# Patient Record
Sex: FEMALE | Race: BLACK OR AFRICAN AMERICAN | NOT HISPANIC OR LATINO | Employment: OTHER | URBAN - METROPOLITAN AREA
[De-identification: names, ages, dates, MRNs, and addresses within clinical notes are randomized per-mention and may not be internally consistent; named-entity substitution may affect disease eponyms.]

---

## 2018-01-25 ENCOUNTER — OFFICE VISIT (OUTPATIENT)
Dept: INTERNAL MEDICINE | Facility: CLINIC | Age: 63
End: 2018-01-25
Payer: MEDICARE

## 2018-01-25 VITALS
HEART RATE: 92 BPM | OXYGEN SATURATION: 95 % | HEIGHT: 63 IN | DIASTOLIC BLOOD PRESSURE: 78 MMHG | TEMPERATURE: 99 F | SYSTOLIC BLOOD PRESSURE: 128 MMHG | BODY MASS INDEX: 41.29 KG/M2 | WEIGHT: 233 LBS

## 2018-01-25 DIAGNOSIS — I10 HYPERTENSION, UNSPECIFIED TYPE: ICD-10-CM

## 2018-01-25 DIAGNOSIS — Z79.4 TYPE 2 DIABETES MELLITUS WITH COMPLICATION, WITH LONG-TERM CURRENT USE OF INSULIN: ICD-10-CM

## 2018-01-25 DIAGNOSIS — F32.A DEPRESSION, UNSPECIFIED DEPRESSION TYPE: ICD-10-CM

## 2018-01-25 DIAGNOSIS — Z09 HOSPITAL DISCHARGE FOLLOW-UP: ICD-10-CM

## 2018-01-25 DIAGNOSIS — F03.90 DEMENTIA WITHOUT BEHAVIORAL DISTURBANCE, UNSPECIFIED DEMENTIA TYPE: ICD-10-CM

## 2018-01-25 DIAGNOSIS — E66.01 MORBID OBESITY: Primary | ICD-10-CM

## 2018-01-25 DIAGNOSIS — E78.5 HYPERLIPIDEMIA, UNSPECIFIED HYPERLIPIDEMIA TYPE: ICD-10-CM

## 2018-01-25 DIAGNOSIS — E03.9 HYPOTHYROIDISM, UNSPECIFIED TYPE: ICD-10-CM

## 2018-01-25 DIAGNOSIS — E11.8 TYPE 2 DIABETES MELLITUS WITH COMPLICATION, WITH LONG-TERM CURRENT USE OF INSULIN: ICD-10-CM

## 2018-01-25 DIAGNOSIS — G89.4 CHRONIC PAIN DISORDER: ICD-10-CM

## 2018-01-25 DIAGNOSIS — R32 URINARY INCONTINENCE, UNSPECIFIED TYPE: ICD-10-CM

## 2018-01-25 DIAGNOSIS — R26.81 GAIT INSTABILITY: ICD-10-CM

## 2018-01-25 PROCEDURE — 99204 OFFICE O/P NEW MOD 45 MIN: CPT | Mod: S$GLB,,, | Performed by: FAMILY MEDICINE

## 2018-01-25 PROCEDURE — 99999 PR PBB SHADOW E&M-NEW PATIENT-LVL V: CPT | Mod: PBBFAC,,, | Performed by: FAMILY MEDICINE

## 2018-01-25 RX ORDER — ZOLPIDEM TARTRATE 5 MG/1
5 TABLET ORAL NIGHTLY
Refills: 1 | COMMUNITY
Start: 2017-12-13 | End: 2020-08-22

## 2018-01-25 RX ORDER — INSULIN GLARGINE 100 [IU]/ML
INJECTION, SOLUTION SUBCUTANEOUS
Refills: 1 | COMMUNITY
Start: 2017-12-06 | End: 2019-11-07 | Stop reason: DRUGHIGH

## 2018-01-25 RX ORDER — ESCITALOPRAM OXALATE 20 MG/1
20 TABLET ORAL DAILY
Refills: 1 | COMMUNITY
Start: 2018-01-08 | End: 2019-11-07

## 2018-01-25 RX ORDER — HYDROGEN PEROXIDE 3 %
20 SOLUTION, NON-ORAL MISCELLANEOUS
COMMUNITY
End: 2018-02-26 | Stop reason: SDUPTHER

## 2018-01-25 RX ORDER — HYDROCODONE BITARTRATE AND ACETAMINOPHEN 10; 325 MG/1; MG/1
1 TABLET ORAL 2 TIMES DAILY
Refills: 0 | COMMUNITY
Start: 2017-11-16 | End: 2018-02-26

## 2018-01-25 RX ORDER — ROSUVASTATIN CALCIUM 10 MG/1
10 TABLET, COATED ORAL NIGHTLY
Refills: 1 | COMMUNITY
Start: 2018-01-18 | End: 2020-05-14 | Stop reason: SDUPTHER

## 2018-01-25 RX ORDER — LEVOTHYROXINE SODIUM 88 UG/1
88 TABLET ORAL DAILY
Refills: 0 | COMMUNITY
Start: 2018-01-02 | End: 2019-12-30

## 2018-01-25 RX ORDER — POLYETHYLENE GLYCOL 3350 17 G/17G
POWDER, FOR SOLUTION ORAL
Refills: 0 | COMMUNITY
Start: 2018-01-04 | End: 2020-10-29

## 2018-01-25 RX ORDER — INSULIN ASPART 100 [IU]/ML
INJECTION, SUSPENSION SUBCUTANEOUS
Refills: 2 | COMMUNITY
Start: 2018-01-22 | End: 2019-12-30

## 2018-01-25 RX ORDER — SITAGLIPTIN AND METFORMIN HYDROCHLORIDE 1000; 100 MG/1; MG/1
1 TABLET, FILM COATED, EXTENDED RELEASE ORAL DAILY
Refills: 0 | COMMUNITY
Start: 2018-01-02 | End: 2019-11-14 | Stop reason: CLARIF

## 2018-01-25 RX ORDER — LOSARTAN POTASSIUM AND HYDROCHLOROTHIAZIDE 12.5; 1 MG/1; MG/1
1 TABLET ORAL DAILY
Refills: 0 | COMMUNITY
Start: 2018-01-11 | End: 2020-01-27

## 2018-01-25 RX ORDER — MEMANTINE HYDROCHLORIDE 10 MG/1
10 TABLET ORAL 2 TIMES DAILY
Refills: 0 | COMMUNITY
Start: 2018-01-06 | End: 2020-10-02

## 2018-01-25 RX ORDER — ZOLPIDEM TARTRATE 10 MG/1
TABLET ORAL
Refills: 2 | COMMUNITY
Start: 2018-01-12 | End: 2020-08-22

## 2018-01-25 RX ORDER — BROMPHENIRAMINE MALEATE, DEXTROMETHORPHAN HBR, PHENYLEPHRINE HCL, DIPHENHYDRAMINE HCL, PHENYLEPHRINE HCL 0.52G
0.52 KIT ORAL DAILY
COMMUNITY
End: 2022-04-26

## 2018-01-25 RX ORDER — SOLIFENACIN SUCCINATE 5 MG/1
5 TABLET, FILM COATED ORAL DAILY
Refills: 0 | COMMUNITY
Start: 2018-01-16 | End: 2018-02-26 | Stop reason: SDUPTHER

## 2018-01-25 RX ORDER — NAPROXEN SODIUM 220 MG/1
81 TABLET, FILM COATED ORAL DAILY
COMMUNITY
End: 2020-08-19 | Stop reason: SDUPTHER

## 2018-01-25 RX ORDER — GABAPENTIN 300 MG/1
300 CAPSULE ORAL 3 TIMES DAILY
Refills: 0 | COMMUNITY
Start: 2017-11-16 | End: 2018-02-26

## 2018-01-25 RX ORDER — DILTIAZEM HYDROCHLORIDE 180 MG/1
180 CAPSULE, COATED, EXTENDED RELEASE ORAL 2 TIMES DAILY
Refills: 0 | COMMUNITY
Start: 2017-12-07 | End: 2018-02-26 | Stop reason: SDUPTHER

## 2018-01-25 RX ORDER — ERGOCALCIFEROL 1.25 MG/1
50000 CAPSULE ORAL
Refills: 0 | COMMUNITY
Start: 2017-12-28 | End: 2020-08-22

## 2018-01-25 NOTE — PROGRESS NOTES
Subjective:       Patient ID: Krysta Manuel is a 62 y.o. female.    Chief Complaint: Hospital Follow Up (pt presents today for a hospital f/u and establish care)    HPI     63 yo F    PMH   Stroke 2010 ( Weak on L side ), HTN, Hypothyroid, DM II, GERD.  Chronic back and knee pain.     Lives with Daughter and   Another daughter is with her today.    ER visit yesterday  OLOL   For HTN  Took medicine that day - uncertain why it was so high.  States normal never that high - reports around 150/80 when checks at home.    DM II  On insulin.    Insomnia:  Takes 10 mg - Ambien.  For year  Has a hard time falling asleep.      170/150 yesterday.    Last eye doctor 9 months - nadiya  Last colonoscopy 3 years ago. She is to return in 10 year  Up to date w/ mammogram  Up to date w/ colonoscopy    Non smoker  No alcohol  No drugs        Review of Systems   Constitutional: Negative for chills and fever.   HENT: Negative for congestion, sinus pressure and voice change.    Eyes: Negative for visual disturbance.   Respiratory: Negative for shortness of breath.    Cardiovascular: Negative for chest pain.   Gastrointestinal: Negative for constipation and diarrhea.   Genitourinary: Negative for difficulty urinating.   Musculoskeletal: Negative for gait problem and myalgias.   Skin: Negative for rash.   Neurological: Negative for dizziness and light-headedness.   Psychiatric/Behavioral: Negative for dysphoric mood.       Objective:       Vitals:    01/25/18 1447   BP: 128/78   Pulse: 92   Temp: 98.6 °F (37 °C)       Physical Exam   Constitutional: She is oriented to person, place, and time. She appears well-developed and well-nourished. She does not have a sickly appearance. No distress.   Morbid obese. Walks very slowly and intentionally w/ use of cane.   HENT:   Head: Normocephalic and atraumatic.   Right Ear: External ear normal.   Left Ear: External ear normal.   Eyes: Conjunctivae, EOM and lids are normal.   Neck:  Trachea normal, normal range of motion and full passive range of motion without pain.   Cardiovascular: Normal rate, regular rhythm and normal heart sounds.    Pulmonary/Chest: Effort normal and breath sounds normal. No stridor. No respiratory distress.   Abdominal: Soft. Normal appearance and bowel sounds are normal. She exhibits no distension. There is no tenderness. There is no guarding. No hernia.   Musculoskeletal: Normal range of motion.   Lymphadenopathy:     She has no cervical adenopathy.   Neurological: She is alert and oriented to person, place, and time. She is not disoriented.   Skin: Skin is warm, dry and intact. No rash noted. She is not diaphoretic.   Psychiatric: She has a normal mood and affect. Her speech is normal and behavior is normal. Thought content normal.       Assessment:       1. Morbid obesity    2. Hypertension, unspecified type    3. Type 2 diabetes mellitus with complication, with long-term current use of insulin    4. Hyperlipidemia, unspecified hyperlipidemia type    5. Hospital discharge follow-up    6. Depression, unspecified depression type    7. Dementia without behavioral disturbance, unspecified dementia type    8. Urinary incontinence, unspecified type    9. Chronic pain disorder    10. Hypothyroidism, unspecified type    11. Gait instability        Plan:   Morbid obesity    Hypertension, unspecified type    Type 2 diabetes mellitus with complication, with long-term current use of insulin    Hyperlipidemia, unspecified hyperlipidemia type    Hospital discharge follow-up    Depression, unspecified depression type    Dementia without behavioral disturbance, unspecified dementia type    Urinary incontinence, unspecified type    Chronic pain disorder    Hypothyroidism, unspecified type    Gait instability      61 yo AAF w/ numerous co-morbidities presents to discussed reent ER visit and establish care.    Plan on obtaining a lipid panel and hgb A1c / urine microalbuming for  baseline lab work.  She recently had CBC, CMP at [facility] - no labs concerning there  Troponin negative    HTN  Blood pressure much improved  Continue current meds  Request patient check daily and log  Will review together next week    DM II  Continue current meds/ insulin  Will check status of DM with A1c  Urine microalbumin  Plan on foot exam next week  Has had eye exam w/in the past year    Insomnia  On Ambien for past 6 years  Discussed risk  We will discuss more   Encouraged to at least attempt to wean down.  If don't need, do not take.    Gait  Walks very slowly  Plan on obtain PT for gait training.      Morbid obesity  Needs to weight  Diet! Calorie counting and exercise.    Depression -   On Lexapro 20mg    Hypothyroidism  Plan on checking TSH      Given patient has numerous co-morbidities plan on f/u in 1 week for lab review and further evaluation/ converstation/ health maintence.          No Follow-up on file.

## 2018-01-29 ENCOUNTER — LAB VISIT (OUTPATIENT)
Dept: LAB | Facility: HOSPITAL | Age: 63
End: 2018-01-29
Attending: FAMILY MEDICINE
Payer: MEDICARE

## 2018-01-29 DIAGNOSIS — Z79.4 TYPE 2 DIABETES MELLITUS WITH COMPLICATION, WITH LONG-TERM CURRENT USE OF INSULIN: ICD-10-CM

## 2018-01-29 DIAGNOSIS — E11.8 TYPE 2 DIABETES MELLITUS WITH COMPLICATION, WITH LONG-TERM CURRENT USE OF INSULIN: ICD-10-CM

## 2018-01-29 DIAGNOSIS — E78.5 HYPERLIPIDEMIA, UNSPECIFIED HYPERLIPIDEMIA TYPE: ICD-10-CM

## 2018-01-29 LAB
CHOLEST SERPL-MCNC: 129 MG/DL
CHOLEST/HDLC SERPL: 3.2 {RATIO}
ESTIMATED AVG GLUCOSE: 255 MG/DL
HBA1C MFR BLD HPLC: 10.5 %
HDLC SERPL-MCNC: 40 MG/DL
HDLC SERPL: 31 %
LDLC SERPL CALC-MCNC: 57.8 MG/DL
NONHDLC SERPL-MCNC: 89 MG/DL
TRIGL SERPL-MCNC: 156 MG/DL

## 2018-01-29 PROCEDURE — 83036 HEMOGLOBIN GLYCOSYLATED A1C: CPT

## 2018-01-29 PROCEDURE — 80061 LIPID PANEL: CPT

## 2018-01-29 PROCEDURE — 36415 COLL VENOUS BLD VENIPUNCTURE: CPT

## 2018-01-30 ENCOUNTER — TELEPHONE (OUTPATIENT)
Dept: INTERNAL MEDICINE | Facility: CLINIC | Age: 63
End: 2018-01-30

## 2018-01-30 DIAGNOSIS — Z79.4 TYPE 2 DIABETES MELLITUS WITH OTHER SPECIFIED COMPLICATION, WITH LONG-TERM CURRENT USE OF INSULIN: Primary | ICD-10-CM

## 2018-01-30 DIAGNOSIS — E11.69 TYPE 2 DIABETES MELLITUS WITH OTHER SPECIFIED COMPLICATION, WITH LONG-TERM CURRENT USE OF INSULIN: Primary | ICD-10-CM

## 2018-01-30 NOTE — TELEPHONE ENCOUNTER
----- Message from Hilton Chavez MD sent at 1/30/2018  9:21 AM CST -----  I attempted to call. No answer.  Please call and arrange for Diabetes Management appointment given A1c significantly elevated.   Order has been placed.

## 2018-02-01 ENCOUNTER — TELEPHONE (OUTPATIENT)
Dept: INTERNAL MEDICINE | Facility: CLINIC | Age: 63
End: 2018-02-01

## 2018-02-01 ENCOUNTER — OFFICE VISIT (OUTPATIENT)
Dept: INTERNAL MEDICINE | Facility: CLINIC | Age: 63
End: 2018-02-01
Payer: MEDICARE

## 2018-02-01 VITALS
DIASTOLIC BLOOD PRESSURE: 82 MMHG | HEART RATE: 90 BPM | HEIGHT: 63 IN | TEMPERATURE: 98 F | WEIGHT: 233 LBS | RESPIRATION RATE: 17 BRPM | BODY MASS INDEX: 41.29 KG/M2 | OXYGEN SATURATION: 99 % | SYSTOLIC BLOOD PRESSURE: 140 MMHG

## 2018-02-01 DIAGNOSIS — E11.69 TYPE 2 DIABETES MELLITUS WITH OTHER SPECIFIED COMPLICATION, WITH LONG-TERM CURRENT USE OF INSULIN: Primary | ICD-10-CM

## 2018-02-01 DIAGNOSIS — Z12.39 SCREENING FOR BREAST CANCER: ICD-10-CM

## 2018-02-01 DIAGNOSIS — I10 HYPERTENSION, UNSPECIFIED TYPE: ICD-10-CM

## 2018-02-01 DIAGNOSIS — Z23 IMMUNIZATION DUE: ICD-10-CM

## 2018-02-01 DIAGNOSIS — Z79.4 TYPE 2 DIABETES MELLITUS WITH OTHER SPECIFIED COMPLICATION, WITH LONG-TERM CURRENT USE OF INSULIN: Primary | ICD-10-CM

## 2018-02-01 DIAGNOSIS — G89.4 CHRONIC PAIN DISORDER: ICD-10-CM

## 2018-02-01 PROCEDURE — 3008F BODY MASS INDEX DOCD: CPT | Mod: S$GLB,,, | Performed by: FAMILY MEDICINE

## 2018-02-01 PROCEDURE — 99214 OFFICE O/P EST MOD 30 MIN: CPT | Mod: S$GLB,,, | Performed by: FAMILY MEDICINE

## 2018-02-01 PROCEDURE — G0009 ADMIN PNEUMOCOCCAL VACCINE: HCPCS | Mod: S$GLB,,, | Performed by: FAMILY MEDICINE

## 2018-02-01 PROCEDURE — 90732 PPSV23 VACC 2 YRS+ SUBQ/IM: CPT | Mod: S$GLB,,, | Performed by: FAMILY MEDICINE

## 2018-02-01 PROCEDURE — 99999 PR PBB SHADOW E&M-EST. PATIENT-LVL V: CPT | Mod: PBBFAC,,, | Performed by: FAMILY MEDICINE

## 2018-02-01 PROCEDURE — G0008 ADMIN INFLUENZA VIRUS VAC: HCPCS | Mod: S$GLB,,, | Performed by: FAMILY MEDICINE

## 2018-02-01 PROCEDURE — 90688 IIV4 VACCINE SPLT 0.5 ML IM: CPT | Mod: S$GLB,,, | Performed by: FAMILY MEDICINE

## 2018-02-01 RX ORDER — MELOXICAM 7.5 MG/1
7.5 TABLET ORAL DAILY
Qty: 30 TABLET | Refills: 0 | Status: SHIPPED | OUTPATIENT
Start: 2018-02-01 | End: 2022-04-26

## 2018-02-01 RX ORDER — DICLOFENAC SODIUM 10 MG/G
2 GEL TOPICAL 4 TIMES DAILY
Qty: 1 TUBE | Refills: 3 | Status: SHIPPED | OUTPATIENT
Start: 2018-02-01 | End: 2022-01-31

## 2018-02-01 NOTE — TELEPHONE ENCOUNTER
----- Message from Tg Sheppard sent at 1/31/2018 11:57 AM CST -----  Please call pt daughter Kamille back at [phone] about her starting P T services on Monday coming and giving her pain meds.

## 2018-02-01 NOTE — PROGRESS NOTES
Subjective:       Patient ID: Krysta Manuel is a 62 y.o. female.    Chief Complaint: Morbid obesity (Patient is coming in to follow up on labs)    HPI     61 yo    F/U on labs    A1c 10.7  Januvia  Taking insulin - 3x/ day  Lantus  Novolog    Patient is unsure of what her sugars.    Eye exam - due - will arrange w/ [facility] - was with Abhay son.    Last Mammogram - 2 years ago.        Vaccinations:  Influenza needs - will do today.  Pneumococcal needs - will do today    Patient has been off her pain medication for past 2 months.  Was taking Norco  Pain source is arthritis in knees and lower back pain.  Pain mngt appt set up on 13th.  Has never taken Mobic.          Review of Systems   Constitutional: Negative for chills and fever.   HENT: Negative for voice change.    Eyes: Negative for visual disturbance.   Respiratory: Negative for cough and shortness of breath.    Cardiovascular: Negative for chest pain.   Gastrointestinal: Negative for constipation and diarrhea.   Genitourinary: Negative for difficulty urinating.   Musculoskeletal: Positive for arthralgias and gait problem. Negative for myalgias.   Skin: Negative for rash.   Neurological: Negative for dizziness and light-headedness.   Psychiatric/Behavioral: Negative for dysphoric mood.       Objective:       Vitals:    02/01/18 1424   BP: (!) 140/82   Pulse: 90   Resp: 17   Temp: 97.5 °F (36.4 °C)       Protective Sensation (w/ 10 gram monofilament):  Right: Intact  Left: Intact    Visual Inspection:  None -  Bilateral   Small corn on R heel - no ulceration appreciated. No tenderness.    Pedal Pulses:   Right: Present  Left: Present    Posterior tibialis:   Right:Diminshed  Left: Diminshed        Physical Exam   Constitutional: She is oriented to person, place, and time. She appears well-developed and well-nourished. She does not have a sickly appearance. No distress.   Morbid obese. In wheelchair.   HENT:   Head: Normocephalic and atraumatic.   Right Ear:  External ear normal.   Left Ear: External ear normal.   Eyes: Conjunctivae, EOM and lids are normal.   Neck: Trachea normal, normal range of motion and full passive range of motion without pain.   Cardiovascular: Normal rate, regular rhythm and normal heart sounds.    Pulmonary/Chest: Effort normal and breath sounds normal. No stridor. No respiratory distress.   Abdominal: Soft. Normal appearance and bowel sounds are normal. She exhibits no distension. There is no tenderness. There is no guarding. No hernia.   Musculoskeletal: Normal range of motion.   Lymphadenopathy:     She has no cervical adenopathy.   Neurological: She is alert and oriented to person, place, and time. She is not disoriented.   Skin: Skin is warm, dry and intact. No rash noted. She is not diaphoretic.   Psychiatric: She has a normal mood and affect. Her speech is normal and behavior is normal. Thought content normal.       Assessment:       1. Type 2 diabetes mellitus with other specified complication, with long-term current use of insulin    2. Hypertension, unspecified type    3. Chronic pain disorder    4. Immunization due    5. Screening for breast cancer        Plan:   Type 2 diabetes mellitus with other specified complication, with long-term current use of insulin    Given A1c very elevated plan on evaluation by DM MNGT  Foot exam done today  Opth exam to be obtained    Hypertension, unspecified type    Slightly elevated today  Took medication  Will recheck at her F/U visit or DM visit  If elevated again plan to adjust medicine regimen    Chronic pain disorder  Knee and back pain  Pain mngt appt upcoming  Rather than refill norco 10 - 325 - which she has been out of plan on trying Mobic - if that fails consider tramadol or await Pain MNGT recommendations. Patient and daughter agreeable.    Immunization due    Influenza and pneumococcal    Mammography  Order mammogram    Follow-up in about 3 months (around 5/1/2018).

## 2018-02-01 NOTE — TELEPHONE ENCOUNTER
Spoke with daughter. Patient has established care with you and is no longer under the care of Dr. Alas. She starts physical therapy on the 13th. She will also be seeing PM doctor on this day. Daughter requests that patient's Norco 10mg be refilled just enough to last her until she sees pain management on the 13th.

## 2018-02-01 NOTE — TELEPHONE ENCOUNTER
Spoke with daughter. She was wanting to know if her mother would be written enough pain medicine to last until she saw the PM doctor on the 13th. I informed her that the doctor does not write pain medication sight unseen but that he would be happy to discuss with her today when she comes in for her appointment.

## 2018-02-09 ENCOUNTER — PATIENT OUTREACH (OUTPATIENT)
Dept: ADMINISTRATIVE | Facility: HOSPITAL | Age: 63
End: 2018-02-09

## 2018-02-13 ENCOUNTER — CLINICAL SUPPORT (OUTPATIENT)
Dept: DIABETES | Facility: CLINIC | Age: 63
End: 2018-02-13
Payer: MEDICARE

## 2018-02-13 ENCOUNTER — OFFICE VISIT (OUTPATIENT)
Dept: PAIN MEDICINE | Facility: CLINIC | Age: 63
End: 2018-02-13
Payer: MEDICARE

## 2018-02-13 VITALS
WEIGHT: 256 LBS | HEIGHT: 63 IN | RESPIRATION RATE: 18 BRPM | HEART RATE: 84 BPM | DIASTOLIC BLOOD PRESSURE: 88 MMHG | BODY MASS INDEX: 45.36 KG/M2 | SYSTOLIC BLOOD PRESSURE: 146 MMHG

## 2018-02-13 VITALS — WEIGHT: 256.5 LBS | BODY MASS INDEX: 45.45 KG/M2 | HEIGHT: 63 IN

## 2018-02-13 DIAGNOSIS — G89.29 CHRONIC PAIN OF BOTH KNEES: Primary | ICD-10-CM

## 2018-02-13 DIAGNOSIS — E11.69 TYPE 2 DIABETES MELLITUS WITH OTHER SPECIFIED COMPLICATION, WITH LONG-TERM CURRENT USE OF INSULIN: Primary | ICD-10-CM

## 2018-02-13 DIAGNOSIS — Z79.4 TYPE 2 DIABETES MELLITUS WITH OTHER SPECIFIED COMPLICATION, WITH LONG-TERM CURRENT USE OF INSULIN: Primary | ICD-10-CM

## 2018-02-13 DIAGNOSIS — M47.816 LUMBAR SPONDYLOSIS: ICD-10-CM

## 2018-02-13 DIAGNOSIS — M79.18 MYOFASCIAL MUSCLE PAIN: ICD-10-CM

## 2018-02-13 DIAGNOSIS — M25.562 CHRONIC PAIN OF BOTH KNEES: Primary | ICD-10-CM

## 2018-02-13 DIAGNOSIS — M25.561 CHRONIC PAIN OF BOTH KNEES: Primary | ICD-10-CM

## 2018-02-13 PROCEDURE — 99999 PR PBB SHADOW E&M-EST. PATIENT-LVL II: CPT | Mod: PBBFAC,,, | Performed by: DIETITIAN, REGISTERED

## 2018-02-13 PROCEDURE — 3008F BODY MASS INDEX DOCD: CPT | Mod: S$GLB,,, | Performed by: ANESTHESIOLOGY

## 2018-02-13 PROCEDURE — 99204 OFFICE O/P NEW MOD 45 MIN: CPT | Mod: S$GLB,,, | Performed by: ANESTHESIOLOGY

## 2018-02-13 PROCEDURE — G0108 DIAB MANAGE TRN  PER INDIV: HCPCS | Mod: S$GLB,,, | Performed by: DIETITIAN, REGISTERED

## 2018-02-13 PROCEDURE — 99999 PR PBB SHADOW E&M-EST. PATIENT-LVL III: CPT | Mod: PBBFAC,,, | Performed by: ANESTHESIOLOGY

## 2018-02-13 RX ORDER — TIZANIDINE 4 MG/1
4 TABLET ORAL 2 TIMES DAILY PRN
Qty: 60 TABLET | Refills: 0 | Status: SHIPPED | OUTPATIENT
Start: 2018-02-13 | End: 2018-03-20 | Stop reason: SDUPTHER

## 2018-02-13 NOTE — LETTER
February 13, 2018      Hilton Chavez MD  [address] [facility] [address]           [facility]  [address] [place] [address]  Phone: [phone]  Fax: [phone]          Patient: Krysta Manuel   MR Number: [id number]   YOB: 1955   Date of Visit: 2/13/2018       Dear Dr. Hilton Chavez:    Thank you for referring Krysta Manuel to me for evaluation. Attached you will find relevant portions of my assessment and plan of care.    If you have questions, please do not hesitate to call me. I look forward to following Krysta Manuel along with you.    Sincerely,    Juanjo Pagan MD    Enclosure  CC:  No Recipients    If you would like to receive this communication electronically, please contact [email or web address] or [phone] to request more information on [facility] [employer] access.    For providers and/or their staff who would like to refer a patient to [facility], please contact us through our one-stop-shop provider referral line, [facility] , at [phone].    If you feel you have received this communication in error or would no longer like to receive these types of communications, please e-mail [email or web address]

## 2018-02-13 NOTE — PROGRESS NOTES
Diabetes Education  Author: Kalyan Fischer RD  Date: 2/13/2018    Diabetes Education Visit  Diabetes Education Record Assessment/Progress: Initial    Diabetes Type  Diabetes Type : Type II    Diabetes History  Diabetes Diagnosis: 5-10 years     Referring Provider: Hilton Chavez MD  62 y.o. female in clinic today for diabetes education.   In 2010, pt had a stroke - left arm paralyzed. Daughter and  live with her and daughter cares for them both.   DM MEDICATIONS:    Janumet, 1000 mg daily  Lantus, avg of 80 units in morning  Novolog 70/30, 40-50 units in morning    Dose of insulin depends on blood glucose. No sliding scale given. Daughter just doses based on what she thinks she needs. She sometimes takes Lantus and sometimes takes Novolog 70/30. Some days she gets both in one day, but not at the same time.     Recent A1C:  Hemoglobin A1C   Date Value Ref Range Status   01/29/2018 10.5 (H) 4.0 - 5.6 % Final     Comment:     According to ADA guidelines, hemoglobin A1c <7.0% represents  optimal control in non-pregnant diabetic patients. Different  metrics may apply to specific patient populations.   Standards of Medical Care in Diabetes-2016.  For the purpose of screening for the presence of diabetes:  <5.7%     Consistent with the absence of diabetes  5.7-6.4%  Consistent with increasing risk for diabetes   (prediabetes)  >or=6.5%  Consistent with diabetes  Currently, no consensus exists for use of hemoglobin A1c  for diagnosis of diabetes for children.  This Hemoglobin A1c assay has significant interference with fetal   hemoglobin   (HbF). The results are invalid for patients with abnormal amounts of   HbF,   including those with known Hereditary Persistence   of Fetal Hemoglobin. Heterozygous hemoglobin variants (HbAS, HbAC,   HbAD, HbAE, HbA2) do not significantly interfere with this assay;   however, presence of multiple variants in a sample may impact the %   interference.         Nutrition  Meal  Planning: water, skipping meals  What type of sweetener do you use?: sugar  What type of beverages do you drink?: other (see comments), water, diet soda/tea (SF [employer], lemonade)  Meal Plan 24 Hour Recall - Breakfast: grits w/cheese, 1 egg, ham, SF lemonade   Meal Plan 24 Hour Recall - Lunch: none  Meal Plan 24 Hour Recall - Dinner: spaghetti w/meatsauce, corn, SF [employer]  Meal Plan 24 Hour Recall - Snack: none OR oranges OR SF cookies    Monitoring   Self Monitoring : per recall from daughter:  fst - 180-190, ac - 250's; BG has gone as high as 500  Blood Glucose Logs: No    Exercise   Exercise Type: none  Intensity: Low  Frequency:  (PT twice weekly)  Duration: 1 hour    Current Diabetes Treatment   Current Treatment: Oral Medication, Diet, Insulin    Social History  Preferred Learning Method: Face to Face  Primary Support: Self, Daughter  Occupation: disabled  Smoking Status: Never a Smoker  Alcohol Use: Never    Readiness to Learn   Readiness to Learn : Acceptance    Cultural Influences  Cultural Influences: None    Diabetes Education Assessment/Progress  Diabetes Disease Process (diabetes disease process and treatment options): Discussion, Individual Session, Demonstrates Understanding/Competency(verbalizes/demonstrates), Written Materials Provided  Nutrition (Incorporating nutritional management into one's lifestyle): Discussion, Individual Session, Demonstrates Understanding/Competency (verbalizes/demonstrates), Written Materials Provided  Physical Activity (incorporating physical activity into one's lifestyle): Discussion, Individual Session, Demonstrates Understanding/Competency (verbalizes/demonstrates)  Medications (states correct name, dose, onset, peak, duration, side effects & timing of meds): Discussion, Individual Session, Demonstrates Understanding/Competency(verbalizes/demonstrates), Written Materials Provided  Monitoring (monitoring blood glucose/other parameters & using results): Discussion,  Individual Session, Demonstrates Understanding/Competency (verbalizes/demonstrates), Written Materials Provided  Acute Complications (preventing, detecting, and treating acute complications): Discussion, Individual Session, Demonstrates Understanding/Competency (verbalizes/demonstrates), Written Materials Provided  Chronic Complications (preventing, detecting, and treating chronic complications): Discussion, Individual Session, Demonstrates Understanding/Competency (verbalizes/demonstrates)  Clinical (diabetes and other pertinent medical history): Discussion, Individual Session, Demonstrates Understanding/Competency (verbalizes/demonstrates)  Cognitive (knowledge of self-management skills, functional health literacy): Discussion, Individual Session, Demonstrates Understanding/Competency (verbalizes/demonstrates)  Psychosocial (emotional response to diabetes): Discussion, Individual Session, Demonstrates Understanding/Competency (verbalizes/demonstrates)  Diabetes Distress and Support Systems: Discussion, Individual Session, Demonstrates Understanding/Competency (verbalizes/demonstrates)  Behavioral (readiness for change, lifestyle practices, self-care behaviors): Discussion, Individual Session, Demonstrates Understanding/Competency (verbalizes/demonstrates)    Goals  Patient has selected/evaluated goals during today's session: Yes, selected  Healthy Eating: Set (30-45 g carb/meal)  Start Date: 02/13/18  Target Date: 05/13/18  Monitoring: Set (ck BG fasting and before meals or 2 hours after a meal)  Start Date: 02/13/18  Target Date: 05/13/18    Diabetes Care Plan/Intervention  Education Plan/Intervention: Individual Follow-Up DSMT, Endocrine Provider Visit Set Up    Give Lantus only, not Novolog 70/30.    Need better records of fasting and pp BG done in manner reflective of recommendations. Send message through [facility] in 2-3 days with BG log.     Diabetes Meal Plan  Restrictions: Restricted  Carbohydrate  Calories: 1800, 1600  Carbohydrate Per Meal: 30-45g  Carbohydrate Per Snack : 15-20g    Education Units of Time   Time Spent: 60 min      Health Maintenance Topics with due status: Not Due       Topic Last Completion Date    Colonoscopy 08/12/2015    Lipid Panel 01/29/2018    Hemoglobin A1c 01/29/2018    Pneumococcal PPSV23 (Medium Risk) 02/01/2018    Foot Exam 02/01/2018    Low Dose Statin 02/13/2018     Health Maintenance Due   Topic Date Due    Hepatitis C Screening  1955    Eye Exam  05/22/1964    TETANUS VACCINE  05/22/1972    Mammogram  05/22/1994    Zoster Vaccine  05/22/2014

## 2018-02-13 NOTE — LETTER
February 13, 2018        Hilton Chavez MD  [address] [facility] [address]             [facility]  [id number] [place] [address]  Phone: [phone]  Fax: [phone]   Patient: Krysta Manuel   MR Number: [id number]   YOB: 1955   Date of Visit: 2/13/2018       Dear Dr. Chavez:    Thank you for referring Krysta Manuel to me for evaluation. Below are the relevant portions of my assessment and plan of care.       If you have questions, please do not hesitate to call me. I look forward to following Krysta along with you.    Sincerely,      Kalyan Fischer RD           CC  No Recipients

## 2018-02-13 NOTE — PROGRESS NOTES
Chief Pain Complaint:  Low-back Pain  Bilateral Knee Pain      History of Present Illness:   Krysta Manuel is a 62 y.o. female  who is presenting with a chief complaint of Low-back Pain  . The patient began experiencing this problem insidiously, and the pain has been gradually worsening over the past 5 year(s). The pain is described as throbbing, cramping, aching and heavy and is located in the bilateral lumbar spine. Pain is intermittent and lasts hours. The  pain is nonradiating. The patient rates her pain a 7 out of ten and interferes with activities of daily living a 7 out of ten. Pain is exacerbated by flexion of the lumbar spine, and is improved by rest. Patient reports no prior trauma, no prior spinal surgery .    Bilateral Knee Pain. The patient began experiencing this problem insidiously, and the pain has been gradually worsening over the past 4 year(s). The pain is described as throbbing, cramping and aching and is located in the bilateral knees. Pain is intermittent and lasts hours. The  pain is nonradiating. The patient rates her pain a 8 out of ten and interferes with activities of daily living a 8 out of ten. Pain is exacerbated by ambulation, and is improved by rest, Norco.     - pertinent negatives: No fever, No chills, No weight loss, No bladder dysfunction, No bowel dysfunction, No saddle anesthesia  - pertinent positives: left leg weakness  Left Arm weakness since stroke in 2010  - medications, other therapies tried (physical therapy, injections):     >> NSAIDs, Tylenol, Norco, gabapentin and zanaflex    >> Has previously undergone Physical Therapy    >> Has NOT previously undergone spinal injection/s      Imaging / Labs / Studies (reviewed on 2/13/2018):      Review of Systems:  CONSTITUTIONAL: patient denies any fever, chills, or weight loss  SKIN: patient denies any rash or itching  RESPIRATORY: patient denies having any shortness of breath  GASTROINTESTINAL: patient denies having any  "diarrhea, constipation, or bowel incontinence  GENITOURINARY: patient denies having any abnormal bladder function    MUSCULOSKELETAL:  - patient complains of the above noted pain/s (see chief pain complaint)    NEUROLOGICAL:   - pain as above  - strength in Lower extremities is decreased, on the LEFT  - sensation in Lower extremities is intact, BILATERALLY  - patient denies any loss of bowel or bladder control      PSYCHIATRIC: patient denies any change in mood    Other:  All other systems reviewed and are negative      Physical Exam:  BP (!) 146/88 (BP Location: Right arm, Patient Position: Sitting, BP Method: Medium (Automatic))   Pulse 84   Resp 18   Ht 5' 3" (1.6 m)   Wt 116.1 kg (256 lb)   BMI 45.35 kg/m²  (reviewed on 2/13/2018)  General: Alert and oriented, in no apparent distress.  Gait: normal gait.  Skin: No rashes, No discoloration, No obvious lesions  HEENT: Normocephalic, atraumatic. Pupils equal and round.  Cardiovascular: Regular rate and rhythm , no significant peripheral edema present  Respiratory: Without audible wheezing, without use of accessory muscles of respiration.    Musculoskeletal:    Lumbar Spine    - Pain on flexion of lumbar spine Absent  - Straight Leg Raise:  Absent    - Pain on extension of lumbar spine Absent  - TTP over the lumbar facet joints Absent  - Lumbar facet loading Absent    -Pain on palpation over the SI joint  Absent  - VENITA: Absent    -TTP over the para lumbar muscles    -TTP over bilateral knee joints. Joints cool. No effusion.     Assessment:    Krysta Manuel is a 62 y.o. year old female who is presenting with     Encounter Diagnoses   Name Primary?    Lumbar spondylosis     Chronic pain of both knees Yes    Myofascial muscle pain        Plan:    1. Interventional: Consider Bilateral Genicular Nerve Block. Patient is not interested at this time.     2. Pharmacologic: Start Tizanidine 4 mg PO BID PRN. Compound Cream prescribed.  Checked. Opioid not " indicated at this time.     3. Rehabilitative: Encouraged Ambulation. Patient currently in Pt.     4. Diagnostic: Lumbar Xray, Bilateral Knee Xray.     5. Follow up: Follow-up in about 4 weeks (around 3/13/2018).      30 minutes were spent in this encounter with more than 50% of the time used for counseling and review of the plan.  Imaging / studies reviewed, detailed above.  I discussed in detail the risks, benefits, and alternatives to any and all potential treatment options.  All questions and concerns were fully addressed today in clinic. Medical decision making moderate.    Thank you for the opportunity to assist in the care of this patient.    Best wishes,    Signed:    Juanjo Pagan MD          Disclaimer:  This note may have been prepared using voice recognition software, it may have not been extensively proofed, as such there could be errors within the text such as sound alike errors.

## 2018-02-14 ENCOUNTER — TELEPHONE (OUTPATIENT)
Dept: DIABETES | Facility: CLINIC | Age: 63
End: 2018-02-14

## 2018-02-14 NOTE — TELEPHONE ENCOUNTER
Spoke to daughter today to check on her mother's BG. Last night, mother's BG was 180 and she did not want to take insulin. This morning, her BG was in the 200's.   Reviewed order for Lantus with daughter - informed that it was ordered to give bid. She expressed that she is aware of this, but she will just keep doing what she's been doing for dosing insulin. Encouraged to keep appt with VIRGINIA Garcia next week.

## 2018-02-20 ENCOUNTER — TELEPHONE (OUTPATIENT)
Dept: DIABETES | Facility: CLINIC | Age: 63
End: 2018-02-20

## 2018-02-23 ENCOUNTER — TELEPHONE (OUTPATIENT)
Dept: DIABETES | Facility: CLINIC | Age: 63
End: 2018-02-23

## 2018-02-26 ENCOUNTER — OFFICE VISIT (OUTPATIENT)
Dept: INTERNAL MEDICINE | Facility: CLINIC | Age: 63
End: 2018-02-26
Payer: MEDICARE

## 2018-02-26 VITALS
BODY MASS INDEX: 40.79 KG/M2 | RESPIRATION RATE: 18 BRPM | SYSTOLIC BLOOD PRESSURE: 152 MMHG | WEIGHT: 230.19 LBS | HEIGHT: 63 IN | DIASTOLIC BLOOD PRESSURE: 84 MMHG | HEART RATE: 86 BPM

## 2018-02-26 DIAGNOSIS — Z86.73 HISTORY OF STROKE: ICD-10-CM

## 2018-02-26 DIAGNOSIS — F41.9 ANXIETY: Primary | ICD-10-CM

## 2018-02-26 DIAGNOSIS — R10.9 ABDOMINAL PAIN, UNSPECIFIED ABDOMINAL LOCATION: ICD-10-CM

## 2018-02-26 PROCEDURE — 99999 PR PBB SHADOW E&M-EST. PATIENT-LVL III: CPT | Mod: PBBFAC,,, | Performed by: FAMILY MEDICINE

## 2018-02-26 PROCEDURE — 99213 OFFICE O/P EST LOW 20 MIN: CPT | Mod: S$GLB,,, | Performed by: FAMILY MEDICINE

## 2018-02-26 PROCEDURE — 3008F BODY MASS INDEX DOCD: CPT | Mod: S$GLB,,, | Performed by: FAMILY MEDICINE

## 2018-02-26 RX ORDER — HYDROGEN PEROXIDE 3 %
40 SOLUTION, NON-ORAL MISCELLANEOUS DAILY
Qty: 60 CAPSULE | Refills: 1 | Status: SHIPPED | OUTPATIENT
Start: 2018-02-26 | End: 2019-11-07

## 2018-02-26 RX ORDER — HYDROXYZINE HYDROCHLORIDE 50 MG/1
50 TABLET, FILM COATED ORAL 3 TIMES DAILY PRN
Qty: 90 TABLET | Refills: 1 | Status: SHIPPED | OUTPATIENT
Start: 2018-02-26 | End: 2018-04-30 | Stop reason: SDUPTHER

## 2018-02-26 RX ORDER — SOLIFENACIN SUCCINATE 5 MG/1
5 TABLET, FILM COATED ORAL DAILY
Qty: 30 TABLET | Refills: 3 | Status: SHIPPED | OUTPATIENT
Start: 2018-02-26 | End: 2022-04-26

## 2018-02-26 RX ORDER — DILTIAZEM HYDROCHLORIDE 180 MG/1
180 CAPSULE, COATED, EXTENDED RELEASE ORAL DAILY
Qty: 30 CAPSULE | Refills: 3 | Status: SHIPPED | OUTPATIENT
Start: 2018-02-26 | End: 2020-04-30

## 2018-02-26 NOTE — PROGRESS NOTES
"Subjective:       Patient ID: Krysta Manuel is a 62 y.o. female.    Chief Complaint: Heartburn    HPI     61 yo F    Presents with Abdominal Burning  Notes it with "nervousness" - only occurs when she is nervous. Not so much exertion - just emotional stress/anxiety.    Normal BM  No dark stools. No red stools    No chest pain  No SOB  Slight nausea.  No fevers  No chills      Takes Nexium.    Abdomen not tender to touch.  Present all day.  Improves at night time.  States resolves when she goes to relax.    Feels it is present when she is stressed.  She feels it come on when she is nervous.    States it takes her a long time to fall asleep. Blames it on being nervous.   Continues to say it is her nerves and how anxious she is.    Denies exertional symptoms.  Review of Systems   Constitutional: Negative for chills and fever.   HENT: Negative for congestion, sinus pressure and voice change.    Eyes: Negative for visual disturbance.   Respiratory: Negative for shortness of breath.    Cardiovascular: Negative for chest pain.   Gastrointestinal: Negative for constipation and diarrhea.   Genitourinary: Negative for difficulty urinating.   Musculoskeletal: Negative for gait problem and myalgias.   Skin: Negative for rash.   Neurological: Negative for dizziness and light-headedness.   Psychiatric/Behavioral: Negative for dysphoric mood.       Objective:       Vitals:    02/26/18 1615   BP: (!) 152/84   Pulse: 86   Resp: 18       Physical Exam   Constitutional: She is oriented to person, place, and time. She appears well-developed and well-nourished. She does not have a sickly appearance. No distress.   HENT:   Head: Normocephalic and atraumatic.   Right Ear: External ear normal.   Left Ear: External ear normal.   Eyes: Conjunctivae, EOM and lids are normal.   Neck: Trachea normal, normal range of motion and full passive range of motion without pain.   Cardiovascular: Normal rate, regular rhythm and normal heart sounds.  "   Pulmonary/Chest: Effort normal and breath sounds normal. No stridor. No respiratory distress.   Abdominal: Soft. Normal appearance and bowel sounds are normal. She exhibits no distension. There is no tenderness. There is no guarding. No hernia.   Musculoskeletal: Normal range of motion.   Lymphadenopathy:     She has no cervical adenopathy.   Neurological: She is alert and oriented to person, place, and time. She is not disoriented.   Skin: Skin is warm, dry and intact. No rash noted. She is not diaphoretic.   Psychiatric: She has a normal mood and affect. Her speech is normal and behavior is normal. Thought content normal.       Assessment:       1. Anxiety    2. Abdominal pain, unspecified abdominal location        Plan:   Anxiety  Abdominal pain, unspecified abdominal location    Patient strongly feels this abdominal sensation is directly related to her mood / anxiety level. She denies any change with food, not tenderness to touch, normal BMs, no nausea.    Trial of atarax for anxiety relief. Increase PPI dose.  Suggested 40mg.    F/u in 1 week if fails to have improvement.         Other orders  -     hydrOXYzine (ATARAX) 50 MG tablet; Take 1 tablet (50 mg total) by mouth 3 (three) times daily as needed for Itching.  Dispense: 90 tablet; Refill: 1  -     esomeprazole (NEXIUM) 20 MG capsule; Take 2 capsules (40 mg total) by mouth once daily.  Dispense: 60 capsule; Refill: 1  -     diltiaZEM (CARDIZEM CD) 180 MG 24 hr capsule; Take 1 capsule (180 mg total) by mouth once daily.  Dispense: 30 capsule; Refill: 3  -     VESICARE 5 mg tablet; Take 1 tablet (5 mg total) by mouth once daily.  Dispense: 30 tablet; Refill: 3    History of Stroke  Patient utilizes and needs bedside commode due to weakness.    No Follow-up on file.

## 2018-03-09 ENCOUNTER — PATIENT OUTREACH (OUTPATIENT)
Dept: ADMINISTRATIVE | Facility: HOSPITAL | Age: 63
End: 2018-03-09

## 2018-03-09 NOTE — PROGRESS NOTES
MD Emma Dewitt MA             Patient no showed appt with Optho   Please call her and reschedule        Patient was not available, spoke with daughter, listed as emergency contact; who will call back to schedule opthalmology appointment. Patient's daughter in agreement and vocalize understanding.

## 2018-03-21 RX ORDER — TIZANIDINE 4 MG/1
TABLET ORAL
Qty: 60 TABLET | Refills: 0 | Status: SHIPPED | OUTPATIENT
Start: 2018-03-21 | End: 2022-04-26

## 2018-04-02 NOTE — TELEPHONE ENCOUNTER
----- Message from Estefany Wells sent at 4/2/2018  9:06 AM CDT -----  Contact: DaughterRupali Simental- [phone] or [phone]   Pt would like refills called in on Rx medication Vitamin D, Gabapentin and Nexium for twice daily. Please call back at [phone].  Thx -         Pt Uses:  [facility] - [address] [address]  Phone: [phone] Fax: [phone]

## 2018-04-02 NOTE — TELEPHONE ENCOUNTER
----- Message from Melissagabriele Nelson sent at 4/2/2018  3:42 PM CDT -----  Contact: pt daughter   1. What is the name of the medication you are requesting? Ambien   2. What is the dose? 10 mg   3. How do you take the medication? Orally, topically, etc? Orally   4. How often do you take this medication? One at bedtime   5. Do you need a 30 day or 90 day supply? 30  6. How many refills are you requesting? 1  7. What is your preferred pharmacy and location of the pharmacy?   [facility] - [address] [address]  Phone: [phone] Fax: [phone]  8. Who can we contact with further questions?the patient

## 2018-04-03 ENCOUNTER — HOSPITAL ENCOUNTER (OUTPATIENT)
Dept: RADIOLOGY | Facility: HOSPITAL | Age: 63
Discharge: HOME | End: 2018-04-03
Attending: FAMILY MEDICINE
Payer: MEDICARE

## 2018-04-03 VITALS — BODY MASS INDEX: 40.75 KG/M2 | HEIGHT: 63 IN | WEIGHT: 230 LBS

## 2018-04-03 DIAGNOSIS — Z12.39 SCREENING FOR BREAST CANCER: ICD-10-CM

## 2018-04-03 PROCEDURE — 77067 SCR MAMMO BI INCL CAD: CPT | Mod: TC

## 2018-04-03 PROCEDURE — 77067 SCR MAMMO BI INCL CAD: CPT | Mod: 26,,, | Performed by: RADIOLOGY

## 2018-04-03 RX ORDER — HYDROGEN PEROXIDE 3 %
40 SOLUTION, NON-ORAL MISCELLANEOUS DAILY
Qty: 60 CAPSULE | Refills: 1 | OUTPATIENT
Start: 2018-04-03

## 2018-04-03 RX ORDER — ERGOCALCIFEROL 1.25 MG/1
50000 CAPSULE ORAL
Refills: 0 | OUTPATIENT
Start: 2018-04-03

## 2018-04-03 RX ORDER — ZOLPIDEM TARTRATE 5 MG/1
5 TABLET ORAL NIGHTLY
Refills: 1 | OUTPATIENT
Start: 2018-04-03

## 2018-04-03 NOTE — TELEPHONE ENCOUNTER
Called and spoke with patient about setting up an appt to come and discuss medications with Dr. Chavez. She says she will have to speak with her daughter first because she will be bringing her. They will call to schedule appt.

## 2018-04-03 NOTE — TELEPHONE ENCOUNTER
----- Message from Chelita Puri sent at 4/3/2018  9:04 AM CDT -----  Contact: thierno-daughter  needs callback rg status of ambien, vit d, nexium (written out for 1/day instead 2/day as directed at appt). phar has been faxing for 2 wks...[phone] (urgent per pt)      [facility] - [address] [address]  Phone: [phone] Fax: [phone]

## 2018-04-03 NOTE — TELEPHONE ENCOUNTER
----- Message from Zehra Morgan sent at 4/3/2018  3:05 PM CDT -----  Contact: Kamille/daughter  Kamille called and stated she has been calling for a few weeks for refills. The nurse did call her back once and told her the prescriptions were at the pharmacy and when she went there they didn't have anything. She is very frustrated and wants to speak with someone who can resolve this.    She can be contacted at [phone].    Thanks,  Zehra

## 2018-04-03 NOTE — TELEPHONE ENCOUNTER
Spoke with Yana to confirm Nexium rx. Rx was written as 20mg two tabs daily. Yana states insurances would not cover two 20mg tabs so he filled 40mg 1 tab daily. Explained to Linnette and informed that is why she received 30 tabs vs 60 tabs. Linnette verbalized understanding. She is also requesting a refill on ambien.

## 2018-04-27 ENCOUNTER — PATIENT OUTREACH (OUTPATIENT)
Dept: ADMINISTRATIVE | Facility: HOSPITAL | Age: 63
End: 2018-04-27

## 2018-04-30 RX ORDER — HYDROXYZINE HYDROCHLORIDE 50 MG/1
50 TABLET, FILM COATED ORAL 3 TIMES DAILY PRN
Qty: 90 TABLET | Refills: 1 | Status: SHIPPED | OUTPATIENT
Start: 2018-04-30 | End: 2018-04-30 | Stop reason: SDUPTHER

## 2018-04-30 RX ORDER — HYDROXYZINE HYDROCHLORIDE 50 MG/1
50 TABLET, FILM COATED ORAL 3 TIMES DAILY PRN
Qty: 90 TABLET | Refills: 1 | Status: SHIPPED | OUTPATIENT
Start: 2018-04-30 | End: 2019-12-30

## 2018-04-30 NOTE — TELEPHONE ENCOUNTER
----- Message from Tg Wood sent at 4/30/2018 12:13 PM CDT -----  Contact: Daughter  Pt will run out of this medication before appt on 5/7.    1. What is the name of the medication you are requesting? Atarax  2. What is the dose? 50mg  3. How do you take the medication? Orally, topically, etc? orally  4. How often do you take this medication? 3 times a day  5. Do you need a 30 day or 90 day supply? 30  6. How many refills are you requesting? 1  7. What is your preferred pharmacy and location of the pharmacy? [facility]  8. Who can we contact with further questions? [phone]

## 2018-05-02 ENCOUNTER — TELEPHONE (OUTPATIENT)
Dept: INTERNAL MEDICINE | Facility: CLINIC | Age: 63
End: 2018-05-02

## 2018-05-02 DIAGNOSIS — I63.9 CEREBROVASCULAR ACCIDENT (CVA), UNSPECIFIED MECHANISM: ICD-10-CM

## 2018-05-02 NOTE — TELEPHONE ENCOUNTER
Patient states that she always has used them since her stroke. She said the one she had just broke and her insurance asked if we could fax over a new rx for one.

## 2018-05-02 NOTE — TELEPHONE ENCOUNTER
Please ask patient why she needs bedside commode?  Does she have mobility issues that we can document for insurance coverage?    Dr. Baldwin

## 2018-05-03 ENCOUNTER — TELEPHONE (OUTPATIENT)
Dept: INTERNAL MEDICINE | Facility: CLINIC | Age: 63
End: 2018-05-03

## 2018-05-03 NOTE — TELEPHONE ENCOUNTER
----- Message from Martita Nelson MD sent at 5/3/2018  9:51 AM CDT -----  Contact: Mely from [employer]   Please addend your last office notes and send.  She messaged me yesterday for this and I ordered.  She said she had history of Stroke and has used a besside commode and needed replacement.  I didn't see that in the PMhx.   Insurance is asking for this.    ----- Message -----  From: Mehdi Keane  Sent: 5/3/2018   9:29 AM  To: Omar Anders Staff    States she needs to have the clinical notes on why a bedside commode is being ordered for the pt and can be reached at [phone]//thanks/dbw     Fax # [phone]//

## 2018-05-04 PROBLEM — Z86.73 HISTORY OF STROKE: Status: ACTIVE | Noted: 2018-05-04

## 2018-08-17 DIAGNOSIS — E11.9 TYPE 2 DIABETES MELLITUS WITHOUT COMPLICATION: ICD-10-CM

## 2019-01-06 LAB — A1C: 14.4

## 2019-01-07 LAB
CHOLEST SERPL-MSCNC: 101 MG/DL (ref 0–200)
HDLC SERPL-MCNC: 37 MG/DL
LDLC SERPL CALC-MCNC: 27 MG/DL
NON HDL CHOL. (LDL+VLDL): 64
TRIGL SERPL-MCNC: 186 MG/DL

## 2019-01-16 LAB
MICROALB/CREAT RATIO: 26
MICROALBUM.,U,RANDOM: 24.9
TSH SERPL DL<=0.005 MIU/L-ACNC: 31.03 UIU/ML (ref 0.41–5.9)

## 2019-02-01 DIAGNOSIS — E11.9 TYPE 2 DIABETES MELLITUS WITHOUT COMPLICATION: ICD-10-CM

## 2019-02-14 ENCOUNTER — PATIENT OUTREACH (OUTPATIENT)
Dept: ADMINISTRATIVE | Facility: HOSPITAL | Age: 64
End: 2019-02-14

## 2019-02-14 NOTE — PROGRESS NOTES
I have spoke with patient regarding HTN follow up. Appointment scheduled 2-18-19 with Dr. Hilton Chavez

## 2019-05-07 ENCOUNTER — PATIENT OUTREACH (OUTPATIENT)
Dept: ADMINISTRATIVE | Facility: HOSPITAL | Age: 64
End: 2019-05-07

## 2019-05-13 ENCOUNTER — PATIENT OUTREACH (OUTPATIENT)
Dept: ADMINISTRATIVE | Facility: HOSPITAL | Age: 64
End: 2019-05-13

## 2019-05-13 NOTE — PROGRESS NOTES
I have attempted to contact patient to schedule dm eye exam. Appointment scheduled 6-14-19 with Dr. Connie Clinton

## 2019-07-01 ENCOUNTER — PATIENT OUTREACH (OUTPATIENT)
Dept: ADMINISTRATIVE | Facility: HOSPITAL | Age: 64
End: 2019-07-01

## 2019-07-01 NOTE — PROGRESS NOTES
Offered to schedule annual exam, follow up diabetes. She declined saying she has changed pcp. Chart updated.

## 2020-02-24 PROBLEM — E11.51 TYPE II DIABETES MELLITUS WITH PERIPHERAL CIRCULATORY DISORDER: Status: ACTIVE | Noted: 2020-02-24

## 2020-09-23 NOTE — TELEPHONE ENCOUNTER
Advised pt our pharmacist Derik Mena checked around and [employer]'s Relion glucometer is $9 and she can get 50 strips for $9.  She can use her existing lancing device and lancets. She states that is wonderful. She will try this. Call placed to confirm upcoming diabetes appointment.Patient has to reschedule due to transportation. Patient rescheduled to next Tuesday at the [facility] location. Address given to patient's daughter.

## 2021-03-29 PROBLEM — E66.01 CLASS 2 SEVERE OBESITY DUE TO EXCESS CALORIES WITH SERIOUS COMORBIDITY AND BODY MASS INDEX (BMI) OF 38.0 TO 38.9 IN ADULT: Status: ACTIVE | Noted: 2021-03-29

## 2021-03-29 PROBLEM — I69.354 HEMIPARESIS AFFECTING LEFT SIDE AS LATE EFFECT OF CEREBROVASCULAR ACCIDENT: Status: ACTIVE | Noted: 2021-03-29

## 2021-03-29 PROBLEM — E66.812 CLASS 2 SEVERE OBESITY DUE TO EXCESS CALORIES WITH SERIOUS COMORBIDITY AND BODY MASS INDEX (BMI) OF 38.0 TO 38.9 IN ADULT: Status: ACTIVE | Noted: 2021-03-29

## 2021-03-29 PROBLEM — F32.5 MAJOR DEPRESSION IN REMISSION: Status: ACTIVE | Noted: 2021-03-29

## 2021-03-30 PROBLEM — F03.90 DEMENTIA WITHOUT BEHAVIORAL DISTURBANCE: Status: ACTIVE | Noted: 2021-03-30

## 2021-10-11 ENCOUNTER — OFFICE VISIT (OUTPATIENT)
Dept: PODIATRY | Facility: CLINIC | Age: 66
End: 2021-10-11
Payer: MEDICARE

## 2021-10-11 DIAGNOSIS — E11.49 OTHER DIABETIC NEUROLOGICAL COMPLICATION ASSOCIATED WITH TYPE 2 DIABETES MELLITUS: Primary | ICD-10-CM

## 2021-10-11 DIAGNOSIS — L60.3 ONYCHODYSTROPHY: ICD-10-CM

## 2021-10-11 DIAGNOSIS — E66.01 SEVERE OBESITY: ICD-10-CM

## 2021-10-11 PROCEDURE — 1159F PR MEDICATION LIST DOCUMENTED IN MEDICAL RECORD: ICD-10-PCS | Mod: CPTII,S$GLB,, | Performed by: PODIATRIST

## 2021-10-11 PROCEDURE — 99999 PR PBB SHADOW E&M-EST. PATIENT-LVL IV: ICD-10-PCS | Mod: PBBFAC,,, | Performed by: PODIATRIST

## 2021-10-11 PROCEDURE — 1160F RVW MEDS BY RX/DR IN RCRD: CPT | Mod: CPTII,S$GLB,, | Performed by: PODIATRIST

## 2021-10-11 PROCEDURE — 1101F PR PT FALLS ASSESS DOC 0-1 FALLS W/OUT INJ PAST YR: ICD-10-PCS | Mod: CPTII,S$GLB,, | Performed by: PODIATRIST

## 2021-10-11 PROCEDURE — 1160F PR REVIEW ALL MEDS BY PRESCRIBER/CLIN PHARMACIST DOCUMENTED: ICD-10-PCS | Mod: CPTII,S$GLB,, | Performed by: PODIATRIST

## 2021-10-11 PROCEDURE — 3052F PR MOST RECENT HEMOGLOBIN A1C LEVEL 8.0 - < 9.0%: ICD-10-PCS | Mod: CPTII,S$GLB,, | Performed by: PODIATRIST

## 2021-10-11 PROCEDURE — 3288F FALL RISK ASSESSMENT DOCD: CPT | Mod: CPTII,S$GLB,, | Performed by: PODIATRIST

## 2021-10-11 PROCEDURE — 99203 PR OFFICE/OUTPT VISIT, NEW, LEVL III, 30-44 MIN: ICD-10-PCS | Mod: 25,S$GLB,, | Performed by: PODIATRIST

## 2021-10-11 PROCEDURE — 3052F HG A1C>EQUAL 8.0%<EQUAL 9.0%: CPT | Mod: CPTII,S$GLB,, | Performed by: PODIATRIST

## 2021-10-11 PROCEDURE — 4010F PR ACE/ARB THEARPY RXD/TAKEN: ICD-10-PCS | Mod: CPTII,S$GLB,, | Performed by: PODIATRIST

## 2021-10-11 PROCEDURE — 11721 PR DEBRIDEMENT OF NAILS, 6 OR MORE: ICD-10-PCS | Mod: Q9,S$GLB,, | Performed by: PODIATRIST

## 2021-10-11 PROCEDURE — 1101F PT FALLS ASSESS-DOCD LE1/YR: CPT | Mod: CPTII,S$GLB,, | Performed by: PODIATRIST

## 2021-10-11 PROCEDURE — 4010F ACE/ARB THERAPY RXD/TAKEN: CPT | Mod: CPTII,S$GLB,, | Performed by: PODIATRIST

## 2021-10-11 PROCEDURE — 11721 DEBRIDE NAIL 6 OR MORE: CPT | Mod: Q9,S$GLB,, | Performed by: PODIATRIST

## 2021-10-11 PROCEDURE — 1159F MED LIST DOCD IN RCRD: CPT | Mod: CPTII,S$GLB,, | Performed by: PODIATRIST

## 2021-10-11 PROCEDURE — 99203 OFFICE O/P NEW LOW 30 MIN: CPT | Mod: 25,S$GLB,, | Performed by: PODIATRIST

## 2021-10-11 PROCEDURE — 3288F PR FALLS RISK ASSESSMENT DOCUMENTED: ICD-10-PCS | Mod: CPTII,S$GLB,, | Performed by: PODIATRIST

## 2021-10-11 PROCEDURE — 99999 PR PBB SHADOW E&M-EST. PATIENT-LVL IV: CPT | Mod: PBBFAC,,, | Performed by: PODIATRIST

## 2021-12-29 ENCOUNTER — OFFICE VISIT (OUTPATIENT)
Dept: PODIATRY | Facility: CLINIC | Age: 66
End: 2021-12-29
Payer: MEDICARE

## 2021-12-29 VITALS — BODY MASS INDEX: 37.03 KG/M2 | WEIGHT: 209 LBS | HEIGHT: 63 IN

## 2021-12-29 DIAGNOSIS — L60.0 ONYCHOCRYPTOSIS: ICD-10-CM

## 2021-12-29 DIAGNOSIS — E11.49 OTHER DIABETIC NEUROLOGICAL COMPLICATION ASSOCIATED WITH TYPE 2 DIABETES MELLITUS: ICD-10-CM

## 2021-12-29 DIAGNOSIS — E11.51 TYPE II DIABETES MELLITUS WITH PERIPHERAL CIRCULATORY DISORDER: Primary | ICD-10-CM

## 2021-12-29 DIAGNOSIS — L60.3 ONYCHODYSTROPHY: ICD-10-CM

## 2021-12-29 PROCEDURE — 4010F PR ACE/ARB THEARPY RXD/TAKEN: ICD-10-PCS | Mod: CPTII,S$GLB,, | Performed by: PODIATRIST

## 2021-12-29 PROCEDURE — 3288F PR FALLS RISK ASSESSMENT DOCUMENTED: ICD-10-PCS | Mod: CPTII,S$GLB,, | Performed by: PODIATRIST

## 2021-12-29 PROCEDURE — 11721 DEBRIDE NAIL 6 OR MORE: CPT | Mod: Q9,S$GLB,, | Performed by: PODIATRIST

## 2021-12-29 PROCEDURE — 1160F PR REVIEW ALL MEDS BY PRESCRIBER/CLIN PHARMACIST DOCUMENTED: ICD-10-PCS | Mod: CPTII,S$GLB,, | Performed by: PODIATRIST

## 2021-12-29 PROCEDURE — 4010F ACE/ARB THERAPY RXD/TAKEN: CPT | Mod: CPTII,S$GLB,, | Performed by: PODIATRIST

## 2021-12-29 PROCEDURE — 99999 PR PBB SHADOW E&M-EST. PATIENT-LVL IV: CPT | Mod: PBBFAC,,, | Performed by: PODIATRIST

## 2021-12-29 PROCEDURE — 99213 OFFICE O/P EST LOW 20 MIN: CPT | Mod: 25,S$GLB,, | Performed by: PODIATRIST

## 2021-12-29 PROCEDURE — 1160F RVW MEDS BY RX/DR IN RCRD: CPT | Mod: CPTII,S$GLB,, | Performed by: PODIATRIST

## 2021-12-29 PROCEDURE — 3288F FALL RISK ASSESSMENT DOCD: CPT | Mod: CPTII,S$GLB,, | Performed by: PODIATRIST

## 2021-12-29 PROCEDURE — 99213 PR OFFICE/OUTPT VISIT, EST, LEVL III, 20-29 MIN: ICD-10-PCS | Mod: 25,S$GLB,, | Performed by: PODIATRIST

## 2021-12-29 PROCEDURE — 3052F HG A1C>EQUAL 8.0%<EQUAL 9.0%: CPT | Mod: CPTII,S$GLB,, | Performed by: PODIATRIST

## 2021-12-29 PROCEDURE — 1159F MED LIST DOCD IN RCRD: CPT | Mod: CPTII,S$GLB,, | Performed by: PODIATRIST

## 2021-12-29 PROCEDURE — 1125F AMNT PAIN NOTED PAIN PRSNT: CPT | Mod: CPTII,S$GLB,, | Performed by: PODIATRIST

## 2021-12-29 PROCEDURE — 1125F PR PAIN SEVERITY QUANTIFIED, PAIN PRESENT: ICD-10-PCS | Mod: CPTII,S$GLB,, | Performed by: PODIATRIST

## 2021-12-29 PROCEDURE — 1101F PR PT FALLS ASSESS DOC 0-1 FALLS W/OUT INJ PAST YR: ICD-10-PCS | Mod: CPTII,S$GLB,, | Performed by: PODIATRIST

## 2021-12-29 PROCEDURE — 3008F BODY MASS INDEX DOCD: CPT | Mod: CPTII,S$GLB,, | Performed by: PODIATRIST

## 2021-12-29 PROCEDURE — 11721 PR DEBRIDEMENT OF NAILS, 6 OR MORE: ICD-10-PCS | Mod: Q9,S$GLB,, | Performed by: PODIATRIST

## 2021-12-29 PROCEDURE — 99999 PR PBB SHADOW E&M-EST. PATIENT-LVL IV: ICD-10-PCS | Mod: PBBFAC,,, | Performed by: PODIATRIST

## 2021-12-29 PROCEDURE — 3052F PR MOST RECENT HEMOGLOBIN A1C LEVEL 8.0 - < 9.0%: ICD-10-PCS | Mod: CPTII,S$GLB,, | Performed by: PODIATRIST

## 2021-12-29 PROCEDURE — 3008F PR BODY MASS INDEX (BMI) DOCUMENTED: ICD-10-PCS | Mod: CPTII,S$GLB,, | Performed by: PODIATRIST

## 2021-12-29 PROCEDURE — 1159F PR MEDICATION LIST DOCUMENTED IN MEDICAL RECORD: ICD-10-PCS | Mod: CPTII,S$GLB,, | Performed by: PODIATRIST

## 2021-12-29 PROCEDURE — 1101F PT FALLS ASSESS-DOCD LE1/YR: CPT | Mod: CPTII,S$GLB,, | Performed by: PODIATRIST

## 2022-01-31 ENCOUNTER — OFFICE VISIT (OUTPATIENT)
Dept: INTERNAL MEDICINE | Facility: CLINIC | Age: 67
End: 2022-01-31
Payer: MEDICARE

## 2022-01-31 ENCOUNTER — LAB VISIT (OUTPATIENT)
Dept: LAB | Facility: HOSPITAL | Age: 67
End: 2022-01-31
Attending: INTERNAL MEDICINE
Payer: MEDICARE

## 2022-01-31 VITALS
SYSTOLIC BLOOD PRESSURE: 118 MMHG | BODY MASS INDEX: 36.68 KG/M2 | HEIGHT: 63 IN | OXYGEN SATURATION: 94 % | WEIGHT: 207 LBS | TEMPERATURE: 98 F | DIASTOLIC BLOOD PRESSURE: 70 MMHG | RESPIRATION RATE: 18 BRPM | HEART RATE: 93 BPM

## 2022-01-31 DIAGNOSIS — E03.9 HYPOTHYROIDISM (ACQUIRED): ICD-10-CM

## 2022-01-31 DIAGNOSIS — E11.65 UNCONTROLLED TYPE 2 DIABETES MELLITUS WITH HYPERGLYCEMIA, WITH LONG-TERM CURRENT USE OF INSULIN: ICD-10-CM

## 2022-01-31 DIAGNOSIS — E78.5 HYPERLIPIDEMIA LDL GOAL <70: ICD-10-CM

## 2022-01-31 DIAGNOSIS — N28.9 RENAL INSUFFICIENCY: ICD-10-CM

## 2022-01-31 DIAGNOSIS — Z79.4 UNCONTROLLED TYPE 2 DIABETES MELLITUS WITH HYPERGLYCEMIA, WITH LONG-TERM CURRENT USE OF INSULIN: ICD-10-CM

## 2022-01-31 DIAGNOSIS — I10 HYPERTENSION GOAL BP (BLOOD PRESSURE) < 130/80: Primary | ICD-10-CM

## 2022-01-31 DIAGNOSIS — Z78.0 ASYMPTOMATIC MENOPAUSE: ICD-10-CM

## 2022-01-31 LAB
ANION GAP SERPL CALC-SCNC: 10 MMOL/L (ref 8–16)
BUN SERPL-MCNC: 18 MG/DL (ref 8–23)
CALCIUM SERPL-MCNC: 10.1 MG/DL (ref 8.7–10.5)
CHLORIDE SERPL-SCNC: 101 MMOL/L (ref 95–110)
CO2 SERPL-SCNC: 28 MMOL/L (ref 23–29)
CREAT SERPL-MCNC: 1.1 MG/DL (ref 0.5–1.4)
EST. GFR  (AFRICAN AMERICAN): >60 ML/MIN/1.73 M^2
EST. GFR  (NON AFRICAN AMERICAN): 52.1 ML/MIN/1.73 M^2
GLUCOSE SERPL-MCNC: 184 MG/DL (ref 70–110)
POTASSIUM SERPL-SCNC: 4.6 MMOL/L (ref 3.5–5.1)
SODIUM SERPL-SCNC: 139 MMOL/L (ref 136–145)

## 2022-01-31 PROCEDURE — 36415 COLL VENOUS BLD VENIPUNCTURE: CPT | Performed by: INTERNAL MEDICINE

## 2022-01-31 PROCEDURE — 3051F HG A1C>EQUAL 7.0%<8.0%: CPT | Mod: CPTII,S$GLB,, | Performed by: INTERNAL MEDICINE

## 2022-01-31 PROCEDURE — 1159F PR MEDICATION LIST DOCUMENTED IN MEDICAL RECORD: ICD-10-PCS | Mod: CPTII,S$GLB,, | Performed by: INTERNAL MEDICINE

## 2022-01-31 PROCEDURE — 1159F MED LIST DOCD IN RCRD: CPT | Mod: CPTII,S$GLB,, | Performed by: INTERNAL MEDICINE

## 2022-01-31 PROCEDURE — 3078F PR MOST RECENT DIASTOLIC BLOOD PRESSURE < 80 MM HG: ICD-10-PCS | Mod: CPTII,S$GLB,, | Performed by: INTERNAL MEDICINE

## 2022-01-31 PROCEDURE — 1126F AMNT PAIN NOTED NONE PRSNT: CPT | Mod: CPTII,S$GLB,, | Performed by: INTERNAL MEDICINE

## 2022-01-31 PROCEDURE — 1160F PR REVIEW ALL MEDS BY PRESCRIBER/CLIN PHARMACIST DOCUMENTED: ICD-10-PCS | Mod: CPTII,S$GLB,, | Performed by: INTERNAL MEDICINE

## 2022-01-31 PROCEDURE — 3288F PR FALLS RISK ASSESSMENT DOCUMENTED: ICD-10-PCS | Mod: CPTII,S$GLB,, | Performed by: INTERNAL MEDICINE

## 2022-01-31 PROCEDURE — 99214 OFFICE O/P EST MOD 30 MIN: CPT | Mod: S$GLB,,, | Performed by: INTERNAL MEDICINE

## 2022-01-31 PROCEDURE — 3008F BODY MASS INDEX DOCD: CPT | Mod: CPTII,S$GLB,, | Performed by: INTERNAL MEDICINE

## 2022-01-31 PROCEDURE — 3288F FALL RISK ASSESSMENT DOCD: CPT | Mod: CPTII,S$GLB,, | Performed by: INTERNAL MEDICINE

## 2022-01-31 PROCEDURE — 3074F PR MOST RECENT SYSTOLIC BLOOD PRESSURE < 130 MM HG: ICD-10-PCS | Mod: CPTII,S$GLB,, | Performed by: INTERNAL MEDICINE

## 2022-01-31 PROCEDURE — 99999 PR PBB SHADOW E&M-EST. PATIENT-LVL IV: ICD-10-PCS | Mod: PBBFAC,,, | Performed by: INTERNAL MEDICINE

## 2022-01-31 PROCEDURE — 80048 BASIC METABOLIC PNL TOTAL CA: CPT | Performed by: INTERNAL MEDICINE

## 2022-01-31 PROCEDURE — 3008F PR BODY MASS INDEX (BMI) DOCUMENTED: ICD-10-PCS | Mod: CPTII,S$GLB,, | Performed by: INTERNAL MEDICINE

## 2022-01-31 PROCEDURE — 1126F PR PAIN SEVERITY QUANTIFIED, NO PAIN PRESENT: ICD-10-PCS | Mod: CPTII,S$GLB,, | Performed by: INTERNAL MEDICINE

## 2022-01-31 PROCEDURE — 1101F PT FALLS ASSESS-DOCD LE1/YR: CPT | Mod: CPTII,S$GLB,, | Performed by: INTERNAL MEDICINE

## 2022-01-31 PROCEDURE — 3078F DIAST BP <80 MM HG: CPT | Mod: CPTII,S$GLB,, | Performed by: INTERNAL MEDICINE

## 2022-01-31 PROCEDURE — 1160F RVW MEDS BY RX/DR IN RCRD: CPT | Mod: CPTII,S$GLB,, | Performed by: INTERNAL MEDICINE

## 2022-01-31 PROCEDURE — 3051F PR MOST RECENT HEMOGLOBIN A1C LEVEL 7.0 - < 8.0%: ICD-10-PCS | Mod: CPTII,S$GLB,, | Performed by: INTERNAL MEDICINE

## 2022-01-31 PROCEDURE — 3074F SYST BP LT 130 MM HG: CPT | Mod: CPTII,S$GLB,, | Performed by: INTERNAL MEDICINE

## 2022-01-31 PROCEDURE — 1101F PR PT FALLS ASSESS DOC 0-1 FALLS W/OUT INJ PAST YR: ICD-10-PCS | Mod: CPTII,S$GLB,, | Performed by: INTERNAL MEDICINE

## 2022-01-31 PROCEDURE — 99999 PR PBB SHADOW E&M-EST. PATIENT-LVL IV: CPT | Mod: PBBFAC,,, | Performed by: INTERNAL MEDICINE

## 2022-01-31 PROCEDURE — 99214 PR OFFICE/OUTPT VISIT, EST, LEVL IV, 30-39 MIN: ICD-10-PCS | Mod: S$GLB,,, | Performed by: INTERNAL MEDICINE

## 2022-01-31 RX ORDER — LEVOTHYROXINE SODIUM 112 UG/1
112 TABLET ORAL DAILY
Qty: 90 TABLET | Refills: 3 | Status: SHIPPED | OUTPATIENT
Start: 2022-01-31 | End: 2022-04-26

## 2022-01-31 RX ORDER — CLOPIDOGREL BISULFATE 75 MG/1
75 TABLET ORAL DAILY
COMMUNITY
Start: 2022-01-03

## 2022-01-31 RX ORDER — DILTIAZEM HYDROCHLORIDE 240 MG/1
240 CAPSULE, EXTENDED RELEASE ORAL DAILY
COMMUNITY

## 2022-01-31 RX ORDER — CARVEDILOL 25 MG/1
25 TABLET ORAL 2 TIMES DAILY
COMMUNITY
Start: 2022-01-03 | End: 2023-06-26

## 2022-01-31 NOTE — PROGRESS NOTES
Patient, Krysta Manuel (MRN #[id number]), presented with a recorded BMI of 36.67 kg/m^2 and a documented comorbidity(s):  - Diabetes Mellitus Type 2  - Hypertension  - Hyperlipidemia  to which the severe obesity is a contributing factor. This is consistent with the definition of severe obesity (BMI 35.0-39.9) with comorbidity (ICD-10 E66.01, Z68.35). The patient's severe obesity was monitored, evaluated, addressed and/or treated. This addendum to the medical record is made on 01/31/2022.

## 2022-01-31 NOTE — PROGRESS NOTES
Subjective:       Patient ID: Krysta Manuel is a 66 y.o. female.    Chief Complaint: Establish Care    66 y.o. Black or  female     Patient presents with:  Establish Care    HPI: Here today to establish care. She is new to me. Her previous PCP was Dr. Amandeep Sen.   She is here with her daughter.   HTN--stable on irbesartan-HCTZ, metoprolol and diltiazem.   Recent labs show reduced kidney function. She denies taking NSAID's. She admits to not drinking enough water.   DM--compliant with glipizide, Trulicity and insulin. She checks her glucoses regularly and gets fluctuating readings.              HGBA1C                   7.8                 12/29/2021            HLD--compliant with rosuvastatin.                    LDLCALC                  37                  12/29/2021                 CHOL                     96                  12/29/2021                 TRIG                     132                 12/29/2021                 HDL                      37 (L)              12/29/2021                 ALT                      23                  12/29/2021                 AST                      22                  12/29/2021                 NA                       139                 12/29/2021                 K                        4.9                 12/29/2021                 CL                       100                 12/29/2021                 CREATININE               1.14 (H)            12/29/2021                 BUN                      27 (H)              12/29/2021                 CO2                      26                  12/29/2021     Hypothyroidism--stable on levothyroxine. She needs refills.               TSH                      1.95                12/29/2021              Past Medical History:  Arthritis  Depression  DM II (diabetes mellitus, type II), controlled  Heart disease  HTN (hypertension)  Hyperlipidemia  Hypothyroidism  Insomnia  2010: Stroke  Vitamin D  deficiency    Past Surgical History:  COLONOSCOPY  HYSTERECTOMY  THYROIDECTOMY  TOTAL ABDOMINAL HYSTERECTOMY W/ BILATERAL SALPINGOOPHORECTOMY    Current Outpatient Medications on File Prior to Visit:  aspirin (ECOTRIN) 81 MG EC tablet, Take 1 tablet (81 mg total) by mouth once daily., Disp: 100 tablet, Rfl: 0  blood sugar diagnostic Strp, To use 3 times daily, Disp: 200 strip, Rfl: 10  brompheniramine-pseudoeph-DM (BROMFED DM) 2-30-10 mg/5 mL Syrp, TAKE 10ML BY MOUTH EVERY 8 HOURS AS NEEDED, Disp: , Rfl:   carvediloL (COREG) 25 MG tablet, Take 25 mg by mouth 2 (two) times daily., Disp: , Rfl:   cholecalciferol, vitamin D3, 1,250 mcg (50,000 unit) capsule, Take 50,000 Units by mouth every 7 days., Disp: , Rfl:   clopidogreL (PLAVIX) 75 mg tablet, Take 75 mg by mouth once daily., Disp: , Rfl:   diltiaZEM (CARDIZEM CD) 180 MG 24 hr capsule, Take 1 capsule (180 mg total) by mouth 2 (two) times daily., Disp: 180 capsule, Rfl: 1  diltiaZEM (DILACOR XR) 240 MG CDCR, Take 240 mg by mouth once daily., Disp: , Rfl:   doxazosin (CARDURA) 4 MG tablet, Take 2 mg by mouth nightly., Disp: , Rfl:   doxycycline (VIBRAMYCIN) 100 MG Cap, Take 1 capsule (100 mg total) by mouth 2 (two) times daily., Disp: 14 capsule, Rfl: 0  EASY TOUCH LANCING DEVICE Misc, , Disp: , Rfl:   EScitalopram oxalate (LEXAPRO) 20 MG tablet, Take 1 tablet (20 mg total) by mouth once daily., Disp: 90 tablet, Rfl: 1  esomeprazole (NEXIUM) 20 MG capsule, TWO CAPSULES BY MOUTH IN THE MORNING BEFORE BREAKFAST, Disp: 60 capsule, Rfl: 1  esomeprazole (NEXIUM) 40 MG capsule, Take 1 capsule (40 mg total) by mouth before breakfast., Disp: 90 capsule, Rfl: 1  fluticasone propionate (FLONASE) 50 mcg/actuation nasal spray, 2 SPRAYS IN EACH NOSTRIL EVERY DAY AS NEEDED, Disp: 16 g, Rfl: 1  glipiZIDE (GLUCOTROL) 10 MG tablet, TAKE 1 TABLET BY MOUTH ONCE DAILY., Disp: 90 tablet, Rfl: 1  HYDROcodone-acetaminophen (NORCO)  mg per tablet, , Disp: , Rfl:  "0  hydrocodone-chlorpheniramine (TUSSIONEX) 10-8 mg/5 mL suspension, TAKE 1 TEASPOON BY MOUTH EVERY 12 HOURS AS NEEDED FOR COUGH, Disp: , Rfl:   insulin lispro protamine-insulin lispro (HUMALOG MIX 75-25,U-100,INSULN) 100 unit/mL (75-25) Susp, Inject 30 Units into the skin daily with breakfast., Disp: 30 mL, Rfl: 11  irbesartan-hydrochlorothiazide (AVALIDE) 300-12.5 mg per tablet, TAKE ONE TABLET BY MOUTH EVERY DAY FOR HIGH BLOOD PRESSURE, Disp: 90 tablet, Rfl: 1  LANTUS SOLOSTAR U-100 INSULIN glargine 100 units/mL (3mL) SubQ pen, INJECT 50 UNITS INTO THE SKIN ONCE DAILY., Disp: 15 mL, Rfl: 1  levocetirizine (XYZAL) 5 MG tablet, TAKE ONE TAB BY MOUTH ONCE DAILY AS NEEDED ALLERGIES, Disp: 90 tablet, Rfl: 1  meloxicam (MOBIC) 7.5 MG tablet, Take 1 tablet (7.5 mg total) by mouth once daily., Disp: 30 tablet, Rfl: 0  memantine (NAMENDA) 10 MG Tab, Take 1 tablet (10 mg total) by mouth 2 (two) times daily., Disp: 180 tablet, Rfl: 2  metoprolol succinate (TOPROL-XL) 50 MG 24 hr tablet, Take 50 mg by mouth once daily., Disp: , Rfl:   ondansetron (ZOFRAN-ODT) 4 MG TbDL, Take 4 mg by mouth every 8 (eight) hours as needed., Disp: , Rfl:   oxyCODONE (ROXICODONE) 5 MG immediate release tablet, Take 5 mg by mouth every 6 (six) hours as needed., Disp: , Rfl:   polyethylene glycol (CLEARLAX) 17 gram/dose powder, MIX 17 GRAMS IN LIQUID EVERY DAY AS NEEDED FOR CONSTIPATION, Disp: 510 g, Rfl: 2  psyllium 0.52 gram capsule, Take 0.52 g by mouth once daily., Disp: , Rfl:   rosuvastatin (CRESTOR) 10 MG tablet, Take 1 tablet (10 mg total) by mouth nightly., Disp: 90 tablet, Rfl: 1  SITagliptan-metformin (JANUMET) 50-1,000 mg per tablet, TAKE ONE TABLET BY MOUTH TWO TIMES A DAY WITH MEALS, Disp: 180 tablet, Rfl: 1  sulindac (CLINORIL) 150 MG tablet, TAKE 1 TABLET BY MOUTH 2 TIMES DAILY. TAKE WITH FOOD, Disp: , Rfl:   SURE COMFORT PEN NEEDLE 32 gauge x 5/32" Ndle, , Disp: , Rfl:   tiZANidine (ZANAFLEX) 4 MG tablet, TAKE ONE TABLET BY MOUTH " TWICE DAILY, Disp: 60 tablet, Rfl: 0  traZODone (DESYREL) 50 MG tablet, TAKE ONE OR TWO TABLETS AS NEEDED FOR INSOMNIA, Disp: 45 tablet, Rfl: 1  TRULICITY 1.5 mg/0.5 mL pen injector, INJECT 1.5 MG INTO THE SKIN EVERY 7 DAYS., Disp: 12 pen, Rfl: 3  VESICARE 5 mg tablet, Take 1 tablet (5 mg total) by mouth once daily., Disp: 30 tablet, Rfl: 3  zolpidem (AMBIEN) 10 mg Tab, TAKE ONE TABLET BY MOUTH AT BEDTIME AS NEEDED MAY CAUSE DROWSINESS, USE CAUTION FOR SLEEP, Disp: , Rfl:   levothyroxine (SYNTHROID) 112 MCG tablet, TAKE 1 TABLET BY MOUTH ONCE DAILY., Disp: 90 tablet, Rfl: 0  lancing device with lancets Kit, 1 each by Misc.(Non-Drug; Combo Route) route 3 (three) times daily., Disp: 200 each, Rfl: 10    Allergies:   Review of patient's allergies indicates:  No Known Allergies            Review of Systems   Constitutional: Negative for fever and unexpected weight change.   Respiratory: Negative for cough and shortness of breath.    Cardiovascular: Negative for chest pain and leg swelling.   Gastrointestinal: Negative for abdominal pain.   Genitourinary: Negative for difficulty urinating and dysuria.   Musculoskeletal: Positive for gait problem (history of CVA--left side weakness).   Neurological: Positive for weakness (left side due to h/o CVA). Negative for dizziness and headaches.         Objective:      Physical Exam  Vitals reviewed.   Constitutional:       General: She is not in acute distress.     Appearance: She is well-developed. She is not ill-appearing.   Eyes:      General: No scleral icterus.  Cardiovascular:      Rate and Rhythm: Normal rate and regular rhythm.      Heart sounds: Normal heart sounds.   Pulmonary:      Effort: Pulmonary effort is normal. No respiratory distress.      Breath sounds: Normal breath sounds. No wheezing or rales.   Abdominal:      General: Bowel sounds are normal.      Palpations: Abdomen is soft.   Skin:     General: Skin is warm and dry.   Neurological:      Mental Status: She  is alert and oriented to person, place, and time.   Psychiatric:         Behavior: Behavior normal.      .   Assessment:       Problem List Items Addressed This Visit    None     Visit Diagnoses     Hypertension goal BP (blood pressure) < 130/80    -  Primary    Relevant Orders    Comprehensive Metabolic Panel    Lipid Panel    Renal insufficiency        Relevant Orders    Basic Metabolic Panel    Uncontrolled type 2 diabetes mellitus with hyperglycemia, with long-term current use of insulin        Relevant Orders    Comprehensive Metabolic Panel    Hemoglobin A1C    Lipid Panel    Microalbumin/Creatinine Ratio, Urine    Hyperlipidemia LDL goal <70        Relevant Orders    Comprehensive Metabolic Panel    Lipid Panel    Hypothyroidism (acquired)        Relevant Medications    levothyroxine (SYNTHROID) 112 MCG tablet    Other Relevant Orders    TSH    Asymptomatic menopause        Relevant Orders    DXA Bone Density Spine And Hip          Plan:       Krysta was seen today for establish care.    Diagnoses and all orders for this visit:    Hypertension goal BP (blood pressure) < 130/80  -     Continue current management  -     Comprehensive Metabolic Panel; Standing  -     Lipid Panel; Standing    Renal insufficiency  -     Increase water intake  -     Avoid NSAID's  -     Basic Metabolic Panel; Future    Uncontrolled type 2 diabetes mellitus with hyperglycemia, with long-term current use of insulin  -     Lifestyle modifications discussed  -     Continue glipizide and insulin  -     Continue glucose monitoring  -     Comprehensive Metabolic Panel; Standing  -     Hemoglobin A1C; Standing  -     Lipid Panel; Standing  -     Microalbumin/Creatinine Ratio, Urine    Hyperlipidemia LDL goal <70  -     Continue rosuvastatin  -     Comprehensive Metabolic Panel; Standing  -     Lipid Panel; Standing    Hypothyroidism (acquired)  -     TSH; Standing  -     Refill levothyroxine (SYNTHROID) 112 MCG tablet; Take 1 tablet (112  mcg total) by mouth once daily.    Asymptomatic menopause  -     DXA Bone Density Spine And Hip; Future    Labs and f/u in 3 months.

## 2022-02-02 DIAGNOSIS — E11.9 TYPE 2 DIABETES MELLITUS WITHOUT COMPLICATION: ICD-10-CM

## 2022-02-03 ENCOUNTER — LAB VISIT (OUTPATIENT)
Dept: LAB | Facility: HOSPITAL | Age: 67
End: 2022-02-03
Attending: INTERNAL MEDICINE
Payer: MEDICARE

## 2022-02-03 DIAGNOSIS — E11.9 TYPE 2 DIABETES MELLITUS WITHOUT COMPLICATION: ICD-10-CM

## 2022-02-03 PROCEDURE — 82570 ASSAY OF URINE CREATININE: CPT | Performed by: INTERNAL MEDICINE

## 2022-02-04 LAB
ALBUMIN/CREAT UR: 4.6 UG/MG (ref 0–30)
CREAT UR-MCNC: 174 MG/DL (ref 15–325)
MICROALBUMIN UR DL<=1MG/L-MCNC: 8 UG/ML

## 2022-03-31 ENCOUNTER — OFFICE VISIT (OUTPATIENT)
Dept: PODIATRY | Facility: CLINIC | Age: 67
End: 2022-03-31
Payer: MEDICARE

## 2022-03-31 VITALS — HEIGHT: 63 IN | BODY MASS INDEX: 36.68 KG/M2 | WEIGHT: 207 LBS

## 2022-03-31 DIAGNOSIS — I69.354 HEMIPARESIS AFFECTING LEFT SIDE AS LATE EFFECT OF CEREBROVASCULAR ACCIDENT: ICD-10-CM

## 2022-03-31 DIAGNOSIS — E11.49 OTHER DIABETIC NEUROLOGICAL COMPLICATION ASSOCIATED WITH TYPE 2 DIABETES MELLITUS: ICD-10-CM

## 2022-03-31 DIAGNOSIS — L60.3 ONYCHODYSTROPHY: ICD-10-CM

## 2022-03-31 DIAGNOSIS — E11.51 TYPE II DIABETES MELLITUS WITH PERIPHERAL CIRCULATORY DISORDER: Primary | ICD-10-CM

## 2022-03-31 PROCEDURE — 99499 UNLISTED E&M SERVICE: CPT | Mod: S$GLB,,, | Performed by: PODIATRIST

## 2022-03-31 PROCEDURE — 99999 PR PBB SHADOW E&M-EST. PATIENT-LVL IV: ICD-10-PCS | Mod: PBBFAC,,, | Performed by: PODIATRIST

## 2022-03-31 PROCEDURE — 1159F MED LIST DOCD IN RCRD: CPT | Mod: CPTII,S$GLB,, | Performed by: PODIATRIST

## 2022-03-31 PROCEDURE — 3008F PR BODY MASS INDEX (BMI) DOCUMENTED: ICD-10-PCS | Mod: CPTII,S$GLB,, | Performed by: PODIATRIST

## 2022-03-31 PROCEDURE — 11721 PR DEBRIDEMENT OF NAILS, 6 OR MORE: ICD-10-PCS | Mod: Q9,S$GLB,, | Performed by: PODIATRIST

## 2022-03-31 PROCEDURE — 4010F PR ACE/ARB THEARPY RXD/TAKEN: ICD-10-PCS | Mod: CPTII,S$GLB,, | Performed by: PODIATRIST

## 2022-03-31 PROCEDURE — 1126F AMNT PAIN NOTED NONE PRSNT: CPT | Mod: CPTII,S$GLB,, | Performed by: PODIATRIST

## 2022-03-31 PROCEDURE — 3061F NEG MICROALBUMINURIA REV: CPT | Mod: CPTII,S$GLB,, | Performed by: PODIATRIST

## 2022-03-31 PROCEDURE — 4010F ACE/ARB THERAPY RXD/TAKEN: CPT | Mod: CPTII,S$GLB,, | Performed by: PODIATRIST

## 2022-03-31 PROCEDURE — 1126F PR PAIN SEVERITY QUANTIFIED, NO PAIN PRESENT: ICD-10-PCS | Mod: CPTII,S$GLB,, | Performed by: PODIATRIST

## 2022-03-31 PROCEDURE — 99499 NO LOS: ICD-10-PCS | Mod: S$GLB,,, | Performed by: PODIATRIST

## 2022-03-31 PROCEDURE — 1159F PR MEDICATION LIST DOCUMENTED IN MEDICAL RECORD: ICD-10-PCS | Mod: CPTII,S$GLB,, | Performed by: PODIATRIST

## 2022-03-31 PROCEDURE — 3008F BODY MASS INDEX DOCD: CPT | Mod: CPTII,S$GLB,, | Performed by: PODIATRIST

## 2022-03-31 PROCEDURE — 3066F PR DOCUMENTATION OF TREATMENT FOR NEPHROPATHY: ICD-10-PCS | Mod: CPTII,S$GLB,, | Performed by: PODIATRIST

## 2022-03-31 PROCEDURE — 3061F PR NEG MICROALBUMINURIA RESULT DOCUMENTED/REVIEW: ICD-10-PCS | Mod: CPTII,S$GLB,, | Performed by: PODIATRIST

## 2022-03-31 PROCEDURE — 3066F NEPHROPATHY DOC TX: CPT | Mod: CPTII,S$GLB,, | Performed by: PODIATRIST

## 2022-03-31 PROCEDURE — 11721 DEBRIDE NAIL 6 OR MORE: CPT | Mod: Q9,S$GLB,, | Performed by: PODIATRIST

## 2022-03-31 PROCEDURE — 99999 PR PBB SHADOW E&M-EST. PATIENT-LVL IV: CPT | Mod: PBBFAC,,, | Performed by: PODIATRIST

## 2022-04-19 ENCOUNTER — APPOINTMENT (OUTPATIENT)
Dept: RADIOLOGY | Facility: HOSPITAL | Age: 67
End: 2022-04-19
Attending: FAMILY MEDICINE
Payer: MEDICARE

## 2022-04-19 DIAGNOSIS — E28.39 RESISTANT OVARY SYNDROME: ICD-10-CM

## 2022-04-19 PROCEDURE — 77080 DEXA BONE DENSITY SPINE HIP: ICD-10-PCS | Mod: 26,,, | Performed by: RADIOLOGY

## 2022-04-19 PROCEDURE — 77080 DXA BONE DENSITY AXIAL: CPT | Mod: 26,,, | Performed by: RADIOLOGY

## 2022-04-19 PROCEDURE — 77080 DXA BONE DENSITY AXIAL: CPT | Mod: TC

## 2022-04-25 NOTE — TELEPHONE ENCOUNTER
No new care gaps identified.  Powered by [employer]. Reference number: [id number].   4/25/2022 2:01:23 PM CDT

## 2022-04-26 ENCOUNTER — OFFICE VISIT (OUTPATIENT)
Dept: INTERNAL MEDICINE | Facility: CLINIC | Age: 67
End: 2022-04-26
Payer: MEDICARE

## 2022-04-26 ENCOUNTER — TELEPHONE (OUTPATIENT)
Dept: DIABETES | Facility: CLINIC | Age: 67
End: 2022-04-26
Payer: MEDICARE

## 2022-04-26 VITALS
DIASTOLIC BLOOD PRESSURE: 88 MMHG | HEART RATE: 92 BPM | WEIGHT: 211.63 LBS | OXYGEN SATURATION: 96 % | SYSTOLIC BLOOD PRESSURE: 144 MMHG | TEMPERATURE: 97 F | HEIGHT: 63 IN | RESPIRATION RATE: 18 BRPM | BODY MASS INDEX: 37.5 KG/M2

## 2022-04-26 DIAGNOSIS — F32.9 MAJOR DEPRESSIVE DISORDER, REMISSION STATUS UNSPECIFIED, UNSPECIFIED WHETHER RECURRENT: ICD-10-CM

## 2022-04-26 DIAGNOSIS — F03.90 DEMENTIA WITHOUT BEHAVIORAL DISTURBANCE, UNSPECIFIED DEMENTIA TYPE: ICD-10-CM

## 2022-04-26 DIAGNOSIS — I10 ESSENTIAL HYPERTENSION: ICD-10-CM

## 2022-04-26 DIAGNOSIS — F41.9 ANXIETY: ICD-10-CM

## 2022-04-26 DIAGNOSIS — I51.9 HEART DISEASE: ICD-10-CM

## 2022-04-26 DIAGNOSIS — E03.9 HYPOTHYROIDISM, UNSPECIFIED TYPE: ICD-10-CM

## 2022-04-26 DIAGNOSIS — E78.5 HYPERLIPIDEMIA, UNSPECIFIED HYPERLIPIDEMIA TYPE: ICD-10-CM

## 2022-04-26 DIAGNOSIS — E11.51 TYPE II DIABETES MELLITUS WITH PERIPHERAL CIRCULATORY DISORDER: Primary | ICD-10-CM

## 2022-04-26 PROCEDURE — 3052F HG A1C>EQUAL 8.0%<EQUAL 9.0%: CPT | Mod: CPTII,S$GLB,, | Performed by: PHYSICIAN ASSISTANT

## 2022-04-26 PROCEDURE — 3077F SYST BP >= 140 MM HG: CPT | Mod: CPTII,S$GLB,, | Performed by: PHYSICIAN ASSISTANT

## 2022-04-26 PROCEDURE — 4010F PR ACE/ARB THEARPY RXD/TAKEN: ICD-10-PCS | Mod: CPTII,S$GLB,, | Performed by: PHYSICIAN ASSISTANT

## 2022-04-26 PROCEDURE — 4010F ACE/ARB THERAPY RXD/TAKEN: CPT | Mod: CPTII,S$GLB,, | Performed by: PHYSICIAN ASSISTANT

## 2022-04-26 PROCEDURE — 99214 OFFICE O/P EST MOD 30 MIN: CPT | Mod: S$GLB,,, | Performed by: PHYSICIAN ASSISTANT

## 2022-04-26 PROCEDURE — 1159F PR MEDICATION LIST DOCUMENTED IN MEDICAL RECORD: ICD-10-PCS | Mod: CPTII,S$GLB,, | Performed by: PHYSICIAN ASSISTANT

## 2022-04-26 PROCEDURE — 3066F NEPHROPATHY DOC TX: CPT | Mod: CPTII,S$GLB,, | Performed by: PHYSICIAN ASSISTANT

## 2022-04-26 PROCEDURE — 1160F RVW MEDS BY RX/DR IN RCRD: CPT | Mod: CPTII,S$GLB,, | Performed by: PHYSICIAN ASSISTANT

## 2022-04-26 PROCEDURE — 1101F PT FALLS ASSESS-DOCD LE1/YR: CPT | Mod: CPTII,S$GLB,, | Performed by: PHYSICIAN ASSISTANT

## 2022-04-26 PROCEDURE — 3079F PR MOST RECENT DIASTOLIC BLOOD PRESSURE 80-89 MM HG: ICD-10-PCS | Mod: CPTII,S$GLB,, | Performed by: PHYSICIAN ASSISTANT

## 2022-04-26 PROCEDURE — 3079F DIAST BP 80-89 MM HG: CPT | Mod: CPTII,S$GLB,, | Performed by: PHYSICIAN ASSISTANT

## 2022-04-26 PROCEDURE — 99999 PR PBB SHADOW E&M-EST. PATIENT-LVL V: CPT | Mod: PBBFAC,,, | Performed by: PHYSICIAN ASSISTANT

## 2022-04-26 PROCEDURE — 3008F BODY MASS INDEX DOCD: CPT | Mod: CPTII,S$GLB,, | Performed by: PHYSICIAN ASSISTANT

## 2022-04-26 PROCEDURE — 3288F FALL RISK ASSESSMENT DOCD: CPT | Mod: CPTII,S$GLB,, | Performed by: PHYSICIAN ASSISTANT

## 2022-04-26 PROCEDURE — 3052F PR MOST RECENT HEMOGLOBIN A1C LEVEL 8.0 - < 9.0%: ICD-10-PCS | Mod: CPTII,S$GLB,, | Performed by: PHYSICIAN ASSISTANT

## 2022-04-26 PROCEDURE — 3288F PR FALLS RISK ASSESSMENT DOCUMENTED: ICD-10-PCS | Mod: CPTII,S$GLB,, | Performed by: PHYSICIAN ASSISTANT

## 2022-04-26 PROCEDURE — 3061F NEG MICROALBUMINURIA REV: CPT | Mod: CPTII,S$GLB,, | Performed by: PHYSICIAN ASSISTANT

## 2022-04-26 PROCEDURE — 1160F PR REVIEW ALL MEDS BY PRESCRIBER/CLIN PHARMACIST DOCUMENTED: ICD-10-PCS | Mod: CPTII,S$GLB,, | Performed by: PHYSICIAN ASSISTANT

## 2022-04-26 PROCEDURE — 3061F PR NEG MICROALBUMINURIA RESULT DOCUMENTED/REVIEW: ICD-10-PCS | Mod: CPTII,S$GLB,, | Performed by: PHYSICIAN ASSISTANT

## 2022-04-26 PROCEDURE — 3066F PR DOCUMENTATION OF TREATMENT FOR NEPHROPATHY: ICD-10-PCS | Mod: CPTII,S$GLB,, | Performed by: PHYSICIAN ASSISTANT

## 2022-04-26 PROCEDURE — 1101F PR PT FALLS ASSESS DOC 0-1 FALLS W/OUT INJ PAST YR: ICD-10-PCS | Mod: CPTII,S$GLB,, | Performed by: PHYSICIAN ASSISTANT

## 2022-04-26 PROCEDURE — 3077F PR MOST RECENT SYSTOLIC BLOOD PRESSURE >= 140 MM HG: ICD-10-PCS | Mod: CPTII,S$GLB,, | Performed by: PHYSICIAN ASSISTANT

## 2022-04-26 PROCEDURE — 3008F PR BODY MASS INDEX (BMI) DOCUMENTED: ICD-10-PCS | Mod: CPTII,S$GLB,, | Performed by: PHYSICIAN ASSISTANT

## 2022-04-26 PROCEDURE — 99999 PR PBB SHADOW E&M-EST. PATIENT-LVL V: ICD-10-PCS | Mod: PBBFAC,,, | Performed by: PHYSICIAN ASSISTANT

## 2022-04-26 PROCEDURE — 1159F MED LIST DOCD IN RCRD: CPT | Mod: CPTII,S$GLB,, | Performed by: PHYSICIAN ASSISTANT

## 2022-04-26 PROCEDURE — 99214 PR OFFICE/OUTPT VISIT, EST, LEVL IV, 30-39 MIN: ICD-10-PCS | Mod: S$GLB,,, | Performed by: PHYSICIAN ASSISTANT

## 2022-04-26 RX ORDER — GLIPIZIDE 10 MG/1
10 TABLET ORAL DAILY
Qty: 90 TABLET | Refills: 1 | Status: SHIPPED | OUTPATIENT
Start: 2022-04-26 | End: 2022-05-05

## 2022-04-26 RX ORDER — LEVOTHYROXINE SODIUM 125 UG/1
125 TABLET ORAL
Qty: 30 TABLET | Refills: 11 | Status: SHIPPED | OUTPATIENT
Start: 2022-04-26 | End: 2023-04-11 | Stop reason: SDUPTHER

## 2022-04-26 RX ORDER — DULAGLUTIDE 3 MG/.5ML
3 INJECTION, SOLUTION SUBCUTANEOUS
Qty: 12 PEN | Refills: 1 | Status: SHIPPED | OUTPATIENT
Start: 2022-04-26 | End: 2022-07-25

## 2022-04-26 RX ORDER — METFORMIN HYDROCHLORIDE 500 MG/1
1000 TABLET, EXTENDED RELEASE ORAL 2 TIMES DAILY WITH MEALS
Qty: 360 TABLET | Refills: 1 | Status: SHIPPED | OUTPATIENT
Start: 2022-04-26 | End: 2022-08-26 | Stop reason: SDUPTHER

## 2022-04-26 RX ORDER — ESCITALOPRAM OXALATE 20 MG/1
20 TABLET ORAL DAILY
Qty: 90 TABLET | Refills: 1 | OUTPATIENT
Start: 2022-04-26

## 2022-04-26 RX ORDER — ASPIRIN 81 MG/1
81 TABLET ORAL DAILY
Qty: 90 TABLET | Refills: 1 | Status: SHIPPED | OUTPATIENT
Start: 2022-04-26 | End: 2022-10-12 | Stop reason: SDUPTHER

## 2022-04-26 RX ORDER — ROSUVASTATIN CALCIUM 10 MG/1
10 TABLET, COATED ORAL NIGHTLY
Qty: 90 TABLET | Refills: 1 | Status: SHIPPED | OUTPATIENT
Start: 2022-04-26 | End: 2022-08-26 | Stop reason: SDUPTHER

## 2022-04-26 RX ORDER — ESCITALOPRAM OXALATE 20 MG/1
20 TABLET ORAL DAILY
Qty: 90 TABLET | Refills: 1 | Status: CANCELLED | OUTPATIENT
Start: 2022-04-26

## 2022-04-26 RX ORDER — SERTRALINE HYDROCHLORIDE 100 MG/1
100 TABLET, FILM COATED ORAL DAILY
Qty: 30 TABLET | Refills: 11 | Status: SHIPPED | OUTPATIENT
Start: 2022-04-26 | End: 2022-08-17

## 2022-04-26 RX ORDER — SITAGLIPTIN AND METFORMIN HYDROCHLORIDE 1000; 50 MG/1; MG/1
TABLET, FILM COATED ORAL
Qty: 180 TABLET | Refills: 1 | Status: CANCELLED | OUTPATIENT
Start: 2022-04-26

## 2022-04-26 RX ORDER — IRBESARTAN AND HYDROCHLOROTHIAZIDE 300; 12.5 MG/1; MG/1
TABLET, FILM COATED ORAL
Qty: 90 TABLET | Refills: 1 | Status: SHIPPED | OUTPATIENT
Start: 2022-04-26 | End: 2022-06-06

## 2022-04-26 NOTE — PROGRESS NOTES
"Subjective:      Patient ID: Krysta Manuel is a 66 y.o. female.    Chief Complaint: Follow-up    Patient is new to me, being seen today for 3mth f/u.     HTN- coreg 25mg bid, irbesartan-HCTZ 300-12.5mg, diltiazem 240mg  BP typically varies at home, high at doctor   HLD- on statin therapy   DM- A1c 8.2%, glipizide 10mg, trulicity 1.5mg, janumet 50-1000mg bid, humalog 30 units AM with breakfast, lantus 50units   Blood sugar 150s   Thyroid- synthroid 112mcg   Depression- lexapro 20mg, not working as well as it once did, would like to try similar alternative     Labs recently completed, TSH slightly elevated, T4 low normal    Dr. Garcia @ [facility], has had recent visit     Last visit Jan 2022 with Dr. Cook.    Review of Systems   Constitutional: Negative for chills, diaphoresis and fever.   HENT: Negative for congestion, rhinorrhea and sore throat.    Respiratory: Negative for cough, shortness of breath and wheezing.    Cardiovascular: Negative for chest pain and leg swelling.   Gastrointestinal: Positive for constipation (appt with GI tomorrow), nausea (occasional) and vomiting (occasional). Negative for abdominal pain and diarrhea.   Skin: Negative for rash.   Neurological: Negative for dizziness, light-headedness and headaches.   Psychiatric/Behavioral: The patient is nervous/anxious (irritable, on lexapro 20mg).        Objective:   BP (!) 144/88   Pulse 92   Temp 97.1 °F (36.2 °C) (Tympanic)   Resp 18   Ht 5' 3" (1.6 m)   Wt 96 kg (211 lb 10.3 oz)   SpO2 96%   BMI 37.49 kg/m²   Physical Exam  Constitutional:       General: She is not in acute distress.     Appearance: Normal appearance. She is well-developed. She is not ill-appearing.   HENT:      Head: Normocephalic and atraumatic.   Cardiovascular:      Rate and Rhythm: Normal rate and regular rhythm.      Heart sounds: Normal heart sounds. No murmur heard.  Pulmonary:      Effort: Pulmonary effort is normal. No respiratory distress.      Breath " sounds: Normal breath sounds. No decreased breath sounds.   Musculoskeletal:      Right lower leg: No edema.      Left lower leg: No edema.   Skin:     General: Skin is warm and dry.      Findings: No rash.   Psychiatric:         Speech: Speech normal.         Behavior: Behavior normal.         Thought Content: Thought content normal.       Assessment:      1. Type II diabetes mellitus with peripheral circulatory disorder    2. Essential hypertension    3. Hyperlipidemia, unspecified hyperlipidemia type    4. Heart disease    5. Hypothyroidism, unspecified type    6. Dementia without behavioral disturbance, unspecified dementia type    7. Major depressive disorder, remission status unspecified, unspecified whether recurrent    8. Anxiety       Plan:   Type II diabetes mellitus with peripheral circulatory disorder  -     aspirin (ECOTRIN) 81 MG EC tablet; Take 1 tablet (81 mg total) by mouth once daily.  Dispense: 90 tablet; Refill: 1  -     glipiZIDE (GLUCOTROL) 10 MG tablet; Take 1 tablet (10 mg total) by mouth once daily.  Dispense: 90 tablet; Refill: 1  -     Ambulatory referral/consult to Diabetic Advanced Practice Providers (Medical Management); Future; Expected date: 05/03/2022  -     dulaglutide (TRULICITY) 3 mg/0.5 mL pen injector; Inject 3 mg into the skin every 7 days.  Dispense: 12 pen; Refill: 1  -     metFORMIN (GLUCOPHAGE-XR) 500 MG ER 24hr tablet; Take 2 tablets (1,000 mg total) by mouth 2 (two) times daily with meals.  Dispense: 360 tablet; Refill: 1    Essential hypertension  -     aspirin (ECOTRIN) 81 MG EC tablet; Take 1 tablet (81 mg total) by mouth once daily.  Dispense: 90 tablet; Refill: 1  -     irbesartan-hydrochlorothiazide (AVALIDE) 300-12.5 mg per tablet; TAKE ONE TABLET BY MOUTH EVERY DAY FOR HIGH BLOOD PRESSURE  Dispense: 90 tablet; Refill: 1    Hyperlipidemia, unspecified hyperlipidemia type  -     rosuvastatin (CRESTOR) 10 MG tablet; Take 1 tablet (10 mg total) by mouth nightly.   Dispense: 90 tablet; Refill: 1    Heart disease   Followed by external Cardiology     Hypothyroidism, unspecified type  -     levothyroxine (SYNTHROID) 125 MCG tablet; Take 1 tablet (125 mcg total) by mouth before breakfast.  Dispense: 30 tablet; Refill: 11  -     TSH; Future; Expected date: 04/26/2022  -     T4, Free; Future; Expected date: 04/26/2022    Dementia without behavioral disturbance, unspecified dementia type   On Namenda, consider Neuro referral in the future if not already being followed     Major depressive disorder, remission status unspecified, unspecified whether recurrent  -     Ambulatory referral/consult to Psychiatry; Future; Expected date: 05/03/2022  -     sertraline (ZOLOFT) 100 MG tablet; Take 1 tablet (100 mg total) by mouth once daily.  Dispense: 30 tablet; Refill: 11    Anxiety  -     Ambulatory referral/consult to Psychiatry; Future; Expected date: 05/03/2022  -     sertraline (ZOLOFT) 100 MG tablet; Take 1 tablet (100 mg total) by mouth once daily.  Dispense: 30 tablet; Refill: 11    Discussed need for shingles/tetanus at pharmacy     Limit sugar intake, monitor sugar daily   Stop janumet, start metformin bid and increase trulicity to 3mg  Will also refer to Diabetes Mgmt for better glycemic control     BP elevated, monitor at home 4wk f/u for recheck, bring cuff to next visit     Increase synthroid to 125mcg, repeat thyroid labs in 6wks     D/c lexapro, start zoloft 100mg, can also consider increase   Referral to Psych placed as well     Mammo and eye exam to be scheduled     3mth f/u with labs prior

## 2022-04-26 NOTE — TELEPHONE ENCOUNTER
Quick DC. Inappropriate Request    Refill Authorization Note   Krysta Manuel  is requesting a refill authorization.  Brief Assessment and Rationale for Refill:  Quick Discontinue  Medication Therapy Plan:  discontinued on 4/26/2022 by Liz Harrell PA-C LEXAPRO DID NOT WORK SWTICHED TO SERTRALINE    Medication Reconciliation Completed:  No      Comments:   Pended Medication(s)       Requested Prescriptions     Refused Prescriptions Disp Refills    EScitalopram oxalate (LEXAPRO) 20 MG tablet [Pharmacy Med Name: ESCITALOPRAM 20 MG TABLET 20 Tablet] 90 tablet 1     Sig: TAKE 1 TABLET (20 MG TOTAL) BY MOUTH ONCE DAILY.     Refused By: DOT MENDEZ     Reason for Refusal: Refill not appropriate        Duplicate Pended Encounter(s)/ Last Prescribed Details: (includes pharmacy & prescriber details)   [facility] [place] [address]   [address] [place] [address]   Phone:  [phone]  Fax:  [phone]   RUDY #:  --   BRAIN Reason: --       Outpatient Medication Detail     Disp Refills Start End BRAIN   sertraline (ZOLOFT) 100 MG tablet 30 tablet 11 4/26/2022 4/26/2023 --   Sig - Route: Take 1 tablet (100 mg total) by mouth once daily. - Oral   Sent to pharmacy as: sertraline (ZOLOFT) 100 MG tablet   Class: Normal   Order: [id number]   Date/Time Signed: 4/26/2022 10:10       E-Prescribing Status: Receipt confirmed by pharmacy (4/26/2022 10:10 AM CDT)       Ordering Encounter Report    Associated Reports   View Encounter                Note composed:2:40 PM 04/26/2022

## 2022-05-04 ENCOUNTER — PATIENT OUTREACH (OUTPATIENT)
Dept: ADMINISTRATIVE | Facility: OTHER | Age: 67
End: 2022-05-04
Payer: MEDICARE

## 2022-05-04 DIAGNOSIS — Z12.31 ENCOUNTER FOR SCREENING MAMMOGRAM FOR BREAST CANCER: Primary | ICD-10-CM

## 2022-05-05 ENCOUNTER — OFFICE VISIT (OUTPATIENT)
Dept: DIABETES | Facility: CLINIC | Age: 67
End: 2022-05-05
Payer: MEDICARE

## 2022-05-05 VITALS
SYSTOLIC BLOOD PRESSURE: 158 MMHG | DIASTOLIC BLOOD PRESSURE: 98 MMHG | HEART RATE: 92 BPM | BODY MASS INDEX: 37.49 KG/M2 | HEIGHT: 63 IN

## 2022-05-05 DIAGNOSIS — E78.5 HYPERLIPIDEMIA, UNSPECIFIED HYPERLIPIDEMIA TYPE: ICD-10-CM

## 2022-05-05 DIAGNOSIS — I69.354 HEMIPARESIS AFFECTING LEFT SIDE AS LATE EFFECT OF CEREBROVASCULAR ACCIDENT: ICD-10-CM

## 2022-05-05 DIAGNOSIS — I10 HYPERTENSION, UNSPECIFIED TYPE: ICD-10-CM

## 2022-05-05 DIAGNOSIS — E11.51 TYPE II DIABETES MELLITUS WITH PERIPHERAL CIRCULATORY DISORDER: Primary | ICD-10-CM

## 2022-05-05 DIAGNOSIS — Z86.73 HISTORY OF STROKE: ICD-10-CM

## 2022-05-05 PROCEDURE — 1159F PR MEDICATION LIST DOCUMENTED IN MEDICAL RECORD: ICD-10-PCS | Mod: CPTII,S$GLB,, | Performed by: NURSE PRACTITIONER

## 2022-05-05 PROCEDURE — 3077F SYST BP >= 140 MM HG: CPT | Mod: CPTII,S$GLB,, | Performed by: NURSE PRACTITIONER

## 2022-05-05 PROCEDURE — 1160F RVW MEDS BY RX/DR IN RCRD: CPT | Mod: CPTII,S$GLB,, | Performed by: NURSE PRACTITIONER

## 2022-05-05 PROCEDURE — 3061F NEG MICROALBUMINURIA REV: CPT | Mod: CPTII,S$GLB,, | Performed by: NURSE PRACTITIONER

## 2022-05-05 PROCEDURE — 1159F MED LIST DOCD IN RCRD: CPT | Mod: CPTII,S$GLB,, | Performed by: NURSE PRACTITIONER

## 2022-05-05 PROCEDURE — 99999 PR PBB SHADOW E&M-EST. PATIENT-LVL V: ICD-10-PCS | Mod: PBBFAC,,, | Performed by: NURSE PRACTITIONER

## 2022-05-05 PROCEDURE — 1101F PR PT FALLS ASSESS DOC 0-1 FALLS W/OUT INJ PAST YR: ICD-10-PCS | Mod: CPTII,S$GLB,, | Performed by: NURSE PRACTITIONER

## 2022-05-05 PROCEDURE — 3066F PR DOCUMENTATION OF TREATMENT FOR NEPHROPATHY: ICD-10-PCS | Mod: CPTII,S$GLB,, | Performed by: NURSE PRACTITIONER

## 2022-05-05 PROCEDURE — 3008F BODY MASS INDEX DOCD: CPT | Mod: CPTII,S$GLB,, | Performed by: NURSE PRACTITIONER

## 2022-05-05 PROCEDURE — 3008F PR BODY MASS INDEX (BMI) DOCUMENTED: ICD-10-PCS | Mod: CPTII,S$GLB,, | Performed by: NURSE PRACTITIONER

## 2022-05-05 PROCEDURE — 1160F PR REVIEW ALL MEDS BY PRESCRIBER/CLIN PHARMACIST DOCUMENTED: ICD-10-PCS | Mod: CPTII,S$GLB,, | Performed by: NURSE PRACTITIONER

## 2022-05-05 PROCEDURE — 4010F PR ACE/ARB THEARPY RXD/TAKEN: ICD-10-PCS | Mod: CPTII,S$GLB,, | Performed by: NURSE PRACTITIONER

## 2022-05-05 PROCEDURE — 4010F ACE/ARB THERAPY RXD/TAKEN: CPT | Mod: CPTII,S$GLB,, | Performed by: NURSE PRACTITIONER

## 2022-05-05 PROCEDURE — 3077F PR MOST RECENT SYSTOLIC BLOOD PRESSURE >= 140 MM HG: ICD-10-PCS | Mod: CPTII,S$GLB,, | Performed by: NURSE PRACTITIONER

## 2022-05-05 PROCEDURE — 1126F PR PAIN SEVERITY QUANTIFIED, NO PAIN PRESENT: ICD-10-PCS | Mod: CPTII,S$GLB,, | Performed by: NURSE PRACTITIONER

## 2022-05-05 PROCEDURE — 3288F FALL RISK ASSESSMENT DOCD: CPT | Mod: CPTII,S$GLB,, | Performed by: NURSE PRACTITIONER

## 2022-05-05 PROCEDURE — 3080F PR MOST RECENT DIASTOLIC BLOOD PRESSURE >= 90 MM HG: ICD-10-PCS | Mod: CPTII,S$GLB,, | Performed by: NURSE PRACTITIONER

## 2022-05-05 PROCEDURE — 99205 OFFICE O/P NEW HI 60 MIN: CPT | Mod: S$GLB,,, | Performed by: NURSE PRACTITIONER

## 2022-05-05 PROCEDURE — 3052F PR MOST RECENT HEMOGLOBIN A1C LEVEL 8.0 - < 9.0%: ICD-10-PCS | Mod: CPTII,S$GLB,, | Performed by: NURSE PRACTITIONER

## 2022-05-05 PROCEDURE — 99999 PR PBB SHADOW E&M-EST. PATIENT-LVL V: CPT | Mod: PBBFAC,,, | Performed by: NURSE PRACTITIONER

## 2022-05-05 PROCEDURE — 3288F PR FALLS RISK ASSESSMENT DOCUMENTED: ICD-10-PCS | Mod: CPTII,S$GLB,, | Performed by: NURSE PRACTITIONER

## 2022-05-05 PROCEDURE — 1101F PT FALLS ASSESS-DOCD LE1/YR: CPT | Mod: CPTII,S$GLB,, | Performed by: NURSE PRACTITIONER

## 2022-05-05 PROCEDURE — 3061F PR NEG MICROALBUMINURIA RESULT DOCUMENTED/REVIEW: ICD-10-PCS | Mod: CPTII,S$GLB,, | Performed by: NURSE PRACTITIONER

## 2022-05-05 PROCEDURE — 99205 PR OFFICE/OUTPT VISIT, NEW, LEVL V, 60-74 MIN: ICD-10-PCS | Mod: S$GLB,,, | Performed by: NURSE PRACTITIONER

## 2022-05-05 PROCEDURE — 3052F HG A1C>EQUAL 8.0%<EQUAL 9.0%: CPT | Mod: CPTII,S$GLB,, | Performed by: NURSE PRACTITIONER

## 2022-05-05 PROCEDURE — 3080F DIAST BP >= 90 MM HG: CPT | Mod: CPTII,S$GLB,, | Performed by: NURSE PRACTITIONER

## 2022-05-05 PROCEDURE — 3066F NEPHROPATHY DOC TX: CPT | Mod: CPTII,S$GLB,, | Performed by: NURSE PRACTITIONER

## 2022-05-05 PROCEDURE — 1126F AMNT PAIN NOTED NONE PRSNT: CPT | Mod: CPTII,S$GLB,, | Performed by: NURSE PRACTITIONER

## 2022-05-05 RX ORDER — GLIPIZIDE 10 MG/1
TABLET ORAL
Qty: 90 TABLET | Refills: 1
Start: 2022-05-05 | End: 2022-07-14 | Stop reason: SDUPTHER

## 2022-05-05 RX ORDER — INSULIN GLARGINE 100 [IU]/ML
56 INJECTION, SOLUTION SUBCUTANEOUS NIGHTLY
Qty: 30 ML | Refills: 3
Start: 2022-05-05 | End: 2022-07-14

## 2022-05-05 RX ORDER — GLIPIZIDE 10 MG/1
TABLET ORAL
Qty: 90 TABLET | Refills: 1 | Status: SHIPPED | OUTPATIENT
Start: 2022-05-05 | End: 2022-05-05

## 2022-05-05 NOTE — PROGRESS NOTES
PCP: Wanda Cook DO    Subjective:     Chief Complaint: Diabetes Consult    HPI: 66 y.o.  female for diabetes consult.   Type II diabetes for > 10 years.  Comorbidities: HTN, HLD, CAD, S/p CVA (2010), Hypothyroidism and Overweight by BMI   Accompanied by daughter during visit.    Family History of Type 1/2 DM: father  Known diabetes complications:  DKA/HHS: no  Retinopathy: no  Peripheral neuropathy: no  Nephropathy: no    Denies recent hospital admissions or ER visits.   Voices having hypoglycemia.   BG reading 60's at bedtime.  Daughter states pt sometimes eating light dinner at times.  Endorses diabetes related symptoms: None.    Blood glucose monitoring via fingerstick: 2 x/day   Per patient's recall, over the last 2 weeks   Fasting 's   AC lunch 170- 200's   AC dinner 260's     Activity: Sedentary  Diet:3 meals/day;1 snacks/day.    Social history:    - Single, Lives with daughter, grandchild    Medication compliance all of the time, diet compliance most of the time.   Daughter states eating high sugar candies lately.  To be enrolled in diabetes education classes.     Previous regimen: none    Past failed treatment: N/A    Current DM Medications:   - Trulicity 3 mg weekly: recently increased, taken 2 doses   - Glipizide 10 mg ac breakfast   - Humalog 75/25 30 units qam: not taking   - Lantus 50 units qd: taking 50 units if ,  taking 60 units if  or >    - Metformin 500 mg 2 tablets bid with meals    Allergies  Review of patient's allergies indicates:  No Known Allergies    Labs Reviewed:  Her most recent A1C is:  Lab Results   Component Value Date    HGBA1C 8.2 (H) 04/19/2022    HGBA1C 7.8 12/29/2021    HGBA1C 8.7 09/28/2021       Her most recent BMP is:  Lab Results   Component Value Date     04/19/2022    K 4.5 04/19/2022     04/19/2022    CO2 27 04/19/2022    BUN 20 04/19/2022    CREATININE 1.0 04/19/2022    CALCIUM 10.5 04/19/2022    ANIONGAP 11 04/19/2022     ESTGFRAFRICA >60.0 04/19/2022    EGFRNONAA 58.4 (A) 04/19/2022       No results found for: CPEPTIDE  No results found for: GLUTAMICACID    There is no height or weight on file to calculate BMI.    Wt Readings from Last 3 Encounters:   04/26/22 0934 96 kg (211 lb 10.3 oz)   03/31/22 1420 93.9 kg (207 lb 0.2 oz)   01/31/22 0935 93.9 kg (207 lb 0.2 oz)        Her blood sugar in clinic today is: 189  Lab Results   Component Value Date    POCGLU 214 (A) 12/29/2021       Diabetes Management Status  Statin: Taking  ACE/ARB: Taking    Screening or Prevention Patient's value Goal Complete/Controlled?   HgA1C Testing and Control   Lab Results   Component Value Date    HGBA1C 8.2 (H) 04/19/2022      Annually/Less than 8% No   Lipid profile : 04/19/2022 Annually Yes   LDL control Lab Results   Component Value Date    LDLCALC 37.4 (L) 04/19/2022    Annually/Less than 100 mg/dl  Yes   Nephropathy screening Lab Results   Component Value Date    MICALBCREAT 4.6 02/03/2022     No results found for: PROTEINUA Annually Yes   Blood pressure BP Readings from Last 1 Encounters:   04/26/22 (!) 144/88    Less than 140/90 No   Dilated retinal exam Most Recent Eye Exam Date: Not Found Annually Yes    Foot exam   : 10/11/2021 Annually Yes     Social History     Socioeconomic History    Marital status: Single   Tobacco Use    Smoking status: Never Smoker    Smokeless tobacco: Never Used     Past Medical History:   Diagnosis Date    Arthritis     Depression     DM II (diabetes mellitus, type II), controlled     Heart disease     HTN (hypertension)     Hyperlipidemia     Hypothyroidism     Insomnia     Stroke 2010    Vitamin D deficiency      Review of Systems   Constitutional: Negative for activity change, appetite change, fatigue and unexpected weight change.   HENT: Negative for dental problem and trouble swallowing.    Eyes: Negative for visual disturbance.   Respiratory: Negative for chest tightness and shortness of breath.     Cardiovascular: Negative for chest pain, palpitations and leg swelling.   Gastrointestinal: Negative for abdominal pain, constipation, diarrhea, nausea and vomiting.   Endocrine: Negative for polydipsia, polyphagia and polyuria.   Genitourinary: Negative for difficulty urinating, dysuria, frequency and urgency.        Negative yeast infection   Musculoskeletal: Positive for gait problem (r/t left sided weakness, ambulates with quad cane). Negative for myalgias and neck pain.   Integumentary:  Negative for rash and wound.   Allergic/Immunologic: Negative.    Neurological: Positive for weakness (residual left sided secondary to CVA). Negative for dizziness, syncope, numbness and headaches.   Hematological: Negative.    Psychiatric/Behavioral: Negative for confusion and sleep disturbance.   All other systems reviewed and are negative.       Objective:      Physical Exam  Vitals reviewed.   Constitutional:       Appearance: Normal appearance.   HENT:      Head: Normocephalic and atraumatic.   Eyes:      General: Vision grossly intact.      Extraocular Movements: Extraocular movements intact.      Pupils: Pupils are equal, round, and reactive to light.   Neck:      Thyroid: No thyroid mass or thyroid tenderness.   Cardiovascular:      Rate and Rhythm: Normal rate and regular rhythm.      Pulses: Normal pulses.           Radial pulses are 2+ on the right side and 2+ on the left side.        Dorsalis pedis pulses are 2+ on the right side and 2+ on the left side.      Heart sounds: Normal heart sounds.   Pulmonary:      Effort: Pulmonary effort is normal.      Breath sounds: Normal breath sounds.   Abdominal:      General: Bowel sounds are normal.      Palpations: Abdomen is soft.   Musculoskeletal:         General: Normal range of motion.      Cervical back: Normal range of motion and neck supple.      Right lower leg: No edema.      Left lower leg: No edema.      Right foot: No deformity or bunion.      Left foot: No  deformity or bunion.   Feet:      Right foot:      Protective Sensation: 6 sites tested. 6 sites sensed.      Skin integrity: Skin integrity normal. No ulcer, skin breakdown, callus or dry skin.      Toenail Condition: Right toenails are abnormally thick.      Left foot:      Protective Sensation: 6 sites tested. 6 sites sensed.      Skin integrity: Skin integrity normal. No ulcer, skin breakdown, callus or dry skin.      Toenail Condition: Left toenails are abnormally thick.      Comments: Pinprick exam completed with 10-g monofilament. Vibration sensation intact.  Lymphadenopathy:      Cervical: No cervical adenopathy.   Skin:     General: Skin is warm and dry.      Findings: No rash or wound.      Comments: Negative for acanthosis nigricans. Well-healed SQ injection sites.   Neurological:      Mental Status: She is alert and oriented to person, place, and time.      Coordination: Coordination is intact.      Gait: Gait is intact.   Psychiatric:         Attention and Perception: Attention normal.         Mood and Affect: Mood normal.         Speech: Speech normal.         Behavior: Behavior normal. Behavior is cooperative.         Thought Content: Thought content normal.         Cognition and Memory: Cognition normal.         Assessment / Plan:     Krysta was seen today for diabetes mellitus.    Diagnoses and all orders for this visit:    Type II diabetes mellitus with peripheral circulatory disorder  -     Ambulatory referral/consult to Diabetes Education; Future  -     Discontinue: glipiZIDE (GLUCOTROL) 10 MG tablet; Take 1/2 tablet before breakfast and 1/2 tablet before dinner.  -     glipiZIDE (GLUCOTROL) 10 MG tablet; Take 1/2 tablet before breakfast and 1/2 tablet before dinner.  -     LANTUS SOLOSTAR U-100 INSULIN glargine 100 units/mL (3mL) SubQ pen; Inject 56 Units into the skin every evening.  -     POCT Glucose, Hand-Held Device    History of stroke    Hemiparesis affecting left side as late effect of  cerebrovascular accident    Hypertension, unspecified type    Hyperlipidemia, unspecified hyperlipidemia type    BMI 37.0-37.9, adult    Additional Plan Details:  - Condition: uncontrolled, most recent A1C of 8.2% was completed 3 weeks ago.   - Monitor blood glucose:  4x daily.Goals reviewed. Maintain BG log. Bring to next visit for review.  - Hypoglycemia protocol: Reviewed and risk discussed.  Notify if BG readings below 80. Protocol printout provided.   - Diet: Limit carbohydrate intake 30-45 grams/meal, 3x daily and 15 grams/snack, limit 2/day.   - Activity: Recommend at least 150 minutes of exercise in a week.  - BP and LDL: Recommend lifestyle modifications and follow up with PCP for management.   - Medication Changes:    - Continue Trulicity 3 mg weekly   - Change Glipizide to 1/2 tablet (5 mg) before breakfast and 1/2 tablet (5 mg) before dinner    - Stop Humalog 75/25   - Increase Lantus 56 units in the evening. If fasting blood glucose is greater than 130, increase to 62 units   - Continue Metformin 2 tablets bid with meals    - Medication consideration: Titrate Trulicity/basal to goal BG. May also benefit from SGLT 2 inhibitor.  - Lab results: A1C discussed.  - Health Maintenance standards addressed today: Foot Exam - completed today and documented in physical exam with feedback to patient about proper diabetic foot care and findings, Eye Exam - will be completed outside of [facility] and patient will schedule, Diabetic Care Specialist appointment to be scheduled today and A1c to be scheduled.   - Risks of uncontrolled DM explained. Importance of routine foot and eye exams reviewed. Scheduled as appropriate.  -The patient was explained the above plan and given opportunity to ask questions.  She understands, chooses and consents to this plan and accepts all the risks, which include but are not limited to the risks mentioned above.   - Labs ordered as above.  - CDE referral: Scheduled  - Follow up: 1 month to  monitor BG readings and determine insulin dose adjustment       Zenaida Shetty, FNP-C [facility]    A total of 60 minutes was spent in face to face time, of which over 50 % was spent in counseling patient on disease process, complications, treatment, and side effects of medications.

## 2022-05-05 NOTE — PATIENT INSTRUCTIONS
Medication Regimen/Changes:   - Continue Trulicity 3 mg weekly  - Change Glipizide to 1/2 tablet (5 mg) before breakfast and 1/2 tablet (5 mg) before dinner   - Stop Humalog 75/25  - Increase Lantus 56 units in the evening. If fasting blood glucose is greater than 130, increase to 62 units  - Continue Metformin 2 tablets twice daily with meals    Patient Instructions:  Carbohydrate Count: 30-45 grams/meal and 15 grams/snack with limit of 2 snacks per day.  Exercise: Goal is 150 minutes or more per week.  Bring meter and blood sugar log to each appointment.     Goals for Blood Sugar:  Fastin-130 mg/dl  2 hours after meal:  mg/dl  Before Bed: 100-150 mg/dl  If Blood sugar is below 70 mg/dl, DO NOT take diabetes medication. Eat first and then recheck blood sugar in 20 minutes.  Foods to eat if blood sugar is below 70 mg/dl  1/2 glass of orange juice   1/2 regular cola can   3-4 hard candies   3-4 small glucose tabs.   Foods to eat if blood sugar is below 50 mg/dl  1 glass of orange juice  1 regular cola can  8 hard candies  8 small glucose tabs.   If you have repeated eating/blood sugar recheck process 2 times and blood sugar still below 70 mg/dl, call 911.

## 2022-05-19 ENCOUNTER — TELEPHONE (OUTPATIENT)
Dept: DIABETES | Facility: CLINIC | Age: 67
End: 2022-05-19
Payer: MEDICARE

## 2022-05-19 NOTE — TELEPHONE ENCOUNTER
Spoke to pt's daughter and r/s appt to next week with Frieda Hammond     ----- Message from Frieda Hammond RD, CDE sent at 5/19/2022 12:26 PM CDT -----  Contact: [phone]    ----- Message -----  From: Fern Jaquez  Sent: 5/19/2022   9:59 AM CDT  To: , #    Patient called, would like to rescheduled her appointment from today 05/19/22. Please call and advise. Thank you

## 2022-05-26 ENCOUNTER — CLINICAL SUPPORT (OUTPATIENT)
Dept: DIABETES | Facility: CLINIC | Age: 67
End: 2022-05-26
Payer: MEDICARE

## 2022-05-26 VITALS — BODY MASS INDEX: 36.98 KG/M2 | WEIGHT: 208.75 LBS

## 2022-05-26 DIAGNOSIS — E11.51 TYPE II DIABETES MELLITUS WITH PERIPHERAL CIRCULATORY DISORDER: ICD-10-CM

## 2022-05-26 PROCEDURE — G0108 DIAB MANAGE TRN  PER INDIV: HCPCS | Mod: S$GLB,,, | Performed by: DIETITIAN, REGISTERED

## 2022-05-26 PROCEDURE — 99999 PR PBB SHADOW E&M-EST. PATIENT-LVL III: ICD-10-PCS | Mod: PBBFAC,,, | Performed by: DIETITIAN, REGISTERED

## 2022-05-26 PROCEDURE — G0108 PR DIAB MANAGE TRN  PER INDIV: ICD-10-PCS | Mod: S$GLB,,, | Performed by: DIETITIAN, REGISTERED

## 2022-05-26 PROCEDURE — 99999 PR PBB SHADOW E&M-EST. PATIENT-LVL III: CPT | Mod: PBBFAC,,, | Performed by: DIETITIAN, REGISTERED

## 2022-05-26 NOTE — PROGRESS NOTES
Diabetes Care Specialist Progress Note  Author: Frieda Hamomnd RD, CDE  Date: 5/26/2022    Program Intake  Reason for Diabetes Program Visit:: Initial Diabetes Assessment  Type of Intervention:: Individual  Education: Self-Management Skill Review  Current diabetes risk level:: moderate  In the last 12 months, have you:: used emergency room services  Was the ER or hospital admission related to diabetes?: No    Lab Results   Component Value Date    LABA1C 14.4 01/06/2019    HGBA1C 8.2 (H) 04/19/2022       Clinical    Patient Health Rating  Compared to other people your age, how would you rate your health?: Fair    Problem Review  Active comorbidities affecting diabetes self-care.: yes (HTN, HLD, CAD, CVA, Dementia, Depression, VitD defn, Hypothyroidism, Obesity by BMI)    Clinical Assessment  Current Diabetes Treatment: Diet, Exercise, Oral Medication, Injectable, Insulin (Trulicity 3mg weekly. Lantus 56units daily. Metformin XR 500mg 2tab twice daily. Glipizide ac 10mg 1/2 tab bfst & 1/2 tab dinner.)  Have you ever experienced hypoglycemia (low blood sugar)?: yes (70s)  In the last month, how often have you experienced low blood sugar?:  (rare)  Are you able to tell when your blood sugar is low?: Yes  What symptoms do you experience?: Shaky, Sweaty (weak)  Have you ever been hospitalized because your blood sugar was too low?: no  How do you treat hypoglycemia (low blood sugar)?:  (any food)  Have you ever experienced hyperglycemia (high blood sugar)?: yes (200s)  In the last month, how often have you experienced high blood sugar?: once a week  Are you able to tell when your blood sugar is high?: No (comment)  Have you ever been hospitalized because your blood sugar was high?: no    Medication Information  How many days a week do you miss your medications?: Never  Do you sometimes have difficulty refilling your medications?: No  Medication adherence impacting ability to self-manage diabetes?: No    Labs  Do you  have regular lab work to monitor your medications?: Yes  Type of Regular Lab Work: A1c, Cholesterol, Microalbumin, CBC, BMP  Where do you get your labs drawn?: [facility]  Lab Compliance Barriers: No    Nutritional Status  Diet:  (Pt reports 3 small meals daily. Carb intake appears controlled except at meals with beans/rice. Higher sat fat, sodium from snacks, processed foods. Inadequate non-starchy vegetables but likes variety.)  Meal Plan 24 Hour Recall - Breakfast: cereal (special k) 1.5c, 1%milk  Meal Plan 24 Hour Recall - Lunch: 2 small meatballs, couple bites of pasta OR red beans, rice 2cups  Meal Plan 24 Hour Recall - Dinner: baked frozen type chix etienne, 2onion rings, 2 tots  Meal Plan 24 Hour Recall - Snack: chips, cheese cracker; lay: water w/ sf  Change in appetite?: Yes (decreased - pt reports from fatigue)  Dentation:: Needs Dentures (reports difficulty chewing meats and planning to set up appt for dentures)  Recent Changes in Weight: Weight Loss (~3lbs since 4/26/22)  Was weight loss intentional or unintentional?: Unintentional  Current nutritional status an area of need that is impacting patient's ability to self-manage diabetes?: No    Additional Social History    Support  Does anyone support you with your diabetes care?: yes  Who supports you?: son/daughter  Is Support an area impacting ability to self-manage diabetes?: No    Access to Mass Media & Technology  Does the patient have access to any of the following devices or technologies?: Smart phone, Internet Access  Media or technology needs impacting ability to self-manage diabetes?: No    Cognitive/Behavioral Health  Alert and Oriented: Yes  Difficulty Thinking: No  Requires Prompting: No  Requires assistance for routine expression?: No  Cognitive or behavioral barriers impacting ability to self-manage diabetes?: No    Culture/Alevism  Culture or Adventist beliefs that may impact ability to access healthcare: No    Communication  Language  preference: English  Hearing Problems: No  Vision Problems: Yes  Vision problem type:: Decreased Vision  Vision Assistance:  (needs glasses and planning to aidan eye appt)  Communication needs impacting ability to self-manage diabetes?: Yes    Health Literacy  Preferred Learning Method: Face to Face, Demonstration, Hands On  How often do you need to have someone help you read instructions, pamphlets, or written material from your doctor or pharmacy?: Never  Health literacy needs impacting ability to self-manage diabetes?: No      Diabetes Self-Management Skills Assessment    Diabetes Disease Process/Treatment Options  Diabetes Disease Process/Treatment Options: Skills Assessment Completed: No  Deferred due to:: Time    Nutrition/Healthy Eating  Challenges to healthy eating::  (snacks, portions at bean/rice meals, decreased appetite)  Method of carbohydrate measurement::  (limits sugary foods/lay; otherwise, no method)  Patient can identify foods that impact blood sugar.: no (see comments) (daughter and pt were not able to identify foods besides sugar)  Patient-identified foods:: sweets, soda  Nutrition/Healthy Eating Skills Assessment Completed:: Yes  Assessment indicates:: Instruction Needed, Knowledge deficit  Area of need?: Yes    Physical Activity/Exercise  Patient's daily activity level:: sedentary  Patient formally exercises outside of work.: no  Reasons for not exercising::  (pt reports feeling tired)  Patient can identify forms of physical activity.: no  Patient can identify reasons why exercise/physical activity is important in diabetes management.: no  Physical Activity/Exercise Skills Assessment Completed: : Yes  Assessment indicates:: Knowledge deficit, Instruction Needed  Area of need?: Yes    Medications  Patient is able to describe current diabetes management routine.: yes  Patient is able to identify current diabetes medications, dosages, and appropriate timing of medications.: yes  Patient understands  "the purpose of the medications taken for diabetes.: yes  Patient reports problems or concerns with current medication regimen.: no  Medication Skills Assessment Completed:: Yes  Assessment indicates:: Adequate understanding (daughter assists in medication dosing)  Area of need?: No    Home Blood Glucose Monitoring  Patient states that blood sugar is checked at home daily.: yes  Monitoring Method:: home glucometer  How often do you check your blood sugar?: 3 times a day  When you check what is your typical blood sugar range? : per records, [facility]/ BG 91, 129-169, few 180s, one 244 when pt celebrated her bday  Blood glucose logs:: yes, encouraged to bring logs to provider visits  Blood glucose logs reviewed today?: yes  Home Blood Glucose Monitoring Skills Assessment Completed: : Yes  Assessment indicates:: Adequate understanding  Area of need?: No    Acute Complications  Patient is able to identify types of acute complications: Yes  Patient Identified:: Hypoglycemia, Hyperglycemia  Patient is able to state the basic meaning of hypoglycemia?: Yes  Able to state the blood sugar range for hypoglycemia?: yes  Patient stated range:: 70s  Patient can identify general symptoms of hypoglycemia: yes  Patient identified:: shakiness, sweating, fatigue  Able to state proper treatment of hypoglycemia?: no (see comments) (pt states "food")  Patient is able to state the basic meaning of hyperglycemia?: Yes  Able to state the blood sugar range for hyperglycemia?: yes  Patient stated range:: 200s  Patient able to state proper treatment of hyperglycemia?: no (see comments)  Acute Complications Skills Assessment Completed: : Yes  Assessment indicates:: Instruction Needed  Area of need?: Yes    Chronic Complications  Chronic Complications Skills Assessment Completed: : No  Deferred due to:: Time    Psychosocial/Coping  Patient can identify ways of coping with chronic disease.: no (see comments)  Psychosocial/Coping Skills Assessment " Completed: : Yes  Assessment indicates:: Instruction Needed  Area of need?: Yes    Assessment Summary and Plan  Based on today's diabetes care assessment, the following areas of need were identified:      Social 5/26/2022   Support No   Access to Mass Media/Tech No   Cognitive/Behavioral Health No   Culture/Moravian No   Communication Yes   Health Literacy No        Clinical 5/26/2022   Medication Adherence No   Lab Compliance No   Nutritional Status No        Diabetes Self-Management Skills 5/26/2022   Nutrition/Healthy Eating Yes - see care plan   Physical Activity/Exercise Yes - Pt agrees to consider discussing PT with Dr. Cook to help improve her strength, balance. Pt deferred discussing further benefits exercise today.   Medication No   Home Blood Glucose Monitoring No   Acute Complications Yes  Discussed prevention, identification signs/symptoms and treatment of hyperglycemia and hypoglycemia and when to contact clinic.   Psychosocial/Coping Yes  Discussed role of stress on diabetes management. Reviewed stress management techniques and healthy coping strategies. Encouraged appt scheduled w/ Dr Post. Encouraged dtr to notify Dr. Cook on updates since pt reports Zoloft not assisting depression symptoms.        Today's interventions were provided through individual discussion, instruction, and written materials were provided.    Patient verbalized understanding of instruction and written materials.  Pt was able to return back demonstration of instructions today. Patient understood key points, needs reinforcement and further instruction.     Diabetes Self-Management Care Plan:  Today's Diabetes Self-Management Care Plan was developed with Krysta's input. Krysta has agreed to work toward the following goal(s) to improve his/her overall diabetes control.      Care Plan: Diabetes Management   Updates made since 4/26/2022 12:00 AM      Problem: Healthy Eating       Goal: Eat 3 meals/d - manage carb 45grams/meal,  5-15grams/snack.    Start Date: 5/26/2022   Expected End Date: 5/26/2023   Priority: High   Barriers: No Barriers Identified      Task: Reviewed the sources and role of Carbohydrate, Protein, and Fat and how each nutrient impacts blood sugar. Completed 5/26/2022      Task: Provided visual examples using dry measuring cups, food models, and other familiar objects such as computer mouse, deck or cards, tennis ball etc. to help with visualization of portions. Completed 5/26/2022      Task: Review the importance of balancing carbohydrates with each meal using portion control techniques to count servings of carbohydrate and label reading to identify serving size and amount of total carbs per serving. Completed 5/26/2022      Task: Provided Sample plate method and reviewed the use of the plate to estimate amounts of carbohydrate per meal. Completed 5/26/2022          Follow Up Plan   Follow up in about 5 months (around 10/26/2022).   -Review BG logs  -Eval goal progress  -Admin diabetes distress scale    Today's care plan and follow up schedule was discussed with patient.  Krysta verbalized understanding of the care plan, goals, and agrees to follow up plan.        The patient was encouraged to communicate with his/her health care provider/physician and care team regarding his/her condition(s) and treatment.  I provided the patient with my contact information today and encouraged to contact me via phone or [facility] Patient Portal as needed.     Length of Visit   Total Time: 60 Minutes

## 2022-06-01 NOTE — TELEPHONE ENCOUNTER
----- Message from Tiesha Domínguez sent at 6/1/2022  4:30 PM CDT -----  Pt's daughter (Linnette) called stating her mother is out of test strips. The pt would like some called in to the pharmacy. Call back number is .[phone]. Thx. [facility] [place] [address]  [address] [address]  Phone: [phone] Fax: [phone]

## 2022-06-06 ENCOUNTER — OFFICE VISIT (OUTPATIENT)
Dept: INTERNAL MEDICINE | Facility: CLINIC | Age: 67
End: 2022-06-06
Payer: MEDICARE

## 2022-06-06 ENCOUNTER — HOSPITAL ENCOUNTER (OUTPATIENT)
Dept: RADIOLOGY | Facility: HOSPITAL | Age: 67
Discharge: HOME | End: 2022-06-06
Attending: INTERNAL MEDICINE
Payer: MEDICARE

## 2022-06-06 VITALS
RESPIRATION RATE: 18 BRPM | OXYGEN SATURATION: 97 % | BODY MASS INDEX: 36.45 KG/M2 | DIASTOLIC BLOOD PRESSURE: 98 MMHG | HEART RATE: 95 BPM | TEMPERATURE: 98 F | HEIGHT: 63 IN | WEIGHT: 205.69 LBS | SYSTOLIC BLOOD PRESSURE: 160 MMHG

## 2022-06-06 DIAGNOSIS — I51.9 HEART DISEASE: ICD-10-CM

## 2022-06-06 DIAGNOSIS — Z12.31 ENCOUNTER FOR SCREENING MAMMOGRAM FOR BREAST CANCER: ICD-10-CM

## 2022-06-06 DIAGNOSIS — I10 HYPERTENSION, UNSPECIFIED TYPE: Primary | ICD-10-CM

## 2022-06-06 PROCEDURE — 3061F NEG MICROALBUMINURIA REV: CPT | Mod: CPTII,S$GLB,, | Performed by: PHYSICIAN ASSISTANT

## 2022-06-06 PROCEDURE — 3061F PR NEG MICROALBUMINURIA RESULT DOCUMENTED/REVIEW: ICD-10-PCS | Mod: CPTII,S$GLB,, | Performed by: PHYSICIAN ASSISTANT

## 2022-06-06 PROCEDURE — 77063 BREAST TOMOSYNTHESIS BI: CPT | Mod: 26,,, | Performed by: RADIOLOGY

## 2022-06-06 PROCEDURE — 4010F ACE/ARB THERAPY RXD/TAKEN: CPT | Mod: CPTII,S$GLB,, | Performed by: PHYSICIAN ASSISTANT

## 2022-06-06 PROCEDURE — 77063 MAMMO DIGITAL SCREENING BILAT WITH TOMO: ICD-10-PCS | Mod: 26,,, | Performed by: RADIOLOGY

## 2022-06-06 PROCEDURE — 1160F RVW MEDS BY RX/DR IN RCRD: CPT | Mod: CPTII,S$GLB,, | Performed by: PHYSICIAN ASSISTANT

## 2022-06-06 PROCEDURE — 3066F PR DOCUMENTATION OF TREATMENT FOR NEPHROPATHY: ICD-10-PCS | Mod: CPTII,S$GLB,, | Performed by: PHYSICIAN ASSISTANT

## 2022-06-06 PROCEDURE — 99999 PR PBB SHADOW E&M-EST. PATIENT-LVL V: ICD-10-PCS | Mod: PBBFAC,,, | Performed by: PHYSICIAN ASSISTANT

## 2022-06-06 PROCEDURE — 3288F PR FALLS RISK ASSESSMENT DOCUMENTED: ICD-10-PCS | Mod: CPTII,S$GLB,, | Performed by: PHYSICIAN ASSISTANT

## 2022-06-06 PROCEDURE — 1101F PR PT FALLS ASSESS DOC 0-1 FALLS W/OUT INJ PAST YR: ICD-10-PCS | Mod: CPTII,S$GLB,, | Performed by: PHYSICIAN ASSISTANT

## 2022-06-06 PROCEDURE — 3052F PR MOST RECENT HEMOGLOBIN A1C LEVEL 8.0 - < 9.0%: ICD-10-PCS | Mod: CPTII,S$GLB,, | Performed by: PHYSICIAN ASSISTANT

## 2022-06-06 PROCEDURE — 3008F PR BODY MASS INDEX (BMI) DOCUMENTED: ICD-10-PCS | Mod: CPTII,S$GLB,, | Performed by: PHYSICIAN ASSISTANT

## 2022-06-06 PROCEDURE — 1159F MED LIST DOCD IN RCRD: CPT | Mod: CPTII,S$GLB,, | Performed by: PHYSICIAN ASSISTANT

## 2022-06-06 PROCEDURE — 77067 SCR MAMMO BI INCL CAD: CPT | Mod: 26,,, | Performed by: RADIOLOGY

## 2022-06-06 PROCEDURE — 77067 MAMMO DIGITAL SCREENING BILAT WITH TOMO: ICD-10-PCS | Mod: 26,,, | Performed by: RADIOLOGY

## 2022-06-06 PROCEDURE — 3077F SYST BP >= 140 MM HG: CPT | Mod: CPTII,S$GLB,, | Performed by: PHYSICIAN ASSISTANT

## 2022-06-06 PROCEDURE — 77063 BREAST TOMOSYNTHESIS BI: CPT | Mod: TC

## 2022-06-06 PROCEDURE — 3080F DIAST BP >= 90 MM HG: CPT | Mod: CPTII,S$GLB,, | Performed by: PHYSICIAN ASSISTANT

## 2022-06-06 PROCEDURE — 3077F PR MOST RECENT SYSTOLIC BLOOD PRESSURE >= 140 MM HG: ICD-10-PCS | Mod: CPTII,S$GLB,, | Performed by: PHYSICIAN ASSISTANT

## 2022-06-06 PROCEDURE — 3008F BODY MASS INDEX DOCD: CPT | Mod: CPTII,S$GLB,, | Performed by: PHYSICIAN ASSISTANT

## 2022-06-06 PROCEDURE — 77067 SCR MAMMO BI INCL CAD: CPT | Mod: TC

## 2022-06-06 PROCEDURE — 99999 PR PBB SHADOW E&M-EST. PATIENT-LVL V: CPT | Mod: PBBFAC,,, | Performed by: PHYSICIAN ASSISTANT

## 2022-06-06 PROCEDURE — 3052F HG A1C>EQUAL 8.0%<EQUAL 9.0%: CPT | Mod: CPTII,S$GLB,, | Performed by: PHYSICIAN ASSISTANT

## 2022-06-06 PROCEDURE — 1126F PR PAIN SEVERITY QUANTIFIED, NO PAIN PRESENT: ICD-10-PCS | Mod: CPTII,S$GLB,, | Performed by: PHYSICIAN ASSISTANT

## 2022-06-06 PROCEDURE — 99214 PR OFFICE/OUTPT VISIT, EST, LEVL IV, 30-39 MIN: ICD-10-PCS | Mod: S$GLB,,, | Performed by: PHYSICIAN ASSISTANT

## 2022-06-06 PROCEDURE — 3066F NEPHROPATHY DOC TX: CPT | Mod: CPTII,S$GLB,, | Performed by: PHYSICIAN ASSISTANT

## 2022-06-06 PROCEDURE — 4010F PR ACE/ARB THEARPY RXD/TAKEN: ICD-10-PCS | Mod: CPTII,S$GLB,, | Performed by: PHYSICIAN ASSISTANT

## 2022-06-06 PROCEDURE — 99214 OFFICE O/P EST MOD 30 MIN: CPT | Mod: S$GLB,,, | Performed by: PHYSICIAN ASSISTANT

## 2022-06-06 PROCEDURE — 1126F AMNT PAIN NOTED NONE PRSNT: CPT | Mod: CPTII,S$GLB,, | Performed by: PHYSICIAN ASSISTANT

## 2022-06-06 PROCEDURE — 3080F PR MOST RECENT DIASTOLIC BLOOD PRESSURE >= 90 MM HG: ICD-10-PCS | Mod: CPTII,S$GLB,, | Performed by: PHYSICIAN ASSISTANT

## 2022-06-06 PROCEDURE — 1159F PR MEDICATION LIST DOCUMENTED IN MEDICAL RECORD: ICD-10-PCS | Mod: CPTII,S$GLB,, | Performed by: PHYSICIAN ASSISTANT

## 2022-06-06 PROCEDURE — 1101F PT FALLS ASSESS-DOCD LE1/YR: CPT | Mod: CPTII,S$GLB,, | Performed by: PHYSICIAN ASSISTANT

## 2022-06-06 PROCEDURE — 3288F FALL RISK ASSESSMENT DOCD: CPT | Mod: CPTII,S$GLB,, | Performed by: PHYSICIAN ASSISTANT

## 2022-06-06 PROCEDURE — 1160F PR REVIEW ALL MEDS BY PRESCRIBER/CLIN PHARMACIST DOCUMENTED: ICD-10-PCS | Mod: CPTII,S$GLB,, | Performed by: PHYSICIAN ASSISTANT

## 2022-06-06 RX ORDER — HYDROCHLOROTHIAZIDE 25 MG/1
25 TABLET ORAL DAILY
Qty: 30 TABLET | Refills: 3 | Status: SHIPPED | OUTPATIENT
Start: 2022-06-06 | End: 2022-10-06 | Stop reason: SDUPTHER

## 2022-06-06 RX ORDER — IRBESARTAN 300 MG/1
300 TABLET ORAL NIGHTLY
Qty: 30 TABLET | Refills: 3 | Status: SHIPPED | OUTPATIENT
Start: 2022-06-06 | End: 2022-10-06 | Stop reason: SDUPTHER

## 2022-06-06 NOTE — PROGRESS NOTES
"Subjective:      Patient ID: Krysta Manuel is a 67 y.o. female.    Chief Complaint: 4 wk bp check    Patient is known to me, being seen today for BP f/u.     HTN- coreg 25mg bid, irbesartan-HCTZ 300-12.5mg, diltiazem 240mg  BP typically varies at home, high at doctor   BP cuff calibrated, accurate    External Cardiologist, appt coming up within a few weeks    Last visit April 2022, BP elevated at that time.     Review of Systems   Constitutional: Negative for chills, diaphoresis and fever.   HENT: Negative for congestion, rhinorrhea and sore throat.    Eyes: Negative for visual disturbance.   Respiratory: Negative for cough, shortness of breath and wheezing.    Cardiovascular: Positive for leg swelling (L leg, chronic, no change). Negative for chest pain.   Gastrointestinal: Negative for abdominal pain, constipation, diarrhea, nausea and vomiting.   Skin: Negative for rash.   Neurological: Negative for dizziness, light-headedness and headaches.       Objective:   BP (!) 160/98 (BP Location: Left arm, Patient Position: Sitting, BP Method: Large (Manual))   Pulse 95   Temp 98 °F (36.7 °C) (Tympanic)   Resp 18   Ht 5' 3" (1.6 m)   Wt 93.3 kg (205 lb 11 oz)   SpO2 97%   BMI 36.44 kg/m²   Physical Exam  Constitutional:       General: She is not in acute distress.     Appearance: Normal appearance. She is well-developed. She is not ill-appearing.   HENT:      Head: Normocephalic and atraumatic.   Cardiovascular:      Rate and Rhythm: Normal rate and regular rhythm.      Heart sounds: Normal heart sounds. No murmur heard.  Pulmonary:      Effort: Pulmonary effort is normal. No respiratory distress.      Breath sounds: Normal breath sounds. No decreased breath sounds.   Musculoskeletal:      Right lower leg: No edema.      Left lower leg: No edema.      Left ankle: Swelling present.   Skin:     General: Skin is warm and dry.      Findings: No rash.   Psychiatric:         Speech: Speech normal.         Behavior: " Behavior normal.         Thought Content: Thought content normal.       Assessment:      1. Hypertension, unspecified type    2. Heart disease       Plan:   Hypertension, unspecified type  -     irbesartan (AVAPRO) 300 MG tablet; Take 1 tablet (300 mg total) by mouth every evening.  Dispense: 30 tablet; Refill: 3  -     hydroCHLOROthiazide (HYDRODIURIL) 25 MG tablet; Take 1 tablet (25 mg total) by mouth once daily.  Dispense: 30 tablet; Refill: 3    Heart disease      Split Avalid, increase HCTZ to 25mg   Continue other medications as prescribed  Monitor BP at home   1mth f/u     Keep upcoming visit with Card    Discussed worsening signs/symptoms and when to return to clinic or go to ED.   Patient expresses understanding and agrees with treatment plan.

## 2022-06-28 ENCOUNTER — TELEPHONE (OUTPATIENT)
Dept: INTERNAL MEDICINE | Facility: CLINIC | Age: 67
End: 2022-06-28
Payer: MEDICARE

## 2022-06-30 ENCOUNTER — CLINICAL SUPPORT (OUTPATIENT)
Dept: INTERNAL MEDICINE | Facility: CLINIC | Age: 67
End: 2022-06-30
Payer: MEDICARE

## 2022-06-30 ENCOUNTER — OFFICE VISIT (OUTPATIENT)
Dept: OPHTHALMOLOGY | Facility: CLINIC | Age: 67
End: 2022-06-30
Payer: MEDICARE

## 2022-06-30 ENCOUNTER — OFFICE VISIT (OUTPATIENT)
Dept: PODIATRY | Facility: CLINIC | Age: 67
End: 2022-06-30
Payer: MEDICARE

## 2022-06-30 VITALS — WEIGHT: 205.69 LBS | HEIGHT: 63 IN | BODY MASS INDEX: 36.45 KG/M2

## 2022-06-30 VITALS — OXYGEN SATURATION: 98 % | HEART RATE: 75 BPM | SYSTOLIC BLOOD PRESSURE: 130 MMHG | DIASTOLIC BLOOD PRESSURE: 80 MMHG

## 2022-06-30 DIAGNOSIS — H52.7 REFRACTIVE ERRORS: ICD-10-CM

## 2022-06-30 DIAGNOSIS — E11.49 OTHER DIABETIC NEUROLOGICAL COMPLICATION ASSOCIATED WITH TYPE 2 DIABETES MELLITUS: ICD-10-CM

## 2022-06-30 DIAGNOSIS — L60.3 ONYCHODYSTROPHY: ICD-10-CM

## 2022-06-30 DIAGNOSIS — E11.36 DIABETIC CATARACT: ICD-10-CM

## 2022-06-30 DIAGNOSIS — I69.354 HEMIPARESIS AFFECTING LEFT SIDE AS LATE EFFECT OF CEREBROVASCULAR ACCIDENT: ICD-10-CM

## 2022-06-30 DIAGNOSIS — E11.3299 BDR (BACKGROUND DIABETIC RETINOPATHY): Primary | ICD-10-CM

## 2022-06-30 DIAGNOSIS — E11.51 TYPE II DIABETES MELLITUS WITH PERIPHERAL CIRCULATORY DISORDER: Primary | ICD-10-CM

## 2022-06-30 PROCEDURE — 1101F PR PT FALLS ASSESS DOC 0-1 FALLS W/OUT INJ PAST YR: ICD-10-PCS | Mod: CPTII,S$GLB,, | Performed by: PODIATRIST

## 2022-06-30 PROCEDURE — 3066F NEPHROPATHY DOC TX: CPT | Mod: CPTII,S$GLB,, | Performed by: PODIATRIST

## 2022-06-30 PROCEDURE — 92015 DETERMINE REFRACTIVE STATE: CPT | Mod: S$GLB,,, | Performed by: OPTOMETRIST

## 2022-06-30 PROCEDURE — 1159F MED LIST DOCD IN RCRD: CPT | Mod: CPTII,S$GLB,, | Performed by: PODIATRIST

## 2022-06-30 PROCEDURE — 3288F PR FALLS RISK ASSESSMENT DOCUMENTED: ICD-10-PCS | Mod: CPTII,S$GLB,, | Performed by: PODIATRIST

## 2022-06-30 PROCEDURE — 3061F NEG MICROALBUMINURIA REV: CPT | Mod: CPTII,S$GLB,, | Performed by: OPTOMETRIST

## 2022-06-30 PROCEDURE — 4010F ACE/ARB THERAPY RXD/TAKEN: CPT | Mod: CPTII,S$GLB,, | Performed by: PODIATRIST

## 2022-06-30 PROCEDURE — 2022F DILAT RTA XM EVC RTNOPTHY: CPT | Mod: CPTII,S$GLB,, | Performed by: OPTOMETRIST

## 2022-06-30 PROCEDURE — 99499 NO LOS: ICD-10-PCS | Mod: S$GLB,,, | Performed by: PODIATRIST

## 2022-06-30 PROCEDURE — 1159F PR MEDICATION LIST DOCUMENTED IN MEDICAL RECORD: ICD-10-PCS | Mod: CPTII,S$GLB,, | Performed by: PODIATRIST

## 2022-06-30 PROCEDURE — 3052F PR MOST RECENT HEMOGLOBIN A1C LEVEL 8.0 - < 9.0%: ICD-10-PCS | Mod: CPTII,S$GLB,, | Performed by: OPTOMETRIST

## 2022-06-30 PROCEDURE — 3066F PR DOCUMENTATION OF TREATMENT FOR NEPHROPATHY: ICD-10-PCS | Mod: CPTII,S$GLB,, | Performed by: OPTOMETRIST

## 2022-06-30 PROCEDURE — 1126F AMNT PAIN NOTED NONE PRSNT: CPT | Mod: CPTII,S$GLB,, | Performed by: PODIATRIST

## 2022-06-30 PROCEDURE — 3052F HG A1C>EQUAL 8.0%<EQUAL 9.0%: CPT | Mod: CPTII,S$GLB,, | Performed by: PODIATRIST

## 2022-06-30 PROCEDURE — 1160F RVW MEDS BY RX/DR IN RCRD: CPT | Mod: CPTII,S$GLB,, | Performed by: PODIATRIST

## 2022-06-30 PROCEDURE — 3008F BODY MASS INDEX DOCD: CPT | Mod: CPTII,S$GLB,, | Performed by: PODIATRIST

## 2022-06-30 PROCEDURE — 92004 COMPRE OPH EXAM NEW PT 1/>: CPT | Mod: S$GLB,,, | Performed by: OPTOMETRIST

## 2022-06-30 PROCEDURE — 3066F NEPHROPATHY DOC TX: CPT | Mod: CPTII,S$GLB,, | Performed by: OPTOMETRIST

## 2022-06-30 PROCEDURE — 99999 PR PBB SHADOW E&M-EST. PATIENT-LVL III: ICD-10-PCS | Mod: PBBFAC,,, | Performed by: OPTOMETRIST

## 2022-06-30 PROCEDURE — 1101F PT FALLS ASSESS-DOCD LE1/YR: CPT | Mod: CPTII,S$GLB,, | Performed by: PODIATRIST

## 2022-06-30 PROCEDURE — 3061F NEG MICROALBUMINURIA REV: CPT | Mod: CPTII,S$GLB,, | Performed by: PODIATRIST

## 2022-06-30 PROCEDURE — 11721 DEBRIDE NAIL 6 OR MORE: CPT | Mod: Q9,S$GLB,, | Performed by: PODIATRIST

## 2022-06-30 PROCEDURE — 99999 PR PBB SHADOW E&M-EST. PATIENT-LVL II: CPT | Mod: PBBFAC,,,

## 2022-06-30 PROCEDURE — 1126F PR PAIN SEVERITY QUANTIFIED, NO PAIN PRESENT: ICD-10-PCS | Mod: CPTII,S$GLB,, | Performed by: PODIATRIST

## 2022-06-30 PROCEDURE — 2022F PR DILATED RETINAL EYE EXAM WITH INTERP/REVIEW: ICD-10-PCS | Mod: CPTII,S$GLB,, | Performed by: OPTOMETRIST

## 2022-06-30 PROCEDURE — 3061F PR NEG MICROALBUMINURIA RESULT DOCUMENTED/REVIEW: ICD-10-PCS | Mod: CPTII,S$GLB,, | Performed by: OPTOMETRIST

## 2022-06-30 PROCEDURE — 1159F MED LIST DOCD IN RCRD: CPT | Mod: CPTII,S$GLB,, | Performed by: OPTOMETRIST

## 2022-06-30 PROCEDURE — 1159F PR MEDICATION LIST DOCUMENTED IN MEDICAL RECORD: ICD-10-PCS | Mod: CPTII,S$GLB,, | Performed by: OPTOMETRIST

## 2022-06-30 PROCEDURE — 99999 PR PBB SHADOW E&M-EST. PATIENT-LVL II: ICD-10-PCS | Mod: PBBFAC,,,

## 2022-06-30 PROCEDURE — 3052F HG A1C>EQUAL 8.0%<EQUAL 9.0%: CPT | Mod: CPTII,S$GLB,, | Performed by: OPTOMETRIST

## 2022-06-30 PROCEDURE — 3066F PR DOCUMENTATION OF TREATMENT FOR NEPHROPATHY: ICD-10-PCS | Mod: CPTII,S$GLB,, | Performed by: PODIATRIST

## 2022-06-30 PROCEDURE — 11721 PR DEBRIDEMENT OF NAILS, 6 OR MORE: ICD-10-PCS | Mod: Q9,S$GLB,, | Performed by: PODIATRIST

## 2022-06-30 PROCEDURE — 99999 PR PBB SHADOW E&M-EST. PATIENT-LVL IV: CPT | Mod: PBBFAC,,, | Performed by: PODIATRIST

## 2022-06-30 PROCEDURE — 99999 PR PBB SHADOW E&M-EST. PATIENT-LVL IV: ICD-10-PCS | Mod: PBBFAC,,, | Performed by: PODIATRIST

## 2022-06-30 PROCEDURE — 92015 PR REFRACTION: ICD-10-PCS | Mod: S$GLB,,, | Performed by: OPTOMETRIST

## 2022-06-30 PROCEDURE — 3052F PR MOST RECENT HEMOGLOBIN A1C LEVEL 8.0 - < 9.0%: ICD-10-PCS | Mod: CPTII,S$GLB,, | Performed by: PODIATRIST

## 2022-06-30 PROCEDURE — 3008F PR BODY MASS INDEX (BMI) DOCUMENTED: ICD-10-PCS | Mod: CPTII,S$GLB,, | Performed by: PODIATRIST

## 2022-06-30 PROCEDURE — 4010F PR ACE/ARB THEARPY RXD/TAKEN: ICD-10-PCS | Mod: CPTII,S$GLB,, | Performed by: PODIATRIST

## 2022-06-30 PROCEDURE — 1160F PR REVIEW ALL MEDS BY PRESCRIBER/CLIN PHARMACIST DOCUMENTED: ICD-10-PCS | Mod: CPTII,S$GLB,, | Performed by: PODIATRIST

## 2022-06-30 PROCEDURE — 3288F FALL RISK ASSESSMENT DOCD: CPT | Mod: CPTII,S$GLB,, | Performed by: PODIATRIST

## 2022-06-30 PROCEDURE — 99499 UNLISTED E&M SERVICE: CPT | Mod: S$GLB,,, | Performed by: PODIATRIST

## 2022-06-30 PROCEDURE — 3061F PR NEG MICROALBUMINURIA RESULT DOCUMENTED/REVIEW: ICD-10-PCS | Mod: CPTII,S$GLB,, | Performed by: PODIATRIST

## 2022-06-30 PROCEDURE — 92004 PR EYE EXAM, NEW PATIENT,COMPREHESV: ICD-10-PCS | Mod: S$GLB,,, | Performed by: OPTOMETRIST

## 2022-06-30 PROCEDURE — 99999 PR PBB SHADOW E&M-EST. PATIENT-LVL III: CPT | Mod: PBBFAC,,, | Performed by: OPTOMETRIST

## 2022-06-30 PROCEDURE — 4010F PR ACE/ARB THEARPY RXD/TAKEN: ICD-10-PCS | Mod: CPTII,S$GLB,, | Performed by: OPTOMETRIST

## 2022-06-30 PROCEDURE — 4010F ACE/ARB THERAPY RXD/TAKEN: CPT | Mod: CPTII,S$GLB,, | Performed by: OPTOMETRIST

## 2022-06-30 NOTE — PROGRESS NOTES
Pt came in for a nurse visit for a blood pressure check. Asked pt if she has taken her BP medication today to which she responded yes. Patients daughter was with her and asked if the Metformin makes her sugar go high I responded that the medicine does not increase insulin levels in the body, but instead lessens the amount of sugar the body produces and absorbs. Patient and daughter were verbally understanding. I asked if they would like like a follow up appt with a provider to check her blood sugar levels to which they declined. Patient denies feeling weak, having shortness of breath or numbness or tingling. With a elevated but otherwise normal BP patient was released and instructed to watch blood sugar and blood pressure closely and if anything changes to give us call.    1st   BP: 130/80  Pulse: 75  Oxygen: 98%

## 2022-06-30 NOTE — PROGRESS NOTES
HPI     NIDDM exam  Decrease near visual acuity.  New patient last eye exam 1 year  Update glasses RX.  Lab Results       Component                Value               Date                       LABA1C                   14.4                01/06/2019                 HGBA1C                   8.2 (H)             04/19/2022                Last edited by Renu Saenz MA on 6/30/2022  1:58 PM. (History)            Assessment /Plan     For exam results, see Encounter Report.    BDR (background diabetic retinopathy)    Diabetic cataract    Refractive errors      Mild BDR OS    Reduced vision OU, BVA 20/70 20/80, high A1c vs cataracts    RTC 2-3 weeks mOCT OU

## 2022-06-30 NOTE — PROGRESS NOTES
Subjective:       Patient ID: Krysta Manuel is a 67 y.o. female.    Chief Complaint: Nail Care (Coming in for 3 month DNC, rates pain 0/10, diabetic,wearing socks and tennis shoes, last seen PCP Dr. Cook on 01/31/2022.)      HPI: Patient presents to the office today with the chief complaint of elongated, thickened and dystrophic nail plates to the B/L foot.This patient is a Diabetic Type II, with hemiparesis cyst affecting the left side of her body. Patient does follow with Primary Care and/or Endocrinology for management of Diabetes Mellitus. This patient's PMD is Wanda Cook DO. This patient last saw his/her primary care provider on 01/31/2022.     Hemoglobin A1C   Date Value Ref Range Status   04/19/2022 8.2 (H) 4.0 - 5.6 % Final     Comment:     ADA Screening Guidelines:  5.7-6.4%  Consistent with prediabetes  >or=6.5%  Consistent with diabetes    High levels of fetal hemoglobin interfere with the HbA1C  assay. Heterozygous hemoglobin variants (HbS, HgC, etc)do  not significantly interfere with this assay.   However, presence of multiple variants may affect accuracy.     12/29/2021 7.8 % Final   09/28/2021 8.7 % Final   06/28/2021 6.3 % Final   01/29/2018 10.5 (H) 4.0 - 5.6 % Final     Comment:     According to ADA guidelines, hemoglobin A1c <7.0% represents  optimal control in non-pregnant diabetic patients. Different  metrics may apply to specific patient populations.   Standards of Medical Care in Diabetes-2016.  For the purpose of screening for the presence of diabetes:  <5.7%     Consistent with the absence of diabetes  5.7-6.4%  Consistent with increasing risk for diabetes   (prediabetes)  >or=6.5%  Consistent with diabetes  Currently, no consensus exists for use of hemoglobin A1c  for diagnosis of diabetes for children.  This Hemoglobin A1c assay has significant interference with fetal   hemoglobin   (HbF). The results are invalid for patients with abnormal amounts of   HbF,   including those  "with known Hereditary Persistence   of Fetal Hemoglobin. Heterozygous hemoglobin variants (HbAS, HbAC,   HbAD, HbAE, HbA2) do not significantly interfere with this assay;   however, presence of multiple variants in a sample may impact the %   interference.     .     Review of patient's allergies indicates:  No Known Allergies    Past Medical History:   Diagnosis Date    Arthritis     Depression     DM II (diabetes mellitus, type II), controlled 12 years    BS didn't check 06/30/2022 Insulin x 12 years    Heart disease     HTN (hypertension)     Hyperlipidemia     Hypothyroidism     Insomnia     Stroke 2010    Vitamin D deficiency        Family History   Problem Relation Age of Onset    Hypertension Mother     Diabetes Sister        Social History     Socioeconomic History    Marital status:    Tobacco Use    Smoking status: Never Smoker    Smokeless tobacco: Never Used   Substance and Sexual Activity    Alcohol use: Not Currently       Past Surgical History:   Procedure Laterality Date    COLONOSCOPY      HYSTERECTOMY      THYROIDECTOMY      TOTAL ABDOMINAL HYSTERECTOMY W/ BILATERAL SALPINGOOPHORECTOMY         Review of Systems       Objective:   Ht 5' 3" (1.6 m)   Wt 93.3 kg (205 lb 11 oz)   BMI 36.44 kg/m²     Physical Exam  LOWER EXTREMITY PHYSICAL EXAMINATION    VASCULAR:  The right dorsalis pedis pulse 2/4 and the right posterior tibial pulse 2/4.  The left dorsalis pedis pulse 2/4 and posterior tibial pulse on the left is 2/4.  Capillary refill is intact.  Pedal hair growth intact  NEUROLOGY: Protective sensation is not intact to the left and right plantar surfaces of the foot and digits, as the patient has no sensation/detection at greater than 4 distinct points of contact with 5.07 Kathryn Fina monofilament. Sensation to light touch is intact on the left and right foot. Proprioception is intact, bilateral. Sensation to pin prick is reduced to absent. Vibratory sensation is " diminished.    DERMATOLOGY:  Skin is supple, moist, intact.  There is no signs of callusing, ulcerations, other lesions identified to the dorsal or plantar aspect of the right or left foot.  The R1, 2, 5 and left L1,2, 5 are thickened, discolored dystrophic.  There is subungual debris.  Nail plates have area of dark discoloration.  The remaining nails 3-4 on the right foot and the left foot are elongated but of normal color, thickness, and texture.   There is no signs of ingrowing into the medial or lateral borders.  There is no evidence of wounds or skin breakdown.  No edema or erythema.  No obvious lacerations or fissuring.  Interdigital spaces are clean, dry, intact.  No rashes or scars appreciated.    ORTHOPEDIC:  Weakness present to the left lower extremity.  Ambulates with wheelchair for mobility.     Assessment:     1. Type II diabetes mellitus with peripheral circulatory disorder    2. Other diabetic neurological complication associated with type 2 diabetes mellitus    3. Hemiparesis affecting left side as late effect of cerebrovascular accident    4. Onychodystrophy        Plan:     Type II diabetes mellitus with peripheral circulatory disorder    Other diabetic neurological complication associated with type 2 diabetes mellitus    Hemiparesis affecting left side as late effect of cerebrovascular accident    Onychodystrophy        Thorough discussion is had with the patient this afternoon, concerning the diagnosis, its etiology, and the treatment algorithm at present.  Greater than 50% of this visit spent on counseling and coordination of care. Greater than 15 minutes of a 20 minute appointment spent on education about the diabetic foot, neuropathy, and prevention of limb loss.  Shoe inspection. Diabetic Foot Education. Patient reminded of the importance of good nutrition and blood sugar control to help prevent podiatric complications of diabetes. Patient instructed on proper foot hygeine. We discussed  wearing proper and supportive shoe gear, daily foot inspections, never walking barefooted or sock footed, never putting sharp instruments to feet which can cause major complications associated with infection, ulcers, lacerations.      Dystrophic nail plates, as outlined above (R#1,2,5  ; L#1,2,5 ), are sharply debrided with double action nail nipper, and/or with the assistance of a mechanical rotary mark anthony, with removal of all offending nail and nail border(s), for reduction of pains. Nails are reduced in terms of length, width and girth with removal of subungual debris to facilitate pain free weight bearing and ambulation. The elongated nails as outlined in the objective portion of this note, were trimmed to appropriate length, with a double action nail nipper, for alleviation/reduction of pains as well. Follow up in approx. 3-4 months.      Future Appointments   Date Time Provider Department Center   7/6/2022 10:00 AM Liz Harrell PA-C [facility]   7/8/2022  9:30 AM Zenaida Shetty NP [facility] [place]   7/18/2022  9:10 AM LABORATORY, [facility] [place]   7/26/2022 11:20 AM Wanda Cook DO [facility]   8/5/2022  9:55 AM LABORATORY, [facility]   8/12/2022  8:30 AM Zenaida Shetty NP [facility] [place]   8/17/2022  9:00 AM Mario Alberto Pierre MD [facility]   10/10/2022  2:45 PM Madelin Pradhan DPM [facility]   10/26/2022  9:30 AM Frieda Hammond RD, CDE [facility] DIB[facility] [place]   11/4/2022  1:30 PM Zenaida Shetty NP [facility] [place]

## 2022-07-14 ENCOUNTER — OFFICE VISIT (OUTPATIENT)
Dept: DIABETES | Facility: CLINIC | Age: 67
End: 2022-07-14
Payer: MEDICARE

## 2022-07-14 VITALS
HEART RATE: 90 BPM | WEIGHT: 207.69 LBS | SYSTOLIC BLOOD PRESSURE: 125 MMHG | HEIGHT: 63 IN | DIASTOLIC BLOOD PRESSURE: 82 MMHG | BODY MASS INDEX: 36.8 KG/M2

## 2022-07-14 DIAGNOSIS — E11.51 TYPE II DIABETES MELLITUS WITH PERIPHERAL CIRCULATORY DISORDER: Primary | ICD-10-CM

## 2022-07-14 DIAGNOSIS — E78.5 HYPERLIPIDEMIA, UNSPECIFIED HYPERLIPIDEMIA TYPE: ICD-10-CM

## 2022-07-14 DIAGNOSIS — I10 HYPERTENSION, UNSPECIFIED TYPE: ICD-10-CM

## 2022-07-14 LAB — GLUCOSE SERPL-MCNC: 104 MG/DL (ref 70–110)

## 2022-07-14 PROCEDURE — 1159F MED LIST DOCD IN RCRD: CPT | Mod: CPTII,S$GLB,, | Performed by: NURSE PRACTITIONER

## 2022-07-14 PROCEDURE — 1126F AMNT PAIN NOTED NONE PRSNT: CPT | Mod: CPTII,S$GLB,, | Performed by: NURSE PRACTITIONER

## 2022-07-14 PROCEDURE — 3288F PR FALLS RISK ASSESSMENT DOCUMENTED: ICD-10-PCS | Mod: CPTII,S$GLB,, | Performed by: NURSE PRACTITIONER

## 2022-07-14 PROCEDURE — 99999 PR PBB SHADOW E&M-EST. PATIENT-LVL IV: CPT | Mod: PBBFAC,,, | Performed by: NURSE PRACTITIONER

## 2022-07-14 PROCEDURE — 99999 PR PBB SHADOW E&M-EST. PATIENT-LVL IV: ICD-10-PCS | Mod: PBBFAC,,, | Performed by: NURSE PRACTITIONER

## 2022-07-14 PROCEDURE — 3288F FALL RISK ASSESSMENT DOCD: CPT | Mod: CPTII,S$GLB,, | Performed by: NURSE PRACTITIONER

## 2022-07-14 PROCEDURE — 3061F NEG MICROALBUMINURIA REV: CPT | Mod: CPTII,S$GLB,, | Performed by: NURSE PRACTITIONER

## 2022-07-14 PROCEDURE — 3008F BODY MASS INDEX DOCD: CPT | Mod: CPTII,S$GLB,, | Performed by: NURSE PRACTITIONER

## 2022-07-14 PROCEDURE — 3066F PR DOCUMENTATION OF TREATMENT FOR NEPHROPATHY: ICD-10-PCS | Mod: CPTII,S$GLB,, | Performed by: NURSE PRACTITIONER

## 2022-07-14 PROCEDURE — 1159F PR MEDICATION LIST DOCUMENTED IN MEDICAL RECORD: ICD-10-PCS | Mod: CPTII,S$GLB,, | Performed by: NURSE PRACTITIONER

## 2022-07-14 PROCEDURE — 99214 OFFICE O/P EST MOD 30 MIN: CPT | Mod: S$GLB,,, | Performed by: NURSE PRACTITIONER

## 2022-07-14 PROCEDURE — 3074F SYST BP LT 130 MM HG: CPT | Mod: CPTII,S$GLB,, | Performed by: NURSE PRACTITIONER

## 2022-07-14 PROCEDURE — 3079F PR MOST RECENT DIASTOLIC BLOOD PRESSURE 80-89 MM HG: ICD-10-PCS | Mod: CPTII,S$GLB,, | Performed by: NURSE PRACTITIONER

## 2022-07-14 PROCEDURE — 1160F RVW MEDS BY RX/DR IN RCRD: CPT | Mod: CPTII,S$GLB,, | Performed by: NURSE PRACTITIONER

## 2022-07-14 PROCEDURE — 3008F PR BODY MASS INDEX (BMI) DOCUMENTED: ICD-10-PCS | Mod: CPTII,S$GLB,, | Performed by: NURSE PRACTITIONER

## 2022-07-14 PROCEDURE — 1160F PR REVIEW ALL MEDS BY PRESCRIBER/CLIN PHARMACIST DOCUMENTED: ICD-10-PCS | Mod: CPTII,S$GLB,, | Performed by: NURSE PRACTITIONER

## 2022-07-14 PROCEDURE — 1126F PR PAIN SEVERITY QUANTIFIED, NO PAIN PRESENT: ICD-10-PCS | Mod: CPTII,S$GLB,, | Performed by: NURSE PRACTITIONER

## 2022-07-14 PROCEDURE — 82962 GLUCOSE BLOOD TEST: CPT | Mod: S$GLB,,, | Performed by: NURSE PRACTITIONER

## 2022-07-14 PROCEDURE — 3052F HG A1C>EQUAL 8.0%<EQUAL 9.0%: CPT | Mod: CPTII,S$GLB,, | Performed by: NURSE PRACTITIONER

## 2022-07-14 PROCEDURE — 1101F PT FALLS ASSESS-DOCD LE1/YR: CPT | Mod: CPTII,S$GLB,, | Performed by: NURSE PRACTITIONER

## 2022-07-14 PROCEDURE — 99214 PR OFFICE/OUTPT VISIT, EST, LEVL IV, 30-39 MIN: ICD-10-PCS | Mod: S$GLB,,, | Performed by: NURSE PRACTITIONER

## 2022-07-14 PROCEDURE — 3052F PR MOST RECENT HEMOGLOBIN A1C LEVEL 8.0 - < 9.0%: ICD-10-PCS | Mod: CPTII,S$GLB,, | Performed by: NURSE PRACTITIONER

## 2022-07-14 PROCEDURE — 3079F DIAST BP 80-89 MM HG: CPT | Mod: CPTII,S$GLB,, | Performed by: NURSE PRACTITIONER

## 2022-07-14 PROCEDURE — 3066F NEPHROPATHY DOC TX: CPT | Mod: CPTII,S$GLB,, | Performed by: NURSE PRACTITIONER

## 2022-07-14 PROCEDURE — 82962 POCT GLUCOSE, HAND-HELD DEVICE: ICD-10-PCS | Mod: S$GLB,,, | Performed by: NURSE PRACTITIONER

## 2022-07-14 PROCEDURE — 3074F PR MOST RECENT SYSTOLIC BLOOD PRESSURE < 130 MM HG: ICD-10-PCS | Mod: CPTII,S$GLB,, | Performed by: NURSE PRACTITIONER

## 2022-07-14 PROCEDURE — 1101F PR PT FALLS ASSESS DOC 0-1 FALLS W/OUT INJ PAST YR: ICD-10-PCS | Mod: CPTII,S$GLB,, | Performed by: NURSE PRACTITIONER

## 2022-07-14 PROCEDURE — 4010F PR ACE/ARB THEARPY RXD/TAKEN: ICD-10-PCS | Mod: CPTII,S$GLB,, | Performed by: NURSE PRACTITIONER

## 2022-07-14 PROCEDURE — 3061F PR NEG MICROALBUMINURIA RESULT DOCUMENTED/REVIEW: ICD-10-PCS | Mod: CPTII,S$GLB,, | Performed by: NURSE PRACTITIONER

## 2022-07-14 PROCEDURE — 4010F ACE/ARB THERAPY RXD/TAKEN: CPT | Mod: CPTII,S$GLB,, | Performed by: NURSE PRACTITIONER

## 2022-07-14 RX ORDER — INSULIN GLARGINE 100 [IU]/ML
60 INJECTION, SOLUTION SUBCUTANEOUS NIGHTLY
Qty: 30 ML | Refills: 3
Start: 2022-07-14 | End: 2022-10-06

## 2022-07-14 RX ORDER — GLIPIZIDE 10 MG/1
10 TABLET ORAL
Qty: 90 TABLET | Refills: 1 | Status: SHIPPED | OUTPATIENT
Start: 2022-07-14 | End: 2022-11-04

## 2022-07-14 NOTE — PATIENT INSTRUCTIONS
Medication Regimen/Changes:   - Continue Trulicity 3 mg weekly  - Increase Glipizide to 10 tablet, 1 tablet before breakfast and 1 tablet before dinner   - Increase Lantus 60 units in the morning.   - Continue Metformin 500 mg, 2 tablets twice daily with meals    Patient Instructions:  Carbohydrate Count: 30-45 grams/meal and 15 grams/snack with limit of 2 snacks per day.  Exercise: Goal is 150 minutes or more per week.  Bring meter and blood sugar log to each appointment.     Goals for Blood Sugar:  Fastin-130 mg/dl  2 hours after meal:  mg/dl  Before Bed: 100-150 mg/dl  If Blood sugar is below 70 mg/dl, DO NOT take diabetes medication. Eat first and then recheck blood sugar in 20 minutes.  Foods to eat if blood sugar is below 70 mg/dl  1/2 glass of orange juice   1/2 regular cola can   3-4 hard candies   3-4 small glucose tabs.   Foods to eat if blood sugar is below 50 mg/dl  1 glass of orange juice  1 regular cola can  8 hard candies  8 small glucose tabs.   If you have repeated eating/blood sugar recheck process 2 times and blood sugar still below 70 mg/dl, call 911.

## 2022-07-14 NOTE — PROGRESS NOTES
PCP: Wanda Cook DO    Subjective:     Chief Complaint: Diabetes follow up.  Last visit with me: 5/5/2022 for initial consult.    HPI: 66 y.o.  female for diabetes follow up.   Type II diabetes for > 10 years.  Comorbidities: HTN, HLD, CAD, S/p CVA (2010), Left Hemiparesis, Hypothyroidism and Overweight by BMI   Accompanied by daughter during visit.    Family History of Type 2 DM: father  Known diabetes complications:  DKA/HHS: no  Retinopathy: no  Peripheral neuropathy: no  Nephropathy: no    Interval history:  Denies recent hospital admissions or ER visits.   Denies having hypoglycemia.   Endorses diabetes related symptoms: None.    Blood glucose monitoring via fingerstick: 2-3 x/day   Per patient's BG log, over the last 4 weeks   Fasting , 116, 180, 192, 245, 259   AC lunch 190- mid 300's, 409   AC dinner 137, 139, 180's- 200's     Activity: Sedentary  Diet:3 meals/day;1 snacks/day.    Social history:    - Single, Lives with daughter, grandchild    Medication compliance all of the time, diet compliance most of the time.   To be enrolled in diabetes education classes.     Previous regimen: Humalog 75/25    Past failed treatment: N/A    Current DM Medications:   - Trulicity 3 mg weekly   - Glipizide 10 mg, 1/2 tablet ac breakfast and ac dinner   - Lantus 56 units qam: taking 50 units   - Metformin 500 mg 2 tablets bid with meals    Allergies  Review of patient's allergies indicates:  No Known Allergies    Labs Reviewed:  Her most recent A1C is:  Lab Results   Component Value Date    HGBA1C 8.2 (H) 04/19/2022    HGBA1C 7.8 12/29/2021    HGBA1C 8.7 09/28/2021       Her most recent BMP is:  Lab Results   Component Value Date     04/19/2022    K 4.5 04/19/2022     04/19/2022    CO2 27 04/19/2022    BUN 20 04/19/2022    CREATININE 1.0 04/19/2022    CALCIUM 10.5 04/19/2022    ANIONGAP 11 04/19/2022    ESTGFRAFRICA >60.0 04/19/2022    EGFRNONAA 58.4 (A) 04/19/2022       No results  found for: CPEPTIDE  No results found for: GLUTAMICACID    Body mass index is 36.79 kg/m².    Weight lost 3 lbs since last visit.  Wt Readings from Last 3 Encounters:   07/14/22 0902 94.2 kg (207 lb 10.8 oz)   06/30/22 1544 93.3 kg (205 lb 11 oz)   06/06/22 1000 93.3 kg (205 lb 11 oz)        Her blood sugar in clinic today is: 104     Diabetes Management Status  Statin: Taking  ACE/ARB: Taking    Screening or Prevention Patient's value Goal Complete/Controlled?   HgA1C Testing and Control   Lab Results   Component Value Date    HGBA1C 8.2 (H) 04/19/2022      Annually/Less than 8% No   Lipid profile : 04/19/2022 Annually Yes   LDL control Lab Results   Component Value Date    LDLCALC 37.4 (L) 04/19/2022    Annually/Less than 100 mg/dl  Yes   Nephropathy screening Lab Results   Component Value Date    MICALBCREAT 4.6 02/03/2022     No results found for: PROTEINUA Annually Yes   Blood pressure BP Readings from Last 1 Encounters:   07/14/22 125/82    Less than 140/90 Yes   Dilated retinal exam : 06/30/2022 Annually Yes    Foot exam   : 05/05/2022 Annually Yes     Social History     Socioeconomic History    Marital status:    Tobacco Use    Smoking status: Never Smoker    Smokeless tobacco: Never Used   Substance and Sexual Activity    Alcohol use: Not Currently     Past Medical History:   Diagnosis Date    Arthritis     Depression     DM II (diabetes mellitus, type II), controlled 12 years    BS didn't check 06/30/2022 Insulin x 12 years    Heart disease     HTN (hypertension)     Hyperlipidemia     Hypothyroidism     Insomnia     Stroke 2010    Vitamin D deficiency      Review of Systems   Constitutional: Negative for activity change, appetite change, fatigue and unexpected weight change.   HENT: Negative for dental problem and trouble swallowing.    Eyes: Negative for visual disturbance.   Respiratory: Negative for chest tightness and shortness of breath.    Cardiovascular: Negative for chest  pain, palpitations and leg swelling.   Gastrointestinal: Negative for abdominal pain, constipation, diarrhea, nausea and vomiting.   Endocrine: Negative for polydipsia, polyphagia and polyuria.   Genitourinary: Negative for difficulty urinating, dysuria, frequency and urgency.        Negative yeast infection   Musculoskeletal: Positive for gait problem (r/t left sided weakness, ambulates with quad cane). Negative for myalgias and neck pain.   Integumentary:  Negative for rash and wound.   Allergic/Immunologic: Negative.    Neurological: Positive for weakness (residual left sided secondary to CVA). Negative for dizziness, syncope, numbness and headaches.   Hematological: Negative.    Psychiatric/Behavioral: Negative for confusion and sleep disturbance.   All other systems reviewed and are negative.     Objective:      Physical Exam  Vitals reviewed.   Constitutional:       Appearance: Normal appearance.   HENT:      Head: Normocephalic and atraumatic.   Eyes:      General: Vision grossly intact.      Extraocular Movements: Extraocular movements intact.      Pupils: Pupils are equal, round, and reactive to light.   Neck:      Thyroid: No thyroid mass or thyroid tenderness.   Cardiovascular:      Rate and Rhythm: Normal rate and regular rhythm.      Pulses: Normal pulses.           Radial pulses are 2+ on the right side and 2+ on the left side.        Dorsalis pedis pulses are 2+ on the right side and 2+ on the left side.      Heart sounds: Normal heart sounds.   Pulmonary:      Effort: Pulmonary effort is normal.      Breath sounds: Normal breath sounds.   Abdominal:      General: Bowel sounds are normal.      Palpations: Abdomen is soft.   Musculoskeletal:         General: Normal range of motion.      Cervical back: Normal range of motion and neck supple.      Right lower leg: No edema.      Left lower leg: No edema.      Right foot: No deformity or bunion.      Left foot: No deformity or bunion.   Feet:      Right  foot:      Skin integrity: Skin integrity normal. No ulcer, skin breakdown, callus or dry skin.      Toenail Condition: Right toenails are abnormally thick.      Left foot:      Skin integrity: Skin integrity normal. No ulcer, skin breakdown, callus or dry skin.      Toenail Condition: Left toenails are abnormally thick.   Lymphadenopathy:      Cervical: No cervical adenopathy.   Skin:     General: Skin is warm and dry.      Findings: No rash or wound.      Comments: Negative for acanthosis nigricans. Well-healed SQ injection sites.   Neurological:      Mental Status: She is alert and oriented to person, place, and time.      Coordination: Coordination is intact.      Gait: Gait is intact.   Psychiatric:         Attention and Perception: Attention normal.         Mood and Affect: Mood normal.         Speech: Speech normal.         Behavior: Behavior normal. Behavior is cooperative.         Thought Content: Thought content normal.         Cognition and Memory: Cognition normal.      Assessment / Plan:     Diagnoses and all orders for this visit:    Type II diabetes mellitus with peripheral circulatory disorder  -     LANTUS SOLOSTAR U-100 INSULIN glargine 100 units/mL SubQ pen; Inject 60 Units into the skin every evening.  -     glipiZIDE (GLUCOTROL) 10 MG tablet; Take 1 tablet (10 mg total) by mouth 2 (two) times daily before meals.  -     POCT Glucose, Hand-Held Device    Hypertension, unspecified type    Hyperlipidemia, unspecified hyperlipidemia type    BMI 36.0-36.9,adult    Additional Plan Details:  - Condition: uncontrolled, most recent A1C of 8.2% was completed 3 months ago.   - Monitor blood glucose:  4x daily.Goals reviewed. Maintain BG log. Bring to next visit for review.  - Hypoglycemia protocol: Reviewed and risk discussed.  Notify if BG readings below 80. Protocol printout provided.   - Diet: Limit carbohydrate intake 30-45 grams/meal, 3x daily and 15 grams/snack, limit 2/day.   - Activity: Recommend at  least 150 minutes of exercise in a week.  - BP and LDL: Recommend lifestyle modifications and follow up with PCP for management.   - Medication Changes:    - Continue Trulicity 3 mg weekly   - Increase Glipizide to 10 tablet, 1 tablet before breakfast and 1 tablet before dinner    - Increase Lantus 60 units in the morning  - Continue Metformin 500 mg, 2 tablets twice daily with meals    - Medication consideration: Titrate Trulicity/basal to goal BG. May also benefit from SGLT 2 inhibitor.  - Lab results: A1C discussed.  - Health Maintenance standards addressed today:  A1c scheduled.   - Risks of uncontrolled DM explained. Importance of routine foot and eye exams reviewed. Scheduled as appropriate.  -The patient was explained the above plan and given opportunity to ask questions.  She understands, chooses and consents to this plan and accepts all the risks, which include but are not limited to the risks mentioned above.   - Labs ordered as above.  - CDE referral: Scheduled  - Follow up: 1 month with A1c lab draw prior.       Charlotte T. Delacruz, FNP-C [facility]    A total of 30 minutes was spent in face to face time, of which over 50 % was spent in counseling patient on disease process, complications, treatment, and side effects of medications.

## 2022-07-18 ENCOUNTER — LAB VISIT (OUTPATIENT)
Dept: LAB | Facility: HOSPITAL | Age: 67
End: 2022-07-18
Attending: INTERNAL MEDICINE
Payer: MEDICARE

## 2022-07-18 DIAGNOSIS — E03.9 HYPOTHYROIDISM (ACQUIRED): ICD-10-CM

## 2022-07-18 DIAGNOSIS — Z79.4 UNCONTROLLED TYPE 2 DIABETES MELLITUS WITH HYPERGLYCEMIA, WITH LONG-TERM CURRENT USE OF INSULIN: ICD-10-CM

## 2022-07-18 DIAGNOSIS — E78.5 HYPERLIPIDEMIA LDL GOAL <70: ICD-10-CM

## 2022-07-18 DIAGNOSIS — I10 HYPERTENSION GOAL BP (BLOOD PRESSURE) < 130/80: ICD-10-CM

## 2022-07-18 DIAGNOSIS — E11.65 UNCONTROLLED TYPE 2 DIABETES MELLITUS WITH HYPERGLYCEMIA, WITH LONG-TERM CURRENT USE OF INSULIN: ICD-10-CM

## 2022-07-18 LAB
ALBUMIN SERPL BCP-MCNC: 3.7 G/DL (ref 3.5–5.2)
ALP SERPL-CCNC: 63 U/L (ref 55–135)
ALT SERPL W/O P-5'-P-CCNC: 21 U/L (ref 10–44)
ANION GAP SERPL CALC-SCNC: 10 MMOL/L (ref 8–16)
AST SERPL-CCNC: 17 U/L (ref 10–40)
BILIRUB SERPL-MCNC: 0.3 MG/DL (ref 0.1–1)
BUN SERPL-MCNC: 25 MG/DL (ref 8–23)
CALCIUM SERPL-MCNC: 9.9 MG/DL (ref 8.7–10.5)
CHLORIDE SERPL-SCNC: 109 MMOL/L (ref 95–110)
CHOLEST SERPL-MCNC: 90 MG/DL (ref 120–199)
CHOLEST/HDLC SERPL: 2.5 {RATIO} (ref 2–5)
CO2 SERPL-SCNC: 23 MMOL/L (ref 23–29)
CREAT SERPL-MCNC: 1 MG/DL (ref 0.5–1.4)
EST. GFR  (AFRICAN AMERICAN): >60 ML/MIN/1.73 M^2
EST. GFR  (NON AFRICAN AMERICAN): 58 ML/MIN/1.73 M^2
ESTIMATED AVG GLUCOSE: 180 MG/DL (ref 68–131)
GLUCOSE SERPL-MCNC: 103 MG/DL (ref 70–110)
HBA1C MFR BLD: 7.9 % (ref 4–5.6)
HDLC SERPL-MCNC: 36 MG/DL (ref 40–75)
HDLC SERPL: 40 % (ref 20–50)
LDLC SERPL CALC-MCNC: 42 MG/DL (ref 63–159)
NONHDLC SERPL-MCNC: 54 MG/DL
POTASSIUM SERPL-SCNC: 4.6 MMOL/L (ref 3.5–5.1)
PROT SERPL-MCNC: 6.9 G/DL (ref 6–8.4)
SODIUM SERPL-SCNC: 142 MMOL/L (ref 136–145)
TRIGL SERPL-MCNC: 60 MG/DL (ref 30–150)
TSH SERPL DL<=0.005 MIU/L-ACNC: 2.8 UIU/ML (ref 0.4–4)

## 2022-07-18 PROCEDURE — 83036 HEMOGLOBIN GLYCOSYLATED A1C: CPT | Performed by: INTERNAL MEDICINE

## 2022-07-18 PROCEDURE — 84443 ASSAY THYROID STIM HORMONE: CPT | Performed by: INTERNAL MEDICINE

## 2022-07-18 PROCEDURE — 80053 COMPREHEN METABOLIC PANEL: CPT | Performed by: INTERNAL MEDICINE

## 2022-07-18 PROCEDURE — 36415 COLL VENOUS BLD VENIPUNCTURE: CPT | Performed by: INTERNAL MEDICINE

## 2022-07-18 PROCEDURE — 80061 LIPID PANEL: CPT | Performed by: INTERNAL MEDICINE

## 2022-08-11 ENCOUNTER — TELEPHONE (OUTPATIENT)
Dept: INTERNAL MEDICINE | Facility: CLINIC | Age: 67
End: 2022-08-11
Payer: MEDICARE

## 2022-08-11 NOTE — TELEPHONE ENCOUNTER
----- Message from Tiesha Domínguez sent at 8/11/2022  3:49 PM CDT -----  Pt's daughter Bruno stated the prescription for hydroCHLOROthiazide (HYDRODIURIL) 25 MG tablet was sent to the wrong pharmacy. She would like the nurse to call her back at  [phone]. Thx EL

## 2022-08-11 NOTE — TELEPHONE ENCOUNTER
Pt's daughter called on her prescription on her hydrochlorothiazide, daughter states that her pharmacy transferred the prescription to the wrong pharmacy in [place] and another pt with the same name and  picked it up that was not this pt. Stated that Mrs. Zimmerman did send it to the correct pharmacy and it was transferred  through the pharmacy and the mistake was on there end and to contact the pharmacy's manager.

## 2022-08-17 ENCOUNTER — OFFICE VISIT (OUTPATIENT)
Dept: PSYCHIATRY | Facility: CLINIC | Age: 67
End: 2022-08-17
Payer: MEDICARE

## 2022-08-17 VITALS — DIASTOLIC BLOOD PRESSURE: 83 MMHG | SYSTOLIC BLOOD PRESSURE: 133 MMHG | HEART RATE: 81 BPM

## 2022-08-17 DIAGNOSIS — Z86.73 HISTORY OF STROKE: ICD-10-CM

## 2022-08-17 DIAGNOSIS — F41.9 ANXIETY: ICD-10-CM

## 2022-08-17 DIAGNOSIS — E11.51 TYPE II DIABETES MELLITUS WITH PERIPHERAL CIRCULATORY DISORDER: ICD-10-CM

## 2022-08-17 DIAGNOSIS — I69.354 HEMIPARESIS AFFECTING LEFT SIDE AS LATE EFFECT OF CEREBROVASCULAR ACCIDENT: ICD-10-CM

## 2022-08-17 DIAGNOSIS — F33.1 DEPRESSION, MAJOR, RECURRENT, MODERATE: Primary | ICD-10-CM

## 2022-08-17 PROCEDURE — 4010F PR ACE/ARB THEARPY RXD/TAKEN: ICD-10-PCS | Mod: CPTII,S$GLB,, | Performed by: PSYCHIATRY & NEUROLOGY

## 2022-08-17 PROCEDURE — 3061F PR NEG MICROALBUMINURIA RESULT DOCUMENTED/REVIEW: ICD-10-PCS | Mod: CPTII,S$GLB,, | Performed by: PSYCHIATRY & NEUROLOGY

## 2022-08-17 PROCEDURE — 3051F HG A1C>EQUAL 7.0%<8.0%: CPT | Mod: CPTII,S$GLB,, | Performed by: PSYCHIATRY & NEUROLOGY

## 2022-08-17 PROCEDURE — 3079F PR MOST RECENT DIASTOLIC BLOOD PRESSURE 80-89 MM HG: ICD-10-PCS | Mod: CPTII,S$GLB,, | Performed by: PSYCHIATRY & NEUROLOGY

## 2022-08-17 PROCEDURE — 99499 UNLISTED E&M SERVICE: CPT | Mod: S$GLB,,, | Performed by: PSYCHIATRY & NEUROLOGY

## 2022-08-17 PROCEDURE — 99205 PR OFFICE/OUTPT VISIT, NEW, LEVL V, 60-74 MIN: ICD-10-PCS | Mod: S$GLB,,, | Performed by: PSYCHIATRY & NEUROLOGY

## 2022-08-17 PROCEDURE — 3066F NEPHROPATHY DOC TX: CPT | Mod: CPTII,S$GLB,, | Performed by: PSYCHIATRY & NEUROLOGY

## 2022-08-17 PROCEDURE — 3079F DIAST BP 80-89 MM HG: CPT | Mod: CPTII,S$GLB,, | Performed by: PSYCHIATRY & NEUROLOGY

## 2022-08-17 PROCEDURE — 3075F PR MOST RECENT SYSTOLIC BLOOD PRESS GE 130-139MM HG: ICD-10-PCS | Mod: CPTII,S$GLB,, | Performed by: PSYCHIATRY & NEUROLOGY

## 2022-08-17 PROCEDURE — 3061F NEG MICROALBUMINURIA REV: CPT | Mod: CPTII,S$GLB,, | Performed by: PSYCHIATRY & NEUROLOGY

## 2022-08-17 PROCEDURE — 3066F PR DOCUMENTATION OF TREATMENT FOR NEPHROPATHY: ICD-10-PCS | Mod: CPTII,S$GLB,, | Performed by: PSYCHIATRY & NEUROLOGY

## 2022-08-17 PROCEDURE — 99999 PR PBB SHADOW E&M-EST. PATIENT-LVL II: ICD-10-PCS | Mod: PBBFAC,,, | Performed by: PSYCHIATRY & NEUROLOGY

## 2022-08-17 PROCEDURE — 99999 PR PBB SHADOW E&M-EST. PATIENT-LVL II: CPT | Mod: PBBFAC,,, | Performed by: PSYCHIATRY & NEUROLOGY

## 2022-08-17 PROCEDURE — 3051F PR MOST RECENT HEMOGLOBIN A1C LEVEL 7.0 - < 8.0%: ICD-10-PCS | Mod: CPTII,S$GLB,, | Performed by: PSYCHIATRY & NEUROLOGY

## 2022-08-17 PROCEDURE — 99499 RISK ADDL DX/OHS AUDIT: ICD-10-PCS | Mod: S$GLB,,, | Performed by: PSYCHIATRY & NEUROLOGY

## 2022-08-17 PROCEDURE — 3075F SYST BP GE 130 - 139MM HG: CPT | Mod: CPTII,S$GLB,, | Performed by: PSYCHIATRY & NEUROLOGY

## 2022-08-17 PROCEDURE — 4010F ACE/ARB THERAPY RXD/TAKEN: CPT | Mod: CPTII,S$GLB,, | Performed by: PSYCHIATRY & NEUROLOGY

## 2022-08-17 PROCEDURE — 99205 OFFICE O/P NEW HI 60 MIN: CPT | Mod: S$GLB,,, | Performed by: PSYCHIATRY & NEUROLOGY

## 2022-08-17 RX ORDER — SERTRALINE HYDROCHLORIDE 100 MG/1
150 TABLET, FILM COATED ORAL DAILY
Qty: 45 TABLET | Refills: 2 | Status: SHIPPED | OUTPATIENT
Start: 2022-08-17 | End: 2023-01-25 | Stop reason: SDUPTHER

## 2022-08-17 NOTE — PATIENT INSTRUCTIONS
PLAN:     FOLLOW UP    9/14/2022 12:00 PM ESTABLISHED PATIENT - [facility] - Psychiatry Mario Alberto Pierre MD     Offered tho politely decline Social work ; knows can re request FI desires    Meds: advance Zoloft to 150 mg daily (via 1 and 1/2 tab of sertraline (zoloft) 100 mg tab    References:     Relaxation stress reduction workbook: MANI Nelson PhD ( used: $7-10)    Feeling Good Website: Mario Alberto Broussard MD / [email or web address] website (free) / [email or web address]. [employer]CAST[employer]    Anxiety &  phobia workbook by FRANK Vazquez PhD  (web retailers: used: $ 7-10)    [place]: Path to Better Sleep : https://www.veterantraining.[place].gov/insomnia/ (free)       Pt expressed appreciation for the visit today and did not have further question at this time though pt  was still informed to:     Call  if problems.    Call / Report Side Effects to Psyc MD     Encouraged to follow up with primary care / Gen Med MD for continued monitoring of general health and wellness.    Understanding was expressed; and no further concerns nor questions were raised at this time.     remember healthy self care:   eat right  attempt adequate rest   HANDWASHING / encourage such kerri. During this corona virus time   walk or light exercise within reason and as your general med team approves  read or explore any of reference materials / homework mentioned  reach out (I.e.,  connect with)  others who nuture and bring out best in you  avoid risky behaviors  keep your appointments  IF you  cannot make your appt THEN please call or go online to reschedule.  avoid  alcohol and illicit substances.  Look for the positive.  All is often relative-seek balance  Call sooner if needed : [phone]   Call 911 or go to Emergency Room  (ER)  if any acute concerns     Discussed IF zoloft not work ; may look at Paxil or Remeron or other

## 2022-08-17 NOTE — PROGRESS NOTES
PSYCHIATRIC EVALUATION     Disclaimer: Evaluation and treatment is based on information presented to date. Any new information may affect assessment and findings.     Name: Krysta Manuel  Age: 67 y.o.  : 1955    Note:Face to Face time with patient: 75 minutes of total time spent on the encounter, which includes face to face time and non-face to face time preparing to see the patient including but not limited to: 1) Obtaining and/or reviewing separately obtained history, 2) Documenting clinical information in the electronic or other health record, 3) Independently  reviewing / interpreting results as clinically indicated ; 4) discussing medication options including risks and benefits as well as 5) recommendations  for therapeutic interventions and 5) where appropriate additional educational resources (ex: reference books / websites); 6) communicating assessment and plan of care to the patient/family/caregiver  7) explaining importance of keeping appts ; and 8) rescheduling IF miss appt  IF acute concerns to call 911 or go to nearest ER.     Referred by: (Whose idea to come see Psychiatry) : [facility] for depression     Accompanied by: daughter Kamille Manuel ; they live together     CHIEF COMPLAINT :  History stroke / referred for depression     HISTORY:         Krysta Manuel a 67 y.o.  female presents today as referred by  [facility] for depression; both say that the depression began after her stroke about 12 y ago; daughter / pt says that her depression began after stroke tho both say that her depression worse over last 6 months or so    When asked IF any correlation to any stressor : daughter mentions some physical concerns: such as more mumbling ; as well as some GI symptoms such as stomach    Pt denies receiving mental health anywhere prior to [facility] PCP.    Pt has been on Zoloft 100 mg a day    In house pt noah and daughter child Teddy (14yr)    I  "ask IF pt seeing neurology ; pt / daughter say "No." D Post  [facility]PO notes that in Oct 2021 a consult was placed by [facility] Andrea Brown MD THO pt never made it to such appt. Sister says maybe pt sister did not get set up for her.    Daughter describes some difference in care plan of how handled and asked for change PCP provider and moved to [facility]     Daughter says she (herself, daughter) had brain tumor and she (daughter) had grand mal seizures x 25y tho those seizure stopped 2 yrs ago; says when she (daughter) had brain tumor removed (at [facility]  seizures went away.     Daughter is caretaker for pt ; daughter had been working as  at [employer]    No psychosis / no SI / no HI no prior atempts of self harm nor harm to others    No abuse history / no phys no sexual abuse    She was one of 19 children  All from same parents / dad was [employer] man / mom homemaker ; pt herself worked as  over 12 yrs at [employer] [id number] in [place]     Prior MH Treatment  (ex: inpatient / outpatient): per pt / daughter / only [facility] PCP ; denies other formal MH    Prior  Behavioral health providers / diagnosis: Medication: zolfot 100 mg by PCP VINNY Cook MD    GAD7 8/17/2022   1. Feeling nervous, anxious, or on edge? 1   2. Not being able to stop or control worrying? 2   3. Worrying too much about different things? 2   4. Trouble relaxing? 1   5. Being so restless that it is hard to sit still? 1   6. Becoming easily annoyed or irritable? 1   7. Feeling afraid as if something awful might happen? 1   8. If you checked off any problems, how difficult have these problems made it for you to do your work, take care of things at home, or get along with other people? 1   DOREEN-7 Score 9      Depression Patient Health Questionnaire 8/17/2022 9/28/2021   Over the last two weeks how often have you been bothered by little interest or pleasure in doing things Several days Several days   Over the last two weeks " how often have you been bothered by feeling down, depressed or hopeless More than half the days Several days   PHQ-2 Total Score 3 2   Over the last two weeks how often have you been bothered by trouble falling or staying asleep, or sleeping too much More than half the days More than half the days   Over the last two weeks how often have you been bothered by feeling tired or having little energy Several days Several days   Over the last two weeks how often have you been bothered by a poor appetite or overeating Several days Not at all   Over the last two weeks how often have you been bothered by feeling bad about yourself - or that you are a failure or have let yourself or your family down Several days Not at all   Over the last two weeks how often have you been bothered by trouble concentrating on things, such as reading the newspaper or watching television More than half the days Several days   Over the last two weeks how often have you been bothered by moving or speaking so slowly that other people could have noticed. Or the opposite - being so fidgety or restless that you have been moving around a lot more than usual. More than half the days Not at all   Over the last two weeks how often have you been bothered by thoughts that you would be better off dead, or of hurting yourself Not at all Not at all   If you checked off any problems, how difficult have these problems made it for you to do your work, take care of things at home or get along with other people? Somewhat difficult Not difficult at all   Total Score 12 6   Interpretation Moderate Mild     No evidence of robyn     Physical Abuse: N  Sexual abuse  N    Psychosis: denies / none overt      Substance Use:    Alcohol: n  Cannabis n  Tobacco: no  never      Cocaine: n  Benzo:n  Opioid: n  Ecstasy:n  Other:n    PAST PSYCHIATRIC HISTORY:     Inpatient: none  Outpatient: (incl. Primary Care):  Only [facility] and now A Joey ALVAREZ and DANYELL Harrell  PA    Allergy Review:   Review of patient's allergies indicates:  No Known Allergies     Medical Problem List:   Patient Active Problem List   Diagnosis    CVA (cerebral vascular accident)    History of stroke    Vitamin D deficiency    Hypothyroidism    Stroke    Insomnia    Hyperlipidemia    Hypertension    Heart disease    DM II (diabetes mellitus, type II), controlled    Depression    Arthritis    Type II diabetes mellitus with peripheral circulatory disorder    Class 2 severe obesity due to excess calories with serious comorbidity and body mass index (BMI) of 38.0 to 38.9 in adult    Major depression in remission    Hemiparesis affecting left side as late effect of cerebrovascular accident    Dementia without behavioral disturbance    BMI 36.0-36.9,adult    Depression, major, recurrent, moderate    Anxiety        Past Surgical History:   Procedure Laterality Date    COLONOSCOPY      HYSTERECTOMY      THYROIDECTOMY      TOTAL ABDOMINAL HYSTERECTOMY W/ BILATERAL SALPINGOOPHORECTOMY          Other (medical) :  Head Injury: n  Seizure: n  Diabetes y  HTN  y  Other: history CAV stroke 1 y ago     Family History:  Family History   Problem Relation Age of Onset    Hypertension Mother     Diabetes Sister         Mental Status Exam:   Appearance: casual / overweight red shirt pants / red pants / apttern include some black stars   Oriented: x 3 / including: Date: Aug 17 2022; and aware that at: [facility] [place],   Attitude: cooperative engaging pleasant   Eye Contact: good   Behavior: calm here   Mood: says depressed th pretty good today  Cognition: alert / 20-3: 17, 14, 11, 8, 5 ,2   Concentration: grossly intact   Affect: appropriate range   Anxiety: mild / mod  Thought Process: goal directed   Speech: Volume : WNL   Quantity WNL   Quality: appears to openly answer questions   Eye Contact: good   Insight: fair to good   Threats: no SI / no HI   Memory: grossly  intact to personal info  "tho struggled with info (re: presidents : Current Pres?  Not know          Prior Pres? Obama prompt / try again: Trump  Psychosis: denies all   Estimate of Intellectual Function: average   Judgment (to simple situation): if saw a house on fire / A: call help / fire dept  Impulse Control: no history SI / nor HI ; calm here     3 wishes? : be healthy / closer with children / no third anser     PSYCHO-SOCIAL DEVELOPMENT HISTORY:   Social History     Socioeconomic History    Marital status:    Tobacco Use    Smoking status: Never Smoker    Smokeless tobacco: Never Used   Substance and Sexual Activity    Alcohol use: Not Currently        City Born: [facility]     Siblings (full or half)  Brothers: 2 alive / 5 passed  Sisters: 6 sister 4 sister passed    FULL siblings lost count / says had 19 all together     Parents :   / mom passed dealing with stomach / dad passed compliaction diabetes    Briefly Describe  your Mom: she wasgood  Briefly Describe your Dad took care of us good    Bio Mom: Occupation: homemaker  Bio Dad:  Occupation: was [employer]    Marital Status: once [ Carlos Manuel]  3 y ago / complication of drinking / 63 y     : descrived as "good"    Children   Girls  (ages)  :2  Boys (ages):  2    Education: 9 th grade [employer]     Hoahaoism / Spiritual: Confucianism    Legal Issues?  n  DWI ?n  FPC time? 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 Yes  Now    Assessment:     Encounter Diagnoses   Name Primary?    Depression, major, recurrent, moderate Yes    History of stroke (pt reports CVA about )     Anxiety     Type II diabetes mellitus with peripheral circulatory disorder     Hemiparesis affecting left side as late effect of cerebrovascular accident         Current Outpatient Medications:     aspirin (ECOTRIN) 81 MG " EC tablet, Take 1 tablet (81 mg total) by mouth once daily., Disp: 90 tablet, Rfl: 1    blood sugar diagnostic Strp, To use 3 times daily, Disp: 200 strip, Rfl: 10    carvediloL (COREG) 25 MG tablet, Take 25 mg by mouth 2 (two) times daily., Disp: , Rfl:     clopidogreL (PLAVIX) 75 mg tablet, Take 75 mg by mouth once daily., Disp: , Rfl:     diltiaZEM (DILACOR XR) 240 MG CDCR, Take 240 mg by mouth once daily., Disp: , Rfl:     EASY TOUCH LANCING DEVICE Misc, , Disp: , Rfl:     fluticasone propionate (FLONASE) 50 mcg/actuation nasal spray, 2 SPRAYS IN EACH NOSTRIL EVERY DAY AS NEEDED, Disp: 16 g, Rfl: 1    glipiZIDE (GLUCOTROL) 10 MG tablet, Take 1 tablet (10 mg total) by mouth 2 (two) times daily before meals., Disp: 90 tablet, Rfl: 1    hydroCHLOROthiazide (HYDRODIURIL) 25 MG tablet, Take 1 tablet (25 mg total) by mouth once daily., Disp: 30 tablet, Rfl: 3    HYDROcodone-acetaminophen (NORCO)  mg per tablet, , Disp: , Rfl: 0    irbesartan (AVAPRO) 300 MG tablet, Take 1 tablet (300 mg total) by mouth every evening., Disp: 30 tablet, Rfl: 3    lancing device with lancets Kit, 1 each by Misc.(Non-Drug; Combo Route) route 3 (three) times daily., Disp: 200 each, Rfl: 10    LANTUS SOLOSTAR U-100 INSULIN glargine 100 units/mL SubQ pen, Inject 60 Units into the skin every evening., Disp: 30 mL, Rfl: 3    levothyroxine (SYNTHROID) 125 MCG tablet, Take 1 tablet (125 mcg total) by mouth before breakfast., Disp: 30 tablet, Rfl: 11    memantine (NAMENDA) 10 MG Tab, Take 1 tablet (10 mg total) by mouth 2 (two) times daily., Disp: 180 tablet, Rfl: 2    metFORMIN (GLUCOPHAGE-XR) 500 MG ER 24hr tablet, Take 2 tablets (1,000 mg total) by mouth 2 (two) times daily with meals., Disp: 360 tablet, Rfl: 1    ondansetron (ZOFRAN-ODT) 4 MG TbDL, Take 4 mg by mouth every 8 (eight) hours as needed., Disp: , Rfl:     rosuvastatin (CRESTOR) 10 MG tablet, Take 1 tablet (10 mg total) by mouth nightly., Disp: 90 tablet,  "Rfl: 1    sertraline (ZOLOFT) 100 MG tablet, Take 1.5 tablets (150 mg total) by mouth once daily., Disp: 45 tablet, Rfl: 2    SURE COMFORT PEN NEEDLE 32 gauge x 5/32" Ndle, , Disp: , Rfl:     zolpidem (AMBIEN) 10 mg Tab, TAKE ONE TABLET BY MOUTH AT BEDTIME AS NEEDED MAY CAUSE DROWSINESS, USE CAUTION FOR SLEEP, Disp: , Rfl:     Patient Instructions       PLAN:     FOLLOW UP    9/14/2022 12:00 PM ESTABLISHED PATIENT - [facility] - Psychiatry Mario Alberto Pierre MD     Offered tho politely decline Social work ; knows can re request FI desires    Meds: advance Zoloft to 150 mg daily (via 1 and 1/2 tab of sertraline (zoloft) 100 mg tab    References:     Relaxation stress reduction workbook: MANI Nelson PhD ( used: $7-10)    Feeling Good Website: Mario Alberto Broussard MD / www.[employer] website (free) / kerri. PODCASTS    Anxiety &  phobia workbook by FRANK Vzaquez PhD  (web retailers: used: $ 7-10)    [place]: Path to Better Sleep : https://www.veterantraining.va.gov/insomnia/ (free)       Pt expressed appreciation for the visit today and did not have further question at this time though pt  was still informed to:     Call  if problems.    Call / Report Side Effects to Psyc MD     Encouraged to follow up with primary care / Gen Med MD for continued monitoring of general health and wellness.    Understanding was expressed; and no further concerns nor questions were raised at this time.     remember healthy self care:   eat right  attempt adequate rest   HANDWASHING / encourage such kerri. During this corona virus time   walk or light exercise within reason and as your general med team approves  read or explore any of reference materials / homework mentioned  reach out (I.e.,  connect with)  others who nuture and bring out best in you  avoid risky behaviors  keep your appointments  IF you  cannot make your appt THEN please call or go online to reschedule.  avoid  alcohol and illicit substances.  Look for the positive.  All is " often relative-seek balance  Call sooner if needed : [phone]   Call 911 or go to Emergency Room  (ER)  if any acute concerns     Discussed IF zoloft not work ; may look at Paxil or Remeron or other

## 2022-08-23 RX ORDER — MEMANTINE HYDROCHLORIDE 10 MG/1
10 TABLET ORAL 2 TIMES DAILY
Qty: 180 TABLET | Refills: 2 | Status: CANCELLED | OUTPATIENT
Start: 2022-08-23

## 2022-08-23 NOTE — TELEPHONE ENCOUNTER
----- Message from Dennise Nelson sent at 8/23/2022 10:54 AM CDT -----  Contact: Mireille  Type:  RX Refill Request    Who Called: Mireille  Refill or New Rx:refill  RX Name and Strength:memantine (NAMENDA) 10   How is the patient currently taking it? (ex. 1XDay): two times a day  Is this a 30 day or 90 day RX:90 day   Preferred Pharm[place] with phone number[place]  [facility] [place] [address] [facility]  [address]  [place] [address]  Phone: [phone] Fax: [phone]   Local or Mail Order:Local  Ordering Provider:Joey  Would the patient rather a call back or a response via [facility]? Call back  Best Call Back Number:[phone]  Additional Information:           Thanks  CF

## 2022-08-24 NOTE — TELEPHONE ENCOUNTER
----- Message from Joy Peña sent at 8/24/2022  1:05 PM CDT -----  Contact: Austin/daughter  .Type:  RX Refill Request    Who Called:Austin/daughter  Refill or New Rx:refill  RX Name and Strength:memantine (NAMENDA) 10 MG Tab  How is the patient currently taking it? (ex. 1XDay):as prescribed   Is this a 30 day or 90 day RX:90  [place] with phone number[place] [place] [address]   [address]  [place] [address]  Phone: [phone] Fax: [phone]  Local or Mail Order:local  Ordering Provider:Dr. Cook  Would the patient rather a call back or a response via [facility]? call  Best Call Back Number:[phone]  Additional Information: Daughter stating that patient been trying to get medication since last Friday 8/19/2022 and have not received an response.  Thanks  LR

## 2022-08-24 NOTE — TELEPHONE ENCOUNTER
Daughter is calling to check the status of refill. States she's been calling since Friday for a refill.

## 2022-08-25 NOTE — TELEPHONE ENCOUNTER
Spoke with patient daughter advised that namenda rx has be routed to Dr. Cook for approval. Voiced understanding.

## 2022-08-25 NOTE — TELEPHONE ENCOUNTER
----- Message from Amaya Blake sent at 8/25/2022 11:48 AM CDT -----  Contact: [phone]  Kamille Is requesting a call in regards to pt refill. She stated that she has called several time to get an refill for pts memantine (NAMENDA) 10 MG Tab. Please call her back at [phone]. Thanks KB

## 2022-08-25 NOTE — TELEPHONE ENCOUNTER
Spoke with patient daughter and advise that Dr. Cook will discuss namenda prescription. Daughter voiced understanding.

## 2022-08-25 NOTE — TELEPHONE ENCOUNTER
Memantine is a medication for dementia. Has she been diagnosed with dementia? I do not see it on her chart as a diagnosis.

## 2022-08-26 ENCOUNTER — OFFICE VISIT (OUTPATIENT)
Dept: INTERNAL MEDICINE | Facility: CLINIC | Age: 67
End: 2022-08-26
Payer: MEDICARE

## 2022-08-26 VITALS
HEART RATE: 84 BPM | BODY MASS INDEX: 36.83 KG/M2 | TEMPERATURE: 97 F | OXYGEN SATURATION: 95 % | DIASTOLIC BLOOD PRESSURE: 72 MMHG | WEIGHT: 207.88 LBS | HEIGHT: 63 IN | SYSTOLIC BLOOD PRESSURE: 122 MMHG

## 2022-08-26 DIAGNOSIS — E66.01 SEVERE OBESITY (BMI 35.0-39.9) WITH COMORBIDITY: ICD-10-CM

## 2022-08-26 DIAGNOSIS — I10 HYPERTENSION GOAL BP (BLOOD PRESSURE) < 130/80: Primary | ICD-10-CM

## 2022-08-26 DIAGNOSIS — F03.90 DEMENTIA WITHOUT BEHAVIORAL DISTURBANCE, UNSPECIFIED DEMENTIA TYPE: ICD-10-CM

## 2022-08-26 DIAGNOSIS — E78.5 HYPERLIPIDEMIA LDL GOAL <70: ICD-10-CM

## 2022-08-26 DIAGNOSIS — Z79.4 UNCONTROLLED TYPE 2 DIABETES MELLITUS WITH HYPERGLYCEMIA, WITH LONG-TERM CURRENT USE OF INSULIN: ICD-10-CM

## 2022-08-26 DIAGNOSIS — E11.65 UNCONTROLLED TYPE 2 DIABETES MELLITUS WITH HYPERGLYCEMIA, WITH LONG-TERM CURRENT USE OF INSULIN: ICD-10-CM

## 2022-08-26 DIAGNOSIS — E03.9 HYPOTHYROIDISM (ACQUIRED): ICD-10-CM

## 2022-08-26 PROCEDURE — 3066F NEPHROPATHY DOC TX: CPT | Mod: CPTII,S$GLB,, | Performed by: INTERNAL MEDICINE

## 2022-08-26 PROCEDURE — 99999 PR PBB SHADOW E&M-EST. PATIENT-LVL IV: ICD-10-PCS | Mod: PBBFAC,,, | Performed by: INTERNAL MEDICINE

## 2022-08-26 PROCEDURE — 3051F HG A1C>EQUAL 7.0%<8.0%: CPT | Mod: CPTII,S$GLB,, | Performed by: INTERNAL MEDICINE

## 2022-08-26 PROCEDURE — 3051F PR MOST RECENT HEMOGLOBIN A1C LEVEL 7.0 - < 8.0%: ICD-10-PCS | Mod: CPTII,S$GLB,, | Performed by: INTERNAL MEDICINE

## 2022-08-26 PROCEDURE — 3008F PR BODY MASS INDEX (BMI) DOCUMENTED: ICD-10-PCS | Mod: CPTII,S$GLB,, | Performed by: INTERNAL MEDICINE

## 2022-08-26 PROCEDURE — 1101F PR PT FALLS ASSESS DOC 0-1 FALLS W/OUT INJ PAST YR: ICD-10-PCS | Mod: CPTII,S$GLB,, | Performed by: INTERNAL MEDICINE

## 2022-08-26 PROCEDURE — 1159F PR MEDICATION LIST DOCUMENTED IN MEDICAL RECORD: ICD-10-PCS | Mod: CPTII,S$GLB,, | Performed by: INTERNAL MEDICINE

## 2022-08-26 PROCEDURE — 3288F FALL RISK ASSESSMENT DOCD: CPT | Mod: CPTII,S$GLB,, | Performed by: INTERNAL MEDICINE

## 2022-08-26 PROCEDURE — 4010F PR ACE/ARB THEARPY RXD/TAKEN: ICD-10-PCS | Mod: CPTII,S$GLB,, | Performed by: INTERNAL MEDICINE

## 2022-08-26 PROCEDURE — 1160F PR REVIEW ALL MEDS BY PRESCRIBER/CLIN PHARMACIST DOCUMENTED: ICD-10-PCS | Mod: CPTII,S$GLB,, | Performed by: INTERNAL MEDICINE

## 2022-08-26 PROCEDURE — 99499 UNLISTED E&M SERVICE: CPT | Mod: S$GLB,,, | Performed by: INTERNAL MEDICINE

## 2022-08-26 PROCEDURE — 3074F SYST BP LT 130 MM HG: CPT | Mod: CPTII,S$GLB,, | Performed by: INTERNAL MEDICINE

## 2022-08-26 PROCEDURE — 1101F PT FALLS ASSESS-DOCD LE1/YR: CPT | Mod: CPTII,S$GLB,, | Performed by: INTERNAL MEDICINE

## 2022-08-26 PROCEDURE — 3061F PR NEG MICROALBUMINURIA RESULT DOCUMENTED/REVIEW: ICD-10-PCS | Mod: CPTII,S$GLB,, | Performed by: INTERNAL MEDICINE

## 2022-08-26 PROCEDURE — 99499 RISK ADDL DX/OHS AUDIT: ICD-10-PCS | Mod: S$GLB,,, | Performed by: INTERNAL MEDICINE

## 2022-08-26 PROCEDURE — 3078F PR MOST RECENT DIASTOLIC BLOOD PRESSURE < 80 MM HG: ICD-10-PCS | Mod: CPTII,S$GLB,, | Performed by: INTERNAL MEDICINE

## 2022-08-26 PROCEDURE — 99214 OFFICE O/P EST MOD 30 MIN: CPT | Mod: S$GLB,,, | Performed by: INTERNAL MEDICINE

## 2022-08-26 PROCEDURE — 99214 PR OFFICE/OUTPT VISIT, EST, LEVL IV, 30-39 MIN: ICD-10-PCS | Mod: S$GLB,,, | Performed by: INTERNAL MEDICINE

## 2022-08-26 PROCEDURE — 4010F ACE/ARB THERAPY RXD/TAKEN: CPT | Mod: CPTII,S$GLB,, | Performed by: INTERNAL MEDICINE

## 2022-08-26 PROCEDURE — 3008F BODY MASS INDEX DOCD: CPT | Mod: CPTII,S$GLB,, | Performed by: INTERNAL MEDICINE

## 2022-08-26 PROCEDURE — 3066F PR DOCUMENTATION OF TREATMENT FOR NEPHROPATHY: ICD-10-PCS | Mod: CPTII,S$GLB,, | Performed by: INTERNAL MEDICINE

## 2022-08-26 PROCEDURE — 3074F PR MOST RECENT SYSTOLIC BLOOD PRESSURE < 130 MM HG: ICD-10-PCS | Mod: CPTII,S$GLB,, | Performed by: INTERNAL MEDICINE

## 2022-08-26 PROCEDURE — 1126F AMNT PAIN NOTED NONE PRSNT: CPT | Mod: CPTII,S$GLB,, | Performed by: INTERNAL MEDICINE

## 2022-08-26 PROCEDURE — 3061F NEG MICROALBUMINURIA REV: CPT | Mod: CPTII,S$GLB,, | Performed by: INTERNAL MEDICINE

## 2022-08-26 PROCEDURE — 99999 PR PBB SHADOW E&M-EST. PATIENT-LVL IV: CPT | Mod: PBBFAC,,, | Performed by: INTERNAL MEDICINE

## 2022-08-26 PROCEDURE — 3078F DIAST BP <80 MM HG: CPT | Mod: CPTII,S$GLB,, | Performed by: INTERNAL MEDICINE

## 2022-08-26 PROCEDURE — 1126F PR PAIN SEVERITY QUANTIFIED, NO PAIN PRESENT: ICD-10-PCS | Mod: CPTII,S$GLB,, | Performed by: INTERNAL MEDICINE

## 2022-08-26 PROCEDURE — 3288F PR FALLS RISK ASSESSMENT DOCUMENTED: ICD-10-PCS | Mod: CPTII,S$GLB,, | Performed by: INTERNAL MEDICINE

## 2022-08-26 PROCEDURE — 1159F MED LIST DOCD IN RCRD: CPT | Mod: CPTII,S$GLB,, | Performed by: INTERNAL MEDICINE

## 2022-08-26 PROCEDURE — 1160F RVW MEDS BY RX/DR IN RCRD: CPT | Mod: CPTII,S$GLB,, | Performed by: INTERNAL MEDICINE

## 2022-08-26 RX ORDER — ROSUVASTATIN CALCIUM 10 MG/1
10 TABLET, COATED ORAL NIGHTLY
Qty: 90 TABLET | Refills: 1 | Status: SHIPPED | OUTPATIENT
Start: 2022-08-26 | End: 2022-11-16 | Stop reason: SDUPTHER

## 2022-08-26 RX ORDER — MEMANTINE HYDROCHLORIDE 10 MG/1
10 TABLET ORAL 2 TIMES DAILY
Qty: 180 TABLET | Refills: 1 | Status: SHIPPED | OUTPATIENT
Start: 2022-08-26 | End: 2022-11-16 | Stop reason: SDUPTHER

## 2022-08-26 RX ORDER — METFORMIN HYDROCHLORIDE 500 MG/1
1000 TABLET, EXTENDED RELEASE ORAL 2 TIMES DAILY WITH MEALS
Qty: 360 TABLET | Refills: 1 | Status: SHIPPED | OUTPATIENT
Start: 2022-08-26 | End: 2023-04-19

## 2022-08-26 NOTE — TELEPHONE ENCOUNTER
----- Message from Jona Carlson LPN sent at 8/25/2022  5:29 PM CDT -----    ----- Message -----  From: Mariana Estrada  Sent: 8/25/2022   2:50 PM CDT  To: [facility] Staff    Please call pt daughter Kamille @ [phone] regarding pt Namenda medication, need to know why pt meds was denied.

## 2022-08-26 NOTE — PROGRESS NOTES
Krysta Manuel  67 y.o. Black or  female  [id number]    Chief Complaint:  Chief Complaint   Patient presents with    Follow-up       History of Present Illness:  Here with her daughter for follow up.   HTN--stable on current medication.   HLD--needs refills on rosuvastatin.  Lab Results   Component Value Date    LDLCALC 42.0 (L) 07/18/2022     DM--slightly improved. Admits to not taking Lantus as prescribed. She is compliant with metformin and glipizide. She needs refills on metformin. [facility] checks her glucoses regularly and they have mostly been high. She is under the care of diabetes management and has an upcoming appointment.   Hypothyroidism--compliant with levothyroxine.   Dementia--diagnosed by previous PCP. She has been referred to neurology by her psychiatrist but does not have an appointment yet. She is compliant with memantine. She needs refills.     Laboratory:  Lab Results   Component Value Date    WBC 10.1 12/29/2021    HGB 11.7 12/29/2021    HCT 36.4 12/29/2021     12/29/2021    CHOL 90 (L) 07/18/2022    TRIG 60 07/18/2022    HDL 36 (L) 07/18/2022    ALT 21 07/18/2022    AST 17 07/18/2022     07/18/2022    K 4.6 07/18/2022     07/18/2022    CREATININE 1.0 07/18/2022    BUN 25 (H) 07/18/2022    CO2 23 07/18/2022    TSH 2.804 07/18/2022    HGBA1C 7.9 (H) 07/18/2022       History:  Past Medical History:   Diagnosis Date    Arthritis     Depression     DM II (diabetes mellitus, type II), controlled 12 years    Heart disease     HTN (hypertension)     Hyperlipidemia     Hypothyroidism     Insomnia     Stroke 2010    Vitamin D deficiency        Medications:  Current Outpatient Medications on File Prior to Visit   Medication Sig Dispense Refill    aspirin (ECOTRIN) 81 MG EC tablet Take 1 tablet (81 mg total) by mouth once daily. 90 tablet 1    blood sugar diagnostic Strp To use 3 times daily 200 strip 10    carvediloL (COREG) 25 MG tablet Take 25 mg by mouth 2  "(two) times daily.      clopidogreL (PLAVIX) 75 mg tablet Take 75 mg by mouth once daily.      diltiaZEM (DILACOR XR) 240 MG CDCR Take 240 mg by mouth once daily.      EASY TOUCH LANCING DEVICE Misc       fluticasone propionate (FLONASE) 50 mcg/actuation nasal spray 2 SPRAYS IN EACH NOSTRIL EVERY DAY AS NEEDED 16 g 1    glipiZIDE (GLUCOTROL) 10 MG tablet Take 1 tablet (10 mg total) by mouth 2 (two) times daily before meals. 90 tablet 1    hydroCHLOROthiazide (HYDRODIURIL) 25 MG tablet Take 1 tablet (25 mg total) by mouth once daily. 30 tablet 3    HYDROcodone-acetaminophen (NORCO)  mg per tablet   0    irbesartan (AVAPRO) 300 MG tablet Take 1 tablet (300 mg total) by mouth every evening. 30 tablet 3    LANTUS SOLOSTAR U-100 INSULIN glargine 100 units/mL SubQ pen Inject 60 Units into the skin every evening. 30 mL 3    levothyroxine (SYNTHROID) 125 MCG tablet Take 1 tablet (125 mcg total) by mouth before breakfast. 30 tablet 11    ondansetron (ZOFRAN-ODT) 4 MG TbDL Take 4 mg by mouth every 8 (eight) hours as needed.      sertraline (ZOLOFT) 100 MG tablet Take 1.5 tablets (150 mg total) by mouth once daily. 45 tablet 2    SURE COMFORT PEN NEEDLE 32 gauge x 5/32" Ndle       zolpidem (AMBIEN) 10 mg Tab TAKE ONE TABLET BY MOUTH AT BEDTIME AS NEEDED MAY CAUSE DROWSINESS, USE CAUTION FOR SLEEP      memantine (NAMENDA) 10 MG Tab Take 1 tablet (10 mg total) by mouth 2 (two) times daily. 180 tablet 2    metFORMIN (GLUCOPHAGE-XR) 500 MG ER 24hr tablet Take 2 tablets (1,000 mg total) by mouth 2 (two) times daily with meals. 360 tablet 1    rosuvastatin (CRESTOR) 10 MG tablet Take 1 tablet (10 mg total) by mouth nightly. 90 tablet 1    lancing device with lancets Kit 1 each by Misc.(Non-Drug; Combo Route) route 3 (three) times daily. 200 each 10     No current facility-administered medications on file prior to visit.       Allergies:  Review of patient's allergies indicates:  No Known Allergies    Review " of Systems   Constitutional: Negative for weight loss.   Eyes: Negative for blurred vision.   Respiratory: Negative for shortness of breath.    Cardiovascular: Negative for chest pain.   Neurological: Negative for dizziness, tingling and headaches.   Psychiatric/Behavioral: Positive for depression and memory loss. The patient is nervous/anxious.        Exam:  Vitals:    08/26/22 0926   BP: 122/72   Pulse: 84   Temp: 96.6 °F (35.9 °C)     Weight: 94.3 kg (207 lb 14.3 oz)   Body mass index is 36.83 kg/m².      Physical Exam  Vitals reviewed.   Constitutional:       General: She is not in acute distress.     Appearance: She is well-developed. She is not ill-appearing.   Eyes:      General: No scleral icterus.  Cardiovascular:      Rate and Rhythm: Normal rate and regular rhythm.      Heart sounds: Normal heart sounds.   Pulmonary:      Effort: Pulmonary effort is normal. No respiratory distress.      Breath sounds: Normal breath sounds.   Skin:     General: Skin is warm and dry.   Neurological:      Mental Status: She is alert and oriented to person, place, and time.   Psychiatric:         Behavior: Behavior normal.       Assessment:  The primary encounter diagnosis was Hypertension goal BP (blood pressure) < 130/80. Diagnoses of Hyperlipidemia LDL goal <70, Uncontrolled type 2 diabetes mellitus with hyperglycemia, with long-term current use of insulin, Hypothyroidism (acquired), and Dementia without behavioral disturbance, unspecified dementia type were also pertinent to this visit.    Plan:  Hypertension goal BP (blood pressure) < 130/80  -     Continue HCTZ, irbesartan and carvedilol    Hyperlipidemia LDL goal <70  -     Refill rosuvastatin (CRESTOR) 10 MG tablet; Take 1 tablet (10 mg total) by mouth nightly.  Dispense: 90 tablet; Refill: 1    Uncontrolled type 2 diabetes mellitus with hyperglycemia, with long-term current use of insulin  -     Refill metFORMIN (GLUCOPHAGE-XR) 500 MG ER 24hr tablet; Take 2 tablets  (1,000 mg total) by mouth 2 (two) times daily with meals.  Dispense: 360 tablet; Refill: 1  -     Counseled regarding medication compliance   -     F/U with diabetes management     Hypothyroidism (acquired)  -     Continue levothyroxine    Dementia without behavioral disturbance, unspecified dementia type  -     Refill memantine (NAMENDA) 10 MG Tab; Take 1 tablet (10 mg total) by mouth 2 (two) times daily.  Dispense: 180 tablet; Refill: 1      Follow up in about 3 months (around 11/26/2022).

## 2022-08-30 RX ORDER — MEMANTINE HYDROCHLORIDE 10 MG/1
10 TABLET ORAL 2 TIMES DAILY
Qty: 180 TABLET | Refills: 2 | OUTPATIENT
Start: 2022-08-30

## 2022-09-06 ENCOUNTER — OFFICE VISIT (OUTPATIENT)
Dept: DIABETES | Facility: CLINIC | Age: 67
End: 2022-09-06
Payer: MEDICARE

## 2022-09-06 VITALS
BODY MASS INDEX: 36.68 KG/M2 | HEIGHT: 63 IN | SYSTOLIC BLOOD PRESSURE: 138 MMHG | WEIGHT: 207 LBS | DIASTOLIC BLOOD PRESSURE: 80 MMHG | HEART RATE: 78 BPM

## 2022-09-06 DIAGNOSIS — E11.51 TYPE II DIABETES MELLITUS WITH PERIPHERAL CIRCULATORY DISORDER: Primary | ICD-10-CM

## 2022-09-06 DIAGNOSIS — E78.5 HYPERLIPIDEMIA, UNSPECIFIED HYPERLIPIDEMIA TYPE: ICD-10-CM

## 2022-09-06 DIAGNOSIS — I10 HYPERTENSION, UNSPECIFIED TYPE: ICD-10-CM

## 2022-09-06 LAB — GLUCOSE SERPL-MCNC: 152 MG/DL (ref 70–110)

## 2022-09-06 PROCEDURE — 82962 GLUCOSE BLOOD TEST: CPT | Mod: S$GLB,,, | Performed by: NURSE PRACTITIONER

## 2022-09-06 PROCEDURE — 1101F PT FALLS ASSESS-DOCD LE1/YR: CPT | Mod: CPTII,S$GLB,, | Performed by: NURSE PRACTITIONER

## 2022-09-06 PROCEDURE — 1160F RVW MEDS BY RX/DR IN RCRD: CPT | Mod: CPTII,S$GLB,, | Performed by: NURSE PRACTITIONER

## 2022-09-06 PROCEDURE — 82962 POCT GLUCOSE, HAND-HELD DEVICE: ICD-10-PCS | Mod: S$GLB,,, | Performed by: NURSE PRACTITIONER

## 2022-09-06 PROCEDURE — 3075F PR MOST RECENT SYSTOLIC BLOOD PRESS GE 130-139MM HG: ICD-10-PCS | Mod: CPTII,S$GLB,, | Performed by: NURSE PRACTITIONER

## 2022-09-06 PROCEDURE — 1126F PR PAIN SEVERITY QUANTIFIED, NO PAIN PRESENT: ICD-10-PCS | Mod: CPTII,S$GLB,, | Performed by: NURSE PRACTITIONER

## 2022-09-06 PROCEDURE — 3061F PR NEG MICROALBUMINURIA RESULT DOCUMENTED/REVIEW: ICD-10-PCS | Mod: CPTII,S$GLB,, | Performed by: NURSE PRACTITIONER

## 2022-09-06 PROCEDURE — 3061F NEG MICROALBUMINURIA REV: CPT | Mod: CPTII,S$GLB,, | Performed by: NURSE PRACTITIONER

## 2022-09-06 PROCEDURE — 3079F DIAST BP 80-89 MM HG: CPT | Mod: CPTII,S$GLB,, | Performed by: NURSE PRACTITIONER

## 2022-09-06 PROCEDURE — 99999 PR PBB SHADOW E&M-EST. PATIENT-LVL IV: ICD-10-PCS | Mod: PBBFAC,,, | Performed by: NURSE PRACTITIONER

## 2022-09-06 PROCEDURE — 3051F HG A1C>EQUAL 7.0%<8.0%: CPT | Mod: CPTII,S$GLB,, | Performed by: NURSE PRACTITIONER

## 2022-09-06 PROCEDURE — 1126F AMNT PAIN NOTED NONE PRSNT: CPT | Mod: CPTII,S$GLB,, | Performed by: NURSE PRACTITIONER

## 2022-09-06 PROCEDURE — 99999 PR PBB SHADOW E&M-EST. PATIENT-LVL IV: CPT | Mod: PBBFAC,,, | Performed by: NURSE PRACTITIONER

## 2022-09-06 PROCEDURE — 3008F BODY MASS INDEX DOCD: CPT | Mod: CPTII,S$GLB,, | Performed by: NURSE PRACTITIONER

## 2022-09-06 PROCEDURE — 1101F PR PT FALLS ASSESS DOC 0-1 FALLS W/OUT INJ PAST YR: ICD-10-PCS | Mod: CPTII,S$GLB,, | Performed by: NURSE PRACTITIONER

## 2022-09-06 PROCEDURE — 3288F PR FALLS RISK ASSESSMENT DOCUMENTED: ICD-10-PCS | Mod: CPTII,S$GLB,, | Performed by: NURSE PRACTITIONER

## 2022-09-06 PROCEDURE — 3051F PR MOST RECENT HEMOGLOBIN A1C LEVEL 7.0 - < 8.0%: ICD-10-PCS | Mod: CPTII,S$GLB,, | Performed by: NURSE PRACTITIONER

## 2022-09-06 PROCEDURE — 1159F PR MEDICATION LIST DOCUMENTED IN MEDICAL RECORD: ICD-10-PCS | Mod: CPTII,S$GLB,, | Performed by: NURSE PRACTITIONER

## 2022-09-06 PROCEDURE — 3075F SYST BP GE 130 - 139MM HG: CPT | Mod: CPTII,S$GLB,, | Performed by: NURSE PRACTITIONER

## 2022-09-06 PROCEDURE — 4010F PR ACE/ARB THEARPY RXD/TAKEN: ICD-10-PCS | Mod: CPTII,S$GLB,, | Performed by: NURSE PRACTITIONER

## 2022-09-06 PROCEDURE — 4010F ACE/ARB THERAPY RXD/TAKEN: CPT | Mod: CPTII,S$GLB,, | Performed by: NURSE PRACTITIONER

## 2022-09-06 PROCEDURE — 3079F PR MOST RECENT DIASTOLIC BLOOD PRESSURE 80-89 MM HG: ICD-10-PCS | Mod: CPTII,S$GLB,, | Performed by: NURSE PRACTITIONER

## 2022-09-06 PROCEDURE — 99214 PR OFFICE/OUTPT VISIT, EST, LEVL IV, 30-39 MIN: ICD-10-PCS | Mod: S$GLB,,, | Performed by: NURSE PRACTITIONER

## 2022-09-06 PROCEDURE — 1159F MED LIST DOCD IN RCRD: CPT | Mod: CPTII,S$GLB,, | Performed by: NURSE PRACTITIONER

## 2022-09-06 PROCEDURE — 3288F FALL RISK ASSESSMENT DOCD: CPT | Mod: CPTII,S$GLB,, | Performed by: NURSE PRACTITIONER

## 2022-09-06 PROCEDURE — 1160F PR REVIEW ALL MEDS BY PRESCRIBER/CLIN PHARMACIST DOCUMENTED: ICD-10-PCS | Mod: CPTII,S$GLB,, | Performed by: NURSE PRACTITIONER

## 2022-09-06 PROCEDURE — 99214 OFFICE O/P EST MOD 30 MIN: CPT | Mod: S$GLB,,, | Performed by: NURSE PRACTITIONER

## 2022-09-06 PROCEDURE — 3066F NEPHROPATHY DOC TX: CPT | Mod: CPTII,S$GLB,, | Performed by: NURSE PRACTITIONER

## 2022-09-06 PROCEDURE — 3008F PR BODY MASS INDEX (BMI) DOCUMENTED: ICD-10-PCS | Mod: CPTII,S$GLB,, | Performed by: NURSE PRACTITIONER

## 2022-09-06 PROCEDURE — 3066F PR DOCUMENTATION OF TREATMENT FOR NEPHROPATHY: ICD-10-PCS | Mod: CPTII,S$GLB,, | Performed by: NURSE PRACTITIONER

## 2022-09-06 RX ORDER — DULAGLUTIDE 3 MG/.5ML
3 INJECTION, SOLUTION SUBCUTANEOUS
Qty: 4 PEN | Refills: 5 | Status: SHIPPED | OUTPATIENT
Start: 2022-09-06 | End: 2022-12-08 | Stop reason: DRUGHIGH

## 2022-09-06 NOTE — PROGRESS NOTES
PCP: Wanda Cook DO    Subjective:     Chief Complaint: Diabetes follow up.  Last visit with me: 7/14/2022    HPI: 66 y.o.  female for diabetes follow up.   Type II diabetes for > 10 years.  Comorbidities: HTN, HLD, CAD, S/p CVA (2010), Left Hemiparesis, Hypothyroidism and Overweight by BMI   Accompanied by daughter during visit.    Family History of Type 2 DM: father  Known diabetes complications:  DKA/HHS: no  Retinopathy: no  Peripheral neuropathy: no  Nephropathy: no    Interval history:  Denies recent hospital admissions or ER visits.   Denies having hypoglycemia.   Endorses diabetes related symptoms: None.    Blood glucose monitoring via fingerstick: 2-3 x/day   Per patient's BG log, over the last 4 weeks   Fasting , 92, 118, 143   Mid afternoon BG readings 145-180   Early evening BG readings range  123- 263 average 160-180   Bedtime 79, 88, 119, 125     Activity level: Sedentary  Meal Planning:3 meals/day;1 snacks/day.    Social history:    - Single, Lives with daughter, grandchild    Medication compliance all of the time, diet compliance most of the time.   To be enrolled in diabetes education classes.     Previous regimen: Humalog 75/25    Past failed treatment: N/A    Current DM Medications:  Diabetes Medications               glipiZIDE (GLUCOTROL) 10 MG tablet Take 1 tablet (10 mg total) by mouth 2 (two) times daily before meals.    Trulicity 3 mg weekly     LANTUS SOLOSTAR U-100 INSULIN glargine 100 units/mL SubQ pen Inject 60 Units into the skin every evening.    metFORMIN (GLUCOPHAGE-XR) 500 MG ER 24hr tablet Take 2 tablets (1,000 mg total) by mouth 2 (two) times daily with meals.       Allergies  Review of patient's allergies indicates:  No Known Allergies    Labs Reviewed:  Her most recent A1C is:  Lab Results   Component Value Date    HGBA1C 7.9 (H) 07/18/2022    HGBA1C 8.2 (H) 04/19/2022    HGBA1C 7.8 12/29/2021       Her most recent BMP is:  Lab Results   Component Value  Date     07/18/2022    K 4.6 07/18/2022     07/18/2022    CO2 23 07/18/2022    BUN 25 (H) 07/18/2022    CREATININE 1.0 07/18/2022    CALCIUM 9.9 07/18/2022    ANIONGAP 10 07/18/2022    ESTGFRAFRICA >60.0 07/18/2022    EGFRNONAA 58.0 (A) 07/18/2022       No results found for: CPEPTIDE  No results found for: GLUTAMICACID    Body mass index is 36.67 kg/m².    Weight gain 0 lbs since last visit.  Wt Readings from Last 3 Encounters:   09/06/22 1433 93.9 kg (207 lb)   08/26/22 0926 94.3 kg (207 lb 14.3 oz)   07/14/22 0902 94.2 kg (207 lb 10.8 oz)        Her blood sugar in clinic today is:  Lab Results   Component Value Date    POCGLU 152 (A) 09/06/2022       Diabetes Management Status  Statin: Taking  ACE/ARB: Taking    Screening or Prevention Patient's value Goal Complete/Controlled?   HgA1C Testing and Control   Lab Results   Component Value Date    HGBA1C 7.9 (H) 07/18/2022      Annually/Less than 8% Yes     Lipid profile : 07/18/2022 Annually Yes     LDL control Lab Results   Component Value Date    LDLCALC 42.0 (L) 07/18/2022    Annually/Less than 100 mg/dl  Yes     Nephropathy screening Lab Results   Component Value Date    MICALBCREAT 4.6 02/03/2022     No results found for: PROTEINUA Annually Yes     Blood pressure BP Readings from Last 1 Encounters:   09/06/22 138/80    Less than 140/90 Yes     Dilated retinal exam : 06/30/2022 Annually Yes    Foot exam   : 05/05/2022 Annually Yes       Social History     Socioeconomic History    Marital status:    Tobacco Use    Smoking status: Never    Smokeless tobacco: Never   Substance and Sexual Activity    Alcohol use: Not Currently     Past Medical History:   Diagnosis Date    Arthritis     Carotid artery disease     Dr. Jessa Szymanski--cardiology    Depression     DM II (diabetes mellitus, type II), controlled 12 years    Heart disease     Dr. Jessa Szymanski--cardiology    HTN (hypertension)     Hyperlipidemia     Hypothyroidism     Insomnia     Stroke  2010    Dr. Jessa Szymanski--cardiology    Vitamin D deficiency      Review of Systems   Constitutional: Negative for activity change, appetite change, fatigue and unexpected weight change.   HENT: Negative for dental problem and trouble swallowing.    Eyes: Negative for visual disturbance.   Respiratory: Negative for chest tightness and shortness of breath.    Cardiovascular: Negative for chest pain, palpitations and leg swelling.   Gastrointestinal: Negative for abdominal pain, constipation, diarrhea, nausea and vomiting.   Endocrine: Negative for polydipsia, polyphagia and polyuria.   Genitourinary: Negative for difficulty urinating, dysuria, frequency and urgency.        Negative yeast infection   Musculoskeletal: Positive for gait problem (r/t left sided weakness, ambulates with quad cane). Negative for myalgias and neck pain.   Integumentary:  Negative for rash and wound.   Allergic/Immunologic: Negative.    Neurological: Positive for weakness (residual left sided secondary to CVA). Negative for dizziness, syncope, numbness and headaches.   Hematological: Negative.    Psychiatric/Behavioral: Negative for confusion and sleep disturbance.   All other systems reviewed and are negative.     Objective:      Physical Exam  Vitals reviewed.   Constitutional:       Appearance: Normal appearance.   HENT:      Head: Normocephalic and atraumatic.   Eyes:      General: Vision grossly intact.      Extraocular Movements: Extraocular movements intact.      Pupils: Pupils are equal, round, and reactive to light.   Neck:      Thyroid: No thyroid mass or thyroid tenderness.   Cardiovascular:      Rate and Rhythm: Normal rate and regular rhythm.      Pulses: Normal pulses.           Radial pulses are 2+ on the right side and 2+ on the left side.        Dorsalis pedis pulses are 2+ on the right side and 2+ on the left side.      Heart sounds: Normal heart sounds.   Pulmonary:      Effort: Pulmonary effort is normal.      Breath  sounds: Normal breath sounds.   Abdominal:      General: Bowel sounds are normal.      Palpations: Abdomen is soft.   Musculoskeletal:         General: Normal range of motion.      Cervical back: Normal range of motion and neck supple.      Right lower leg: No edema.      Left lower leg: No edema.      Right foot: No deformity or bunion.      Left foot: No deformity or bunion.   Feet:      Right foot:      Skin integrity: Skin integrity normal. No ulcer, skin breakdown, callus or dry skin.      Toenail Condition: Right toenails are abnormally thick.      Left foot:      Skin integrity: Skin integrity normal. No ulcer, skin breakdown, callus or dry skin.      Toenail Condition: Left toenails are abnormally thick.   Lymphadenopathy:      Cervical: No cervical adenopathy.   Skin:     General: Skin is warm and dry.      Findings: No rash or wound.      Comments: Negative for acanthosis nigricans. Well-healed SQ injection sites.   Neurological:      Mental Status: She is alert and oriented to person, place, and time.      Coordination: Coordination is intact.      Gait: Gait is intact.   Psychiatric:         Attention and Perception: Attention normal.         Mood and Affect: Mood normal.         Speech: Speech normal.         Behavior: Behavior normal. Behavior is cooperative.         Thought Content: Thought content normal.         Cognition and Memory: Cognition normal.      Assessment / Plan:     Krysta was seen today for diabetes mellitus.    Diagnoses and all orders for this visit:    Type II diabetes mellitus with peripheral circulatory disorder  -     POCT Glucose, Hand-Held Device  -     dulaglutide (TRULICITY) 3 mg/0.5 mL pen injector; Inject 3 mg into the skin every 7 days.    Hypertension, unspecified type    Hyperlipidemia, unspecified hyperlipidemia type    BMI 36.0-36.9,adult     Additional Plan Details:  - Condition: sub optimal control, most recent A1C 7.9 % was completed 1 month ago.   - Monitor blood  glucose:  4x daily.Goals reviewed. Maintain BG log. Bring to next visit for review.  - Hypoglycemia protocol: Reviewed and risk discussed.  Notify if BG readings below 80. Protocol printout provided.   - Diet: Limit carbohydrate intake 30-45 grams/meal, 3x daily and 15 grams/snack, limit 2/day.   - Activity: Recommend at least 150 minutes of exercise in a week.  - BP and LDL: Recommend lifestyle modifications and follow up with PCP for management.   - Medication Changes:    - Continue Trulicity 3 mg weekly   - Continue Glipizide 10 tablet, 1 tablet before breakfast and 1 tablet before dinner, may do half tablet if eating lighter meals   - Continue Lantus 60 units in the morning  - Continue Metformin 500 mg, 2 tablets twice daily with meals    - Medication consideration: Titrate Trulicity/basal to goal BG. May also benefit from SGLT 2 inhibitor.  - Lab results: A1C discussed.  - Health Maintenance standards addressed today:  A1c scheduled.   - Risks of uncontrolled DM explained. Importance of routine foot and eye exams reviewed. Scheduled as appropriate.  -The patient was explained the above plan and given opportunity to ask questions.  She understands, chooses and consents to this plan and accepts all the risks, which include but are not limited to the risks mentioned above.   - Labs ordered as above.  - CDE referral: Scheduled  - Follow up: 3 months with A1c lab draw prior.         Charlotte T. Delacruz, FNP-C [facility]    A total of 30 minutes was spent in face to face time, of which over 50 % was spent in counseling patient on disease process, complications, treatment, and side effects of medications.

## 2022-09-06 NOTE — PATIENT INSTRUCTIONS
Medication Regimen/Changes:    - Continue Trulicity 3 mg weekly   - Continue Glipizide 10 tablet, 1 tablet before breakfast and 1 tablet before dinner, may do half tablet if eating lighter meals   - Continue Lantus 60 units in the morning  - Continue Metformin 500 mg, 2 tablets twice daily with meals    Patient Instructions:  Carbohydrate Count: 30-45 grams/meal and 15 grams/snack with limit of 2 snacks per day.  Exercise: Goal is 150 minutes or more per week.  Bring meter and blood sugar log to each appointment.     Goals for Blood Sugar:  Fastin-130 mg/dl  2 hours after meal:  mg/dl  Before Bed: 100-150 mg/dl  If Blood sugar is below 70 mg/dl, DO NOT take diabetes medication. Eat first and then recheck blood sugar in 20 minutes.  Foods to eat if blood sugar is below 70 mg/dl  1/2 glass of orange juice   1/2 regular cola can   3-4 hard candies   3-4 small glucose tabs.   Foods to eat if blood sugar is below 50 mg/dl  1 glass of orange juice  1 regular cola can  8 hard candies  8 small glucose tabs.   If you have repeated eating/blood sugar recheck process 2 times and blood sugar still below 70 mg/dl, call 911.

## 2022-09-12 ENCOUNTER — TELEPHONE (OUTPATIENT)
Dept: INTERNAL MEDICINE | Facility: CLINIC | Age: 67
End: 2022-09-12
Payer: MEDICARE

## 2022-09-12 ENCOUNTER — NURSE TRIAGE (OUTPATIENT)
Dept: ADMINISTRATIVE | Facility: CLINIC | Age: 67
End: 2022-09-12
Payer: MEDICARE

## 2022-09-12 NOTE — TELEPHONE ENCOUNTER
No BM times 1 week she has been using miralax nightly with prune juice and she has also had MOM 2 nights ago

## 2022-09-12 NOTE — TELEPHONE ENCOUNTER
----- Message from Ros Martinez sent at 9/12/2022  1:36 PM CDT -----  Pt daughter called in re: pt has not used the bathroom x1 week pt daughter is very concerned please call pt [phone] daughter number     Thanks [facility]

## 2022-09-12 NOTE — TELEPHONE ENCOUNTER
Transferred to me from scheduling. Daughter calling on behalf of pt. States pt has been constipated. Reports has not had a BM in over a week. Intermittent abd pain. Denies rectal pain and pressure. Used OTC meds without relief. No vomiting. Care advice per protocol. Offered to try to see if PCP has available same day appts. Daughter declined. States she will take mother to ED for eval. Advised to call back with concerns or worsening symptoms. Daughter verbalized understanding.       Reason for Disposition   Last bowel movement (BM) > 4 days ago    Additional Information   Negative: Patient sounds very sick or weak to the triager   Negative: Constant abdominal pain lasting > 2 hours   Negative: Vomiting bile (green color)   Negative: Vomiting and abdomen looks much more swollen than usual   Negative: SEVERE rectal pain not relieved by Sitz bath or glycerine suppository   Negative: Abdomen is more swollen than usual   Negative: Leaking stool    Protocols used: Constipation-A-OH

## 2022-09-14 ENCOUNTER — TELEPHONE (OUTPATIENT)
Dept: INTERNAL MEDICINE | Facility: CLINIC | Age: 67
End: 2022-09-14
Payer: MEDICARE

## 2022-09-14 DIAGNOSIS — F03.90 DEMENTIA WITHOUT BEHAVIORAL DISTURBANCE, UNSPECIFIED DEMENTIA TYPE: ICD-10-CM

## 2022-09-14 DIAGNOSIS — I69.354 HEMIPARESIS AFFECTING LEFT SIDE AS LATE EFFECT OF CEREBROVASCULAR ACCIDENT (CVA): Primary | ICD-10-CM

## 2022-09-14 NOTE — TELEPHONE ENCOUNTER
Daughter was calling due to patient using her  husbands's wheelchair. Patient was told by her heart doctor that her pcp can place orders for her to get her insurance to cover the cost of a wheelchair and walker. Daughter would like a walker as well due to she can use when she out in the store with one hand since pt had a stroke before. Can you please assist with placing wheelchair and walker order to fax over to [employer]?

## 2022-09-14 NOTE — TELEPHONE ENCOUNTER
----- Message from Mariana Estrada sent at 9/14/2022  2:47 PM CDT -----  Please call pt daughter Kamille @ [phone] regarding pt, states pt need order for New Wheel chair, Scooter under pt [employer].

## 2022-10-06 DIAGNOSIS — I10 HYPERTENSION, UNSPECIFIED TYPE: ICD-10-CM

## 2022-10-06 RX ORDER — IRBESARTAN 300 MG/1
300 TABLET ORAL NIGHTLY
Qty: 30 TABLET | Refills: 5 | Status: SHIPPED | OUTPATIENT
Start: 2022-10-06 | End: 2023-04-07

## 2022-10-06 RX ORDER — HYDROCHLOROTHIAZIDE 25 MG/1
25 TABLET ORAL DAILY
Qty: 30 TABLET | Refills: 5 | Status: SHIPPED | OUTPATIENT
Start: 2022-10-06 | End: 2023-04-07

## 2022-10-06 NOTE — TELEPHONE ENCOUNTER
No new care gaps identified.  [facility] Embedded Care Gaps. Reference number: [id number]. 10/06/2022   8:52:32 AM CDT

## 2022-10-12 DIAGNOSIS — E11.51 TYPE II DIABETES MELLITUS WITH PERIPHERAL CIRCULATORY DISORDER: ICD-10-CM

## 2022-10-12 DIAGNOSIS — I10 ESSENTIAL HYPERTENSION: ICD-10-CM

## 2022-10-12 RX ORDER — ASPIRIN 81 MG/1
81 TABLET ORAL DAILY
Qty: 90 TABLET | Refills: 1 | Status: SHIPPED | OUTPATIENT
Start: 2022-10-12 | End: 2022-11-16 | Stop reason: SDUPTHER

## 2022-10-12 RX ORDER — GABAPENTIN 100 MG/1
100 CAPSULE ORAL DAILY
Qty: 30 CAPSULE | Refills: 0 | Status: CANCELLED | OUTPATIENT
Start: 2022-10-12

## 2022-10-12 NOTE — TELEPHONE ENCOUNTER
Daughter said her dementia is getting worse they are in the process of moving pt just got out of the hospital so I made a hospital follow up with Dr Cook

## 2022-10-12 NOTE — TELEPHONE ENCOUNTER
----- Message from Rose Berger sent at 10/11/2022  4:22 PM CDT -----  Contact: Kamille/ Daughter  Patients daughter is calling to speak with the nurse regarding discussing patients health. Reports wanting to speak as soon as possible with concerns. Please give Kamille a call at [phone]

## 2022-11-03 ENCOUNTER — PATIENT MESSAGE (OUTPATIENT)
Dept: RESEARCH | Facility: HOSPITAL | Age: 67
End: 2022-11-03
Payer: MEDICARE

## 2022-11-07 ENCOUNTER — TELEPHONE (OUTPATIENT)
Dept: INTERNAL MEDICINE | Facility: CLINIC | Age: 67
End: 2022-11-07
Payer: MEDICARE

## 2022-11-07 NOTE — TELEPHONE ENCOUNTER
----- Message from Mitch Sheppard sent at 11/7/2022  1:51 PM CST -----  Contact: Yara daughter [phone]  Pt's daughter called in regards to speaking with the nurse she has some concerns please advise

## 2022-11-07 NOTE — TELEPHONE ENCOUNTER
Daughter wants to advised Dr. Cook memory loss has worsen. Daughter states patient happiness is gone. She won't move from her recliner daughter would have to make her stand up. Daughter states if she mention any of this during upcoming visit patient would get mad.

## 2022-11-16 ENCOUNTER — OFFICE VISIT (OUTPATIENT)
Dept: INTERNAL MEDICINE | Facility: CLINIC | Age: 67
End: 2022-11-16
Payer: MEDICARE

## 2022-11-16 VITALS
HEART RATE: 82 BPM | BODY MASS INDEX: 37.46 KG/M2 | HEIGHT: 63 IN | RESPIRATION RATE: 20 BRPM | SYSTOLIC BLOOD PRESSURE: 138 MMHG | TEMPERATURE: 97 F | OXYGEN SATURATION: 98 % | WEIGHT: 211.44 LBS | DIASTOLIC BLOOD PRESSURE: 70 MMHG

## 2022-11-16 DIAGNOSIS — K59.09 CHRONIC CONSTIPATION: Primary | ICD-10-CM

## 2022-11-16 DIAGNOSIS — E11.65 UNCONTROLLED TYPE 2 DIABETES MELLITUS WITH HYPERGLYCEMIA, WITH LONG-TERM CURRENT USE OF INSULIN: ICD-10-CM

## 2022-11-16 DIAGNOSIS — E78.5 HYPERLIPIDEMIA LDL GOAL <70: ICD-10-CM

## 2022-11-16 DIAGNOSIS — I10 HYPERTENSION GOAL BP (BLOOD PRESSURE) < 130/80: ICD-10-CM

## 2022-11-16 DIAGNOSIS — I69.354 HEMIPARESIS AFFECTING LEFT SIDE AS LATE EFFECT OF CEREBROVASCULAR ACCIDENT: ICD-10-CM

## 2022-11-16 DIAGNOSIS — Z79.4 UNCONTROLLED TYPE 2 DIABETES MELLITUS WITH HYPERGLYCEMIA, WITH LONG-TERM CURRENT USE OF INSULIN: ICD-10-CM

## 2022-11-16 DIAGNOSIS — E03.9 HYPOTHYROIDISM (ACQUIRED): ICD-10-CM

## 2022-11-16 DIAGNOSIS — F03.90 DEMENTIA WITHOUT BEHAVIORAL DISTURBANCE: ICD-10-CM

## 2022-11-16 PROCEDURE — 3061F NEG MICROALBUMINURIA REV: CPT | Mod: CPTII,S$GLB,, | Performed by: INTERNAL MEDICINE

## 2022-11-16 PROCEDURE — 3066F PR DOCUMENTATION OF TREATMENT FOR NEPHROPATHY: ICD-10-PCS | Mod: CPTII,S$GLB,, | Performed by: INTERNAL MEDICINE

## 2022-11-16 PROCEDURE — 3008F BODY MASS INDEX DOCD: CPT | Mod: CPTII,S$GLB,, | Performed by: INTERNAL MEDICINE

## 2022-11-16 PROCEDURE — 1101F PR PT FALLS ASSESS DOC 0-1 FALLS W/OUT INJ PAST YR: ICD-10-PCS | Mod: CPTII,S$GLB,, | Performed by: INTERNAL MEDICINE

## 2022-11-16 PROCEDURE — 3051F PR MOST RECENT HEMOGLOBIN A1C LEVEL 7.0 - < 8.0%: ICD-10-PCS | Mod: CPTII,S$GLB,, | Performed by: INTERNAL MEDICINE

## 2022-11-16 PROCEDURE — 3288F PR FALLS RISK ASSESSMENT DOCUMENTED: ICD-10-PCS | Mod: CPTII,S$GLB,, | Performed by: INTERNAL MEDICINE

## 2022-11-16 PROCEDURE — 1160F PR REVIEW ALL MEDS BY PRESCRIBER/CLIN PHARMACIST DOCUMENTED: ICD-10-PCS | Mod: CPTII,S$GLB,, | Performed by: INTERNAL MEDICINE

## 2022-11-16 PROCEDURE — 1159F MED LIST DOCD IN RCRD: CPT | Mod: CPTII,S$GLB,, | Performed by: INTERNAL MEDICINE

## 2022-11-16 PROCEDURE — 4010F PR ACE/ARB THEARPY RXD/TAKEN: ICD-10-PCS | Mod: CPTII,S$GLB,, | Performed by: INTERNAL MEDICINE

## 2022-11-16 PROCEDURE — 3061F PR NEG MICROALBUMINURIA RESULT DOCUMENTED/REVIEW: ICD-10-PCS | Mod: CPTII,S$GLB,, | Performed by: INTERNAL MEDICINE

## 2022-11-16 PROCEDURE — 99499 UNLISTED E&M SERVICE: CPT | Mod: S$GLB,,, | Performed by: INTERNAL MEDICINE

## 2022-11-16 PROCEDURE — 99499 RISK ADDL DX/OHS AUDIT: ICD-10-PCS | Mod: S$GLB,,, | Performed by: INTERNAL MEDICINE

## 2022-11-16 PROCEDURE — 99999 PR PBB SHADOW E&M-EST. PATIENT-LVL V: ICD-10-PCS | Mod: PBBFAC,,, | Performed by: INTERNAL MEDICINE

## 2022-11-16 PROCEDURE — 3078F PR MOST RECENT DIASTOLIC BLOOD PRESSURE < 80 MM HG: ICD-10-PCS | Mod: CPTII,S$GLB,, | Performed by: INTERNAL MEDICINE

## 2022-11-16 PROCEDURE — 3051F HG A1C>EQUAL 7.0%<8.0%: CPT | Mod: CPTII,S$GLB,, | Performed by: INTERNAL MEDICINE

## 2022-11-16 PROCEDURE — 99214 OFFICE O/P EST MOD 30 MIN: CPT | Mod: S$GLB,,, | Performed by: INTERNAL MEDICINE

## 2022-11-16 PROCEDURE — 1160F RVW MEDS BY RX/DR IN RCRD: CPT | Mod: CPTII,S$GLB,, | Performed by: INTERNAL MEDICINE

## 2022-11-16 PROCEDURE — 99999 PR PBB SHADOW E&M-EST. PATIENT-LVL V: CPT | Mod: PBBFAC,,, | Performed by: INTERNAL MEDICINE

## 2022-11-16 PROCEDURE — 3075F SYST BP GE 130 - 139MM HG: CPT | Mod: CPTII,S$GLB,, | Performed by: INTERNAL MEDICINE

## 2022-11-16 PROCEDURE — 3078F DIAST BP <80 MM HG: CPT | Mod: CPTII,S$GLB,, | Performed by: INTERNAL MEDICINE

## 2022-11-16 PROCEDURE — 1126F AMNT PAIN NOTED NONE PRSNT: CPT | Mod: CPTII,S$GLB,, | Performed by: INTERNAL MEDICINE

## 2022-11-16 PROCEDURE — 3075F PR MOST RECENT SYSTOLIC BLOOD PRESS GE 130-139MM HG: ICD-10-PCS | Mod: CPTII,S$GLB,, | Performed by: INTERNAL MEDICINE

## 2022-11-16 PROCEDURE — 3288F FALL RISK ASSESSMENT DOCD: CPT | Mod: CPTII,S$GLB,, | Performed by: INTERNAL MEDICINE

## 2022-11-16 PROCEDURE — 3008F PR BODY MASS INDEX (BMI) DOCUMENTED: ICD-10-PCS | Mod: CPTII,S$GLB,, | Performed by: INTERNAL MEDICINE

## 2022-11-16 PROCEDURE — 4010F ACE/ARB THERAPY RXD/TAKEN: CPT | Mod: CPTII,S$GLB,, | Performed by: INTERNAL MEDICINE

## 2022-11-16 PROCEDURE — 99214 PR OFFICE/OUTPT VISIT, EST, LEVL IV, 30-39 MIN: ICD-10-PCS | Mod: S$GLB,,, | Performed by: INTERNAL MEDICINE

## 2022-11-16 PROCEDURE — 1101F PT FALLS ASSESS-DOCD LE1/YR: CPT | Mod: CPTII,S$GLB,, | Performed by: INTERNAL MEDICINE

## 2022-11-16 PROCEDURE — 1159F PR MEDICATION LIST DOCUMENTED IN MEDICAL RECORD: ICD-10-PCS | Mod: CPTII,S$GLB,, | Performed by: INTERNAL MEDICINE

## 2022-11-16 PROCEDURE — 1126F PR PAIN SEVERITY QUANTIFIED, NO PAIN PRESENT: ICD-10-PCS | Mod: CPTII,S$GLB,, | Performed by: INTERNAL MEDICINE

## 2022-11-16 PROCEDURE — 3066F NEPHROPATHY DOC TX: CPT | Mod: CPTII,S$GLB,, | Performed by: INTERNAL MEDICINE

## 2022-11-16 RX ORDER — LINACLOTIDE 145 UG/1
145 CAPSULE, GELATIN COATED ORAL DAILY
COMMUNITY
Start: 2022-09-29 | End: 2022-11-16 | Stop reason: SDUPTHER

## 2022-11-16 RX ORDER — ASPIRIN 81 MG/1
81 TABLET ORAL DAILY
Qty: 90 TABLET | Refills: 1 | Status: SHIPPED | OUTPATIENT
Start: 2022-11-16 | End: 2023-10-24

## 2022-11-16 RX ORDER — MEMANTINE HYDROCHLORIDE 10 MG/1
10 TABLET ORAL 2 TIMES DAILY
Qty: 180 TABLET | Refills: 1 | Status: SHIPPED | OUTPATIENT
Start: 2022-11-16 | End: 2023-11-10

## 2022-11-16 RX ORDER — ROSUVASTATIN CALCIUM 10 MG/1
10 TABLET, COATED ORAL NIGHTLY
Qty: 90 TABLET | Refills: 1 | Status: SHIPPED | OUTPATIENT
Start: 2022-11-16 | End: 2023-11-08

## 2022-11-16 NOTE — PROGRESS NOTES
Krysta Manuel  67 y.o. Black or  female  [id number]    Chief Complaint:  Chief Complaint   Patient presents with    Hospital Follow Up       History of Present Illness:  Presents to the clinic for hospital follow up. She was recently at [facility]. Her records are not available.   Her daughter provides the history and states she was hospitalized for constipation. She was started on Linzess and it has helped. She needs refills.   Dementia--compliant with memantine, however, her symptoms seem to be worsening. She has not seen neurology.   CVA--no new symptoms. She needs a refill on aspirin.    HTN--stable on current medication.   HLD--compliant with rosuvastatin.   DM--getting high readings at home. Due for labs.   Hypothyroidism--compliant with levothyroxine.     Laboratory:  Lab Results   Component Value Date    WBC 10.1 12/29/2021    HGB 11.7 12/29/2021    HCT 36.4 12/29/2021     12/29/2021    CHOL 90 (L) 07/18/2022    TRIG 60 07/18/2022    HDL 36 (L) 07/18/2022    ALT 21 07/18/2022    AST 17 07/18/2022     07/18/2022    K 4.6 07/18/2022     07/18/2022    CREATININE 1.0 07/18/2022    BUN 25 (H) 07/18/2022    CO2 23 07/18/2022    TSH 2.804 07/18/2022    HGBA1C 7.9 (H) 07/18/2022       History:  Past Medical History:   Diagnosis Date    Arthritis     Carotid artery disease     Dr. Jessa Szymanski--cardiology    Depression     DM II (diabetes mellitus, type II), controlled 12 years    Heart disease     Dr. Jessa Szymanski--cardiology    HTN (hypertension)     Hyperlipidemia     Hypothyroidism     Insomnia     Stroke 2010    Dr. Jessa Szymanski--cardiology    Vitamin D deficiency        Current Outpatient Medications:     blood sugar diagnostic Strp, To use 3 times daily, Disp: 200 strip, Rfl: 10    carvediloL (COREG) 25 MG tablet, Take 25 mg by mouth 2 (two) times daily., Disp: , Rfl:     clopidogreL (PLAVIX) 75 mg tablet, Take 75 mg by mouth once daily., Disp: , Rfl:     diltiaZEM (DILACOR XR)  "240 MG CDCR, Take 240 mg by mouth once daily., Disp: , Rfl:     dulaglutide (TRULICITY) 3 mg/0.5 mL pen injector, Inject 3 mg into the skin every 7 days., Disp: 4 pen, Rfl: 5    EASY TOUCH LANCING DEVICE Misc, , Disp: , Rfl:     fluticasone propionate (FLONASE) 50 mcg/actuation nasal spray, 2 SPRAYS IN EACH NOSTRIL EVERY DAY AS NEEDED, Disp: 16 g, Rfl: 1    glipiZIDE (GLUCOTROL) 10 MG tablet, TAKE ONE TABLET BY MOUTH TWO TIMES A DAY BEFORE MEALS, Disp: 60 tablet, Rfl: 3    hydroCHLOROthiazide (HYDRODIURIL) 25 MG tablet, Take 1 tablet (25 mg total) by mouth once daily., Disp: 30 tablet, Rfl: 5    HYDROcodone-acetaminophen (NORCO)  mg per tablet, , Disp: , Rfl: 0    irbesartan (AVAPRO) 300 MG tablet, Take 1 tablet (300 mg total) by mouth every evening., Disp: 30 tablet, Rfl: 5    lancing device with lancets Kit, 1 each by Misc.(Non-Drug; Combo Route) route 3 (three) times daily., Disp: 200 each, Rfl: 10    LANTUS SOLOSTAR U-100 INSULIN glargine 100 units/mL SubQ pen, Inject 60 Units into the skin once daily., Disp: 30 mL, Rfl: 3    levothyroxine (SYNTHROID) 125 MCG tablet, Take 1 tablet (125 mcg total) by mouth before breakfast., Disp: 30 tablet, Rfl: 11    metFORMIN (GLUCOPHAGE-XR) 500 MG ER 24hr tablet, Take 2 tablets (1,000 mg total) by mouth 2 (two) times daily with meals., Disp: 360 tablet, Rfl: 1    ondansetron (ZOFRAN-ODT) 4 MG TbDL, Take 4 mg by mouth every 8 (eight) hours as needed., Disp: , Rfl:     sertraline (ZOLOFT) 100 MG tablet, Take 1.5 tablets (150 mg total) by mouth once daily., Disp: 45 tablet, Rfl: 2    SURE COMFORT PEN NEEDLE 32 gauge x 5/32" Ndle, , Disp: , Rfl:     zolpidem (AMBIEN) 10 mg Tab, TAKE ONE TABLET BY MOUTH AT BEDTIME AS NEEDED MAY CAUSE DROWSINESS, USE CAUTION FOR SLEEP, Disp: , Rfl:     aspirin (ECOTRIN) 81 MG EC tablet, Take 1 tablet (81 mg total) by mouth once daily., Disp: 90 tablet, Rfl: 1    linaCLOtide (LINZESS) 145 mcg Cap capsule, Take 1 capsule (145 mcg total) by " mouth once daily., Disp: 90 capsule, Rfl: 1    memantine (NAMENDA) 10 MG Tab, Take 1 tablet (10 mg total) by mouth 2 (two) times daily., Disp: 180 tablet, Rfl: 1    rosuvastatin (CRESTOR) 10 MG tablet, Take 1 tablet (10 mg total) by mouth nightly., Disp: 90 tablet, Rfl: 1      Allergies:  Review of patient's allergies indicates:  No Known Allergies    Review of Systems   Respiratory:  Negative for shortness of breath.    Cardiovascular:  Negative for chest pain and leg swelling.   Gastrointestinal:  Positive for constipation.   Genitourinary:  Negative for frequency.   Neurological:  Positive for weakness (left side; h/o CVA).   Psychiatric/Behavioral:  Positive for memory loss. The patient does not have insomnia.      Exam:  Vitals:    11/16/22 0809   BP: 138/70   Pulse: 82   Resp: 20   Temp: 97.3 °F (36.3 °C)     Weight: 95.9 kg (211 lb 6.7 oz)   Body mass index is 37.45 kg/m².      Physical Exam  Vitals reviewed.   Constitutional:       General: She is not in acute distress.     Appearance: She is well-developed. She is not ill-appearing.   Eyes:      General: No scleral icterus.  Cardiovascular:      Rate and Rhythm: Normal rate and regular rhythm.      Heart sounds: Normal heart sounds.   Pulmonary:      Effort: Pulmonary effort is normal. No respiratory distress.      Breath sounds: Normal breath sounds.   Abdominal:      General: Bowel sounds are normal.      Palpations: Abdomen is soft.   Musculoskeletal:      Right lower leg: No edema.      Left lower leg: No edema.   Skin:     General: Skin is warm and dry.   Neurological:      Mental Status: She is alert and oriented to person, place, and time.   Psychiatric:         Behavior: Behavior normal.       Assessment:  The primary encounter diagnosis was Chronic constipation. Diagnoses of Dementia without behavioral disturbance, Hemiparesis affecting left side as late effect of cerebrovascular accident, Hypertension goal BP (blood pressure) < 130/80,  Hyperlipidemia LDL goal <70, Uncontrolled type 2 diabetes mellitus with hyperglycemia, with long-term current use of insulin, and Hypothyroidism (acquired) were also pertinent to this visit.    Plan:  Chronic constipation  -     refill linaCLOtide (LINZESS) 145 mcg Cap capsule; Take 1 capsule (145 mcg total) by mouth once daily.  Dispense: 90 capsule; Refill: 1    Dementia without behavioral disturbance  -     refill memantine (NAMENDA) 10 MG Tab; Take 1 tablet (10 mg total) by mouth 2 (two) times daily.  Dispense: 180 tablet; Refill: 1  -     Ambulatory referral/consult to Neurology; Future; Expected date: 11/23/2022    Hemiparesis affecting left side as late effect of cerebrovascular accident  -     refill aspirin (ECOTRIN) 81 MG EC tablet; Take 1 tablet (81 mg total) by mouth once daily.  Dispense: 90 tablet; Refill: 1  -     continue clopidogrel and rosuvastatin     Hypertension goal BP (blood pressure) < 130/80  -     continue irbesartan, carvedilol and diltiazem    Hyperlipidemia LDL goal <70  -     rosuvastatin (CRESTOR) 10 MG tablet; Take 1 tablet (10 mg total) by mouth nightly.  Dispense: 90 tablet; Refill: 1    Uncontrolled type 2 diabetes mellitus with hyperglycemia, with long-term current use of insulin  -     check A1C and CMP    Hypothyroidism (acquired)  -     check TSH    Schedule labs.     RTC as previously scheduled.

## 2022-12-05 ENCOUNTER — TELEPHONE (OUTPATIENT)
Dept: INTERNAL MEDICINE | Facility: CLINIC | Age: 67
End: 2022-12-05
Payer: MEDICARE

## 2022-12-05 NOTE — TELEPHONE ENCOUNTER
Liz Harrell PA-C  You 2 hours ago (11:00 AM)     It looks like Neurology referral was placed at last visit, have they seen or do they have appt scheduled with Neurology?

## 2022-12-05 NOTE — TELEPHONE ENCOUNTER
Dementia is getting much worse since they have moved she is taking the wrong medications she curses at everyone etc

## 2022-12-05 NOTE — TELEPHONE ENCOUNTER
----- Message from Amaya Blake sent at 12/5/2022 10:20 AM CST -----  Contact: [phone]  Kamille requesting a call in regards to pt. She stated that she would like to talk with someone in regards to things that has been going on with pt. Please call her back at [phone]. Thanks KB

## 2022-12-05 NOTE — TELEPHONE ENCOUNTER
Spoke to family and they have problems with transportation daughter will call insurance to see if they cover a neurologist close to them and let us know where we can send the referral

## 2022-12-08 ENCOUNTER — OFFICE VISIT (OUTPATIENT)
Dept: DIABETES | Facility: CLINIC | Age: 67
End: 2022-12-08
Payer: MEDICARE

## 2022-12-08 DIAGNOSIS — E11.51 TYPE II DIABETES MELLITUS WITH PERIPHERAL CIRCULATORY DISORDER: Primary | ICD-10-CM

## 2022-12-08 DIAGNOSIS — I10 HYPERTENSION, UNSPECIFIED TYPE: ICD-10-CM

## 2022-12-08 DIAGNOSIS — E78.5 HYPERLIPIDEMIA, UNSPECIFIED HYPERLIPIDEMIA TYPE: ICD-10-CM

## 2022-12-08 PROCEDURE — 4010F ACE/ARB THERAPY RXD/TAKEN: CPT | Mod: CPTII,95,, | Performed by: NURSE PRACTITIONER

## 2022-12-08 PROCEDURE — 3061F PR NEG MICROALBUMINURIA RESULT DOCUMENTED/REVIEW: ICD-10-PCS | Mod: CPTII,95,, | Performed by: NURSE PRACTITIONER

## 2022-12-08 PROCEDURE — 4010F PR ACE/ARB THEARPY RXD/TAKEN: ICD-10-PCS | Mod: CPTII,95,, | Performed by: NURSE PRACTITIONER

## 2022-12-08 PROCEDURE — 1160F PR REVIEW ALL MEDS BY PRESCRIBER/CLIN PHARMACIST DOCUMENTED: ICD-10-PCS | Mod: CPTII,95,, | Performed by: NURSE PRACTITIONER

## 2022-12-08 PROCEDURE — 1159F MED LIST DOCD IN RCRD: CPT | Mod: CPTII,95,, | Performed by: NURSE PRACTITIONER

## 2022-12-08 PROCEDURE — 3061F NEG MICROALBUMINURIA REV: CPT | Mod: CPTII,95,, | Performed by: NURSE PRACTITIONER

## 2022-12-08 PROCEDURE — 3051F HG A1C>EQUAL 7.0%<8.0%: CPT | Mod: CPTII,95,, | Performed by: NURSE PRACTITIONER

## 2022-12-08 PROCEDURE — 3051F PR MOST RECENT HEMOGLOBIN A1C LEVEL 7.0 - < 8.0%: ICD-10-PCS | Mod: CPTII,95,, | Performed by: NURSE PRACTITIONER

## 2022-12-08 PROCEDURE — 3066F PR DOCUMENTATION OF TREATMENT FOR NEPHROPATHY: ICD-10-PCS | Mod: CPTII,95,, | Performed by: NURSE PRACTITIONER

## 2022-12-08 PROCEDURE — 1160F RVW MEDS BY RX/DR IN RCRD: CPT | Mod: CPTII,95,, | Performed by: NURSE PRACTITIONER

## 2022-12-08 PROCEDURE — 3066F NEPHROPATHY DOC TX: CPT | Mod: CPTII,95,, | Performed by: NURSE PRACTITIONER

## 2022-12-08 PROCEDURE — 99214 OFFICE O/P EST MOD 30 MIN: CPT | Mod: 95,,, | Performed by: NURSE PRACTITIONER

## 2022-12-08 PROCEDURE — 1159F PR MEDICATION LIST DOCUMENTED IN MEDICAL RECORD: ICD-10-PCS | Mod: CPTII,95,, | Performed by: NURSE PRACTITIONER

## 2022-12-08 PROCEDURE — 99214 PR OFFICE/OUTPT VISIT, EST, LEVL IV, 30-39 MIN: ICD-10-PCS | Mod: 95,,, | Performed by: NURSE PRACTITIONER

## 2022-12-08 RX ORDER — DULAGLUTIDE 4.5 MG/.5ML
4.5 INJECTION, SOLUTION SUBCUTANEOUS
Qty: 4 PEN | Refills: 6 | Status: SHIPPED | OUTPATIENT
Start: 2022-12-08 | End: 2023-06-13 | Stop reason: SDUPTHER

## 2022-12-08 NOTE — PROGRESS NOTES
Established Patient - Virtual Telehealth Visit     The patient location is: Home ([place])  The chief complaint leading to consultation is: Diabetes Follow-up  Visit type: Virtual visit with synchronous audio and video via Epic Haiku jessie  Total time spent with patient: 15 minutes     Each patient to whom I provide medical services by telemedicine is:  (1) informed of the relationship between the physician and patient and the respective role of any other health care provider with respect to management of the patient; and (2) notified that they may decline to receive medical services by telemedicine and may withdraw from such care at any time. Patient verbally consented to receive this service via voice-only telephone call.    PCP: Wanda Cook DO    Subjective:     Chief Complaint: Diabetes follow up.  Last visit with me: 9/6/2022    HPI: 66 y.o.  female for diabetes follow up.   Type II diabetes for > 10 years.  Comorbidities: HTN, HLD, CAD, S/p CVA (2010), Left Hemiparesis, Hypothyroidism and Overweight by BMI   Accompanied by daughter during visit.    Family History of Type 2 DM: father  Known diabetes complications:  DKA/HHS: no  Retinopathy: no  Peripheral neuropathy: no  Nephropathy: no    Interval history:  Denies recent hospital admissions or ER visits.   Admits having hypoglycemia, bedtime on the 60's  Admits to eating light dinner.  Endorses diabetes related symptoms: None.    Blood glucose monitoring via fingerstick: 2-3 x/day   Per patient's BG log, over the last 1 week   Fasting BG average 180   Mid afternoon BG readings average 190's   Early evening BG readings range  230's   Bedtime 160-180     Activity level: Sedentary  Meal Planning:3 meals/day;1 snacks/day.    Social history:    - Single, Lives with daughter, grandchild    Medication compliance all of the time, diet compliance most of the time.   To be enrolled in diabetes education classes.     Previous regimen: Humalog  75/25    Past failed treatment: N/A    Current DM Medications:  Diabetes Medications               dulaglutide (TRULICITY) 3 mg/0.5 mL pen injector Inject 3 mg into the skin every 7 days.    glipiZIDE (GLUCOTROL) 10 MG tablet TAKE ONE TABLET BY MOUTH TWO TIMES A DAY BEFORE MEALS    LANTUS SOLOSTAR U-100 INSULIN glargine 100 units/mL SubQ pen Inject 60 Units into the skin once daily.    metFORMIN (GLUCOPHAGE-XR) 500 MG ER 24hr tablet Take 2 tablets (1,000 mg total) by mouth 2 (two) times daily with meals.     Allergies  Review of patient's allergies indicates:  No Known Allergies    Labs Reviewed:  Her most recent A1C is:  Lab Results   Component Value Date    HGBA1C 7.9 (H) 07/18/2022    HGBA1C 8.2 (H) 04/19/2022    HGBA1C 7.8 12/29/2021       Her most recent BMP is:  Lab Results   Component Value Date     07/18/2022    K 4.6 07/18/2022     07/18/2022    CO2 23 07/18/2022    BUN 25 (H) 07/18/2022    CREATININE 1.0 07/18/2022    CALCIUM 9.9 07/18/2022    ANIONGAP 10 07/18/2022    ESTGFRAFRICA >60.0 07/18/2022    EGFRNONAA 58.0 (A) 07/18/2022       No results found for: CPEPTIDE  No results found for: GLUTAMICACID    There is no height or weight on file to calculate BMI.    Wt Readings from Last 3 Encounters:   11/16/22 0809 95.9 kg (211 lb 6.7 oz)   09/06/22 1433 93.9 kg (207 lb)   08/26/22 0926 94.3 kg (207 lb 14.3 oz)        Her blood sugar in clinic today is: Not assessed due to type of visit.    Diabetes Management Status  Statin: Taking  ACE/ARB: Taking    Screening or Prevention Patient's value Goal Complete/Controlled?   HgA1C Testing and Control   Lab Results   Component Value Date    HGBA1C 7.9 (H) 07/18/2022      Annually/Less than 8% Yes     Lipid profile : 07/18/2022 Annually Yes     LDL control Lab Results   Component Value Date    LDLCALC 42.0 (L) 07/18/2022    Annually/Less than 100 mg/dl  Yes     Nephropathy screening Lab Results   Component Value Date    MICALBCREAT 4.6 02/03/2022     No  results found for: PROTEINUA Annually Yes     Blood pressure BP Readings from Last 1 Encounters:   11/16/22 138/70    Less than 140/90 Yes     Dilated retinal exam : 06/30/2022 Annually Yes    Foot exam   : 05/05/2022 Annually Yes       Social History     Socioeconomic History    Marital status:    Tobacco Use    Smoking status: Never    Smokeless tobacco: Never   Substance and Sexual Activity    Alcohol use: Not Currently     Past Medical History:   Diagnosis Date    Arthritis     Carotid artery disease     Dr. Jessa Szymanski--cardiology    Depression     DM II (diabetes mellitus, type II), controlled 12 years    Heart disease     Dr. Jessa Szymanski--cardiology    HTN (hypertension)     Hyperlipidemia     Hypothyroidism     Insomnia     Stroke 2010    Dr. Jessa Szymanski--cardiology    Vitamin D deficiency      Review of Systems   Constitutional: Negative for activity change, appetite change, fatigue and unexpected weight change.   HENT: Negative for dental problem and trouble swallowing.    Eyes: Negative for visual disturbance.   Respiratory: Negative for chest tightness and shortness of breath.    Cardiovascular: Negative for chest pain, palpitations and leg swelling.   Gastrointestinal: Negative for abdominal pain, constipation, diarrhea, nausea and vomiting.   Endocrine: Negative for polydipsia, polyphagia and polyuria.   Genitourinary: Negative for difficulty urinating, dysuria, frequency and urgency.        Negative yeast infection   Musculoskeletal: Positive for gait problem (r/t left sided weakness, ambulates with quad cane). Negative for myalgias and neck pain.   Integumentary:  Negative for rash and wound.   Allergic/Immunologic: Negative.    Neurological: Positive for weakness (residual left sided secondary to CVA). Negative for dizziness, syncope, numbness and headaches.   Hematological: Negative.    Psychiatric/Behavioral: Negative for confusion and sleep disturbance.   All other systems reviewed  and are negative.     Objective:      Physical Exam  Constitutional:       General: Awake.      Appearance: Normal appearance. Well-developed and well-groomed.   HENT:      Head: Normocephalic and atraumatic.   Eyes:      General: Lids are normal. Gaze aligned appropriately.   Pulmonary:      Effort: Pulmonary effort is normal. No respiratory distress.   Neurological:      Mental Status: Alert and oriented to person, place, and time.   Psychiatric:         Attention and Perception: Attention normal.         Mood and Affect: Mood and affect normal.         Speech: Speech normal.         Behavior: Behavior normal.         Thought Content: Thought content normal.         Cognition and Memory: Cognition normal.         Judgment: Judgment normal.      Assessment / Plan:     Diagnoses and all orders for this visit:    Type II diabetes mellitus with peripheral circulatory disorder  -     Hemoglobin A1C; Future  -     dulaglutide (TRULICITY) 4.5 mg/0.5 mL pen injector; Inject 4.5 mg into the skin every 7 days.    Hypertension, unspecified type    Hyperlipidemia, unspecified hyperlipidemia type    Additional Plan Details:  - Condition: Sub optimal control, most recent A1C 7.9 % was completed 4 months ago.   - Monitor blood glucose:  4x daily.Goals reviewed. Maintain BG log. Bring to next visit for review.  - Hypoglycemia protocol: Reviewed and risk discussed.  Notify if BG readings below 80. Protocol printout provided.   - Diet: Limit carbohydrate intake 30-45 grams/meal, 3x daily and 15 grams/snack, limit 2/day.   - Activity: Recommend at least 150 minutes of exercise in a week.  - BP and LDL: Recommend lifestyle modifications and follow up with PCP for management.   - Medication Changes:   Increase Trulicity 4.5 mg weekly  Continue Glipizide 10 tablet, 1 tablet before breakfast and 1 tablet before dinner, may do half tablet if eating lighter meals  Continue Lantus 60 units in the morning  Continue Metformin 500 mg, 2  tablets twice daily with meals    - Medication consideration: Titrate Trulicity/basal to goal BG. May also benefit from SGLT 2 inhibitor.  - Lab results: A1C discussed.  - Health Maintenance standards addressed today:  A1c scheduled.   - Risks of uncontrolled DM explained. Importance of routine foot and eye exams reviewed. Scheduled as appropriate.  -The patient was explained the above plan and given opportunity to ask questions.  She understands, chooses and consents to this plan and accepts all the risks, which include but are not limited to the risks mentioned above.   - Labs ordered as above.  - CDE referral: Scheduled  - Follow up: 3 months with A1c lab draw prior.        Zenaida Shetty, FNP-C [facility]    A total of 15 minutes was spent in face to face time, of which over 50 % was spent in counseling patient on disease process, complications, treatment, and side effects of medications.

## 2022-12-08 NOTE — PATIENT INSTRUCTIONS
Medication Regimen:   Increase Trulicity 4.5 mg weekly  Continue Glipizide 10 tablet, 1 tablet before breakfast and 1 tablet before dinner, may do half tablet if eating lighter meals  Continue Lantus 60 units in the morning  Continue Metformin 500 mg, 2 tablets twice daily with meals    Patient Instructions:  Carbohydrate Count: 30-45 grams/meal and 15 grams/snack with limit of 2 snacks per day.  Exercise: Goal is 150 minutes or more per week.  Bring meter and blood sugar log to each appointment.     Goals for Blood Sugar:  Fastin-130 mg/dl  2 hours after meal:  mg/dl  Before Bed: 100-150 mg/dl  If Blood sugar is below 70 mg/dl, DO NOT take diabetes medication. Eat first and then recheck blood sugar in 20 minutes.  Foods to eat if blood sugar is below 70 mg/dl  1/2 glass of orange juice   1/2 regular cola can   3-4 hard candies   3-4 small glucose tabs.   Foods to eat if blood sugar is below 50 mg/dl  1 glass of orange juice  1 regular cola can  8 hard candies  8 small glucose tabs.   If you have repeated eating/blood sugar recheck process 2 times and blood sugar still below 70 mg/dl, call 911.

## 2022-12-19 ENCOUNTER — LAB VISIT (OUTPATIENT)
Dept: LAB | Facility: HOSPITAL | Age: 67
End: 2022-12-19
Attending: NURSE PRACTITIONER
Payer: MEDICARE

## 2022-12-19 DIAGNOSIS — E11.51 TYPE II DIABETES MELLITUS WITH PERIPHERAL CIRCULATORY DISORDER: ICD-10-CM

## 2022-12-19 LAB
ESTIMATED AVG GLUCOSE: 174 MG/DL (ref 68–131)
HBA1C MFR BLD: 7.7 % (ref 4–5.6)

## 2022-12-19 PROCEDURE — 83036 HEMOGLOBIN GLYCOSYLATED A1C: CPT | Performed by: NURSE PRACTITIONER

## 2022-12-19 PROCEDURE — 36415 COLL VENOUS BLD VENIPUNCTURE: CPT | Performed by: NURSE PRACTITIONER

## 2022-12-20 ENCOUNTER — TELEPHONE (OUTPATIENT)
Dept: DIABETES | Facility: CLINIC | Age: 67
End: 2022-12-20
Payer: MEDICARE

## 2022-12-20 NOTE — TELEPHONE ENCOUNTER
Patient was notified and informed. Pt verbalized understanding. No further questions/concerns at this time.     ----- Message from Zenaida Shetty NP sent at 12/20/2022  4:29 PM CST -----  Inform patient I have reviewed recent A1C lab result.   It is 7.7, slightly improved from previous reading.   Remains above goal.

## 2022-12-21 ENCOUNTER — OFFICE VISIT (OUTPATIENT)
Dept: DIABETES | Facility: CLINIC | Age: 67
End: 2022-12-21
Payer: MEDICARE

## 2022-12-21 DIAGNOSIS — E11.51 TYPE II DIABETES MELLITUS WITH PERIPHERAL CIRCULATORY DISORDER: Primary | ICD-10-CM

## 2022-12-21 DIAGNOSIS — E78.5 HYPERLIPIDEMIA, UNSPECIFIED HYPERLIPIDEMIA TYPE: ICD-10-CM

## 2022-12-21 DIAGNOSIS — I10 HYPERTENSION, UNSPECIFIED TYPE: ICD-10-CM

## 2022-12-21 PROCEDURE — 3051F HG A1C>EQUAL 7.0%<8.0%: CPT | Mod: CPTII,95,, | Performed by: NURSE PRACTITIONER

## 2022-12-21 PROCEDURE — 3066F NEPHROPATHY DOC TX: CPT | Mod: CPTII,95,, | Performed by: NURSE PRACTITIONER

## 2022-12-21 PROCEDURE — 1159F PR MEDICATION LIST DOCUMENTED IN MEDICAL RECORD: ICD-10-PCS | Mod: CPTII,95,, | Performed by: NURSE PRACTITIONER

## 2022-12-21 PROCEDURE — 3051F PR MOST RECENT HEMOGLOBIN A1C LEVEL 7.0 - < 8.0%: ICD-10-PCS | Mod: CPTII,95,, | Performed by: NURSE PRACTITIONER

## 2022-12-21 PROCEDURE — 1160F PR REVIEW ALL MEDS BY PRESCRIBER/CLIN PHARMACIST DOCUMENTED: ICD-10-PCS | Mod: CPTII,95,, | Performed by: NURSE PRACTITIONER

## 2022-12-21 PROCEDURE — 3061F NEG MICROALBUMINURIA REV: CPT | Mod: CPTII,95,, | Performed by: NURSE PRACTITIONER

## 2022-12-21 PROCEDURE — 4010F PR ACE/ARB THEARPY RXD/TAKEN: ICD-10-PCS | Mod: CPTII,95,, | Performed by: NURSE PRACTITIONER

## 2022-12-21 PROCEDURE — 1160F RVW MEDS BY RX/DR IN RCRD: CPT | Mod: CPTII,95,, | Performed by: NURSE PRACTITIONER

## 2022-12-21 PROCEDURE — 99214 PR OFFICE/OUTPT VISIT, EST, LEVL IV, 30-39 MIN: ICD-10-PCS | Mod: 95,,, | Performed by: NURSE PRACTITIONER

## 2022-12-21 PROCEDURE — 3061F PR NEG MICROALBUMINURIA RESULT DOCUMENTED/REVIEW: ICD-10-PCS | Mod: CPTII,95,, | Performed by: NURSE PRACTITIONER

## 2022-12-21 PROCEDURE — 1159F MED LIST DOCD IN RCRD: CPT | Mod: CPTII,95,, | Performed by: NURSE PRACTITIONER

## 2022-12-21 PROCEDURE — 3066F PR DOCUMENTATION OF TREATMENT FOR NEPHROPATHY: ICD-10-PCS | Mod: CPTII,95,, | Performed by: NURSE PRACTITIONER

## 2022-12-21 PROCEDURE — 99214 OFFICE O/P EST MOD 30 MIN: CPT | Mod: 95,,, | Performed by: NURSE PRACTITIONER

## 2022-12-21 PROCEDURE — 4010F ACE/ARB THERAPY RXD/TAKEN: CPT | Mod: CPTII,95,, | Performed by: NURSE PRACTITIONER

## 2022-12-21 NOTE — PATIENT INSTRUCTIONS
Medication Regimen:   Continue Trulicity 4.5 mg weekly  Continue Glipizide 10 tablet, 1 tablet before breakfast and 1 tablet before dinner, may do half tablet if eating lighter meals  Continue Lantus 60 units in the morning  Continue Metformin 500 mg, 2 tablets twice daily with meals    Patient Instructions:  Carbohydrate Count: 30-45 grams/meal and 15 grams/snack with limit of 2 snacks per day.  Exercise: Goal is 150 minutes or more per week.  Bring meter and blood sugar log to each appointment.     Goals for Blood Sugar:  Fastin-130 mg/dl  2 hours after meal:  mg/dl  Before Bed: 100-150 mg/dl  If Blood sugar is below 70 mg/dl, DO NOT take diabetes medication. Eat first and then recheck blood sugar in 20 minutes.  Foods to eat if blood sugar is below 70 mg/dl  1/2 glass of orange juice   1/2 regular cola can   3-4 hard candies   3-4 small glucose tabs.   Foods to eat if blood sugar is below 50 mg/dl  1 glass of orange juice  1 regular cola can  8 hard candies  8 small glucose tabs.   If you have repeated eating/blood sugar recheck process 2 times and blood sugar still below 70 mg/dl, call 911.

## 2022-12-21 NOTE — PROGRESS NOTES
Established Patient - Virtual Telehealth Visit     The patient location is: Home ([place])  The chief complaint leading to consultation is: Diabetes Follow-up  Visit type: Virtual visit with synchronous audio and video via Epic Haiku jessie  Total time spent with patient: 15 minutes     Each patient to whom I provide medical services by telemedicine is:  (1) informed of the relationship between the physician and patient and the respective role of any other health care provider with respect to management of the patient; and (2) notified that they may decline to receive medical services by telemedicine and may withdraw from such care at any time. Patient verbally consented to receive this service via voice-only telephone call.    PCP: Wanda Cook DO    Subjective:     Chief Complaint: Diabetes follow up.  Last visit with me: 12/8/2022    HPI: 66 y.o.  female for diabetes follow up.   Type II diabetes for > 10 years.  Comorbidities: HTN, HLD, CAD, S/p CVA (2010), Left Hemiparesis, Hypothyroidism and Overweight by BMI   Daughter with patient during visit.    Family History of Type 2 DM: father  Known diabetes complications:  DKA/HHS: no  Retinopathy: no  Peripheral neuropathy: no  Nephropathy: no    Interval history:  Daughter requesting follow up visit.  BG reading at 523 few days ago at midmorning.  Patient admits to eating regular cookies that morning.  Daughter states had 40 grams of sugar.  Daughter gave 70 units Lantus.  BG reading 219 approximately an hour after.    At last visit, Trulicity dose increased.  Tolerating well, taken 1 dose only.    Denies recent hospital admissions or ER visits.   Denies having hypoglycemia.  Endorses diabetes related symptoms: None.    Blood glucose monitoring via fingerstick: 2-3 x/day   Per patient's review of BG log, over the last 1 week   Fasting , 523, 108, 134, 120, 132, 128   PP breakfast 148   AC lunch 185, 163, 172   PP lunch 218  AC dinner 146,  162, 182, 193, 183    Preprandial readings above goal, overall BG reading at goal.    Activity level: Sedentary  Meal Planning:3 meals/day;1 snacks/day.    Social history:    - Single, Lives with daughter, grandchild    Medication compliance all of the time, diet compliance most of the time.   To be enrolled in diabetes education classes.     Previous regimen: Humalog 75/25    Past failed treatment: N/A    Current DM Medications:  Diabetes Medications               dulaglutide (TRULICITY) 4.5 mg/0.5 mL pen injector Inject 4.5 mg into the skin every 7 days.  Taken 1 dose    glipiZIDE (GLUCOTROL) 10 MG tablet TAKE ONE TABLET BY MOUTH TWO TIMES A DAY BEFORE MEALS  Taking after meals    LANTUS SOLOSTAR U-100 INSULIN glargine 100 units/mL SubQ pen Inject 60 Units into the skin once daily.    metFORMIN (GLUCOPHAGE-XR) 500 MG ER 24hr tablet Take 2 tablets (1,000 mg total) by mouth 2 (two) times daily with meals.     Allergies  Review of patient's allergies indicates:  No Known Allergies    Labs Reviewed:  Her most recent A1C is:  Lab Results   Component Value Date    HGBA1C 7.7 (H) 12/19/2022    HGBA1C 7.9 (H) 07/18/2022    HGBA1C 8.2 (H) 04/19/2022       Her most recent BMP is:  Lab Results   Component Value Date     07/18/2022    K 4.6 07/18/2022     07/18/2022    CO2 23 07/18/2022    BUN 25 (H) 07/18/2022    CREATININE 1.0 07/18/2022    CALCIUM 9.9 07/18/2022    ANIONGAP 10 07/18/2022    ESTGFRAFRICA >60.0 07/18/2022    EGFRNONAA 58.0 (A) 07/18/2022       No results found for: CPEPTIDE  No results found for: GLUTAMICACID    There is no height or weight on file to calculate BMI.    Wt Readings from Last 3 Encounters:   11/16/22 0809 95.9 kg (211 lb 6.7 oz)   09/06/22 1433 93.9 kg (207 lb)   08/26/22 0926 94.3 kg (207 lb 14.3 oz)        Her blood sugar in clinic today is: Not assessed due to type of visit.    Diabetes Management Status  Statin: Taking  ACE/ARB: Taking    Screening or Prevention Patient's value  Goal Complete/Controlled?   HgA1C Testing and Control   Lab Results   Component Value Date    HGBA1C 7.7 (H) 12/19/2022      Annually/Less than 8% Yes     Lipid profile : 07/18/2022 Annually Yes     LDL control Lab Results   Component Value Date    LDLCALC 42.0 (L) 07/18/2022    Annually/Less than 100 mg/dl  Yes     Nephropathy screening Lab Results   Component Value Date    MICALBCREAT 4.6 02/03/2022     No results found for: PROTEINUA Annually Yes     Blood pressure BP Readings from Last 1 Encounters:   11/16/22 138/70    Less than 140/90 Yes     Dilated retinal exam : 06/30/2022 Annually Yes    Foot exam   : 05/05/2022 Annually Yes       Social History     Socioeconomic History    Marital status:    Tobacco Use    Smoking status: Never    Smokeless tobacco: Never   Substance and Sexual Activity    Alcohol use: Not Currently     Past Medical History:   Diagnosis Date    Arthritis     Carotid artery disease     Dr. Jessa Szymanski--cardiology    Depression     DM II (diabetes mellitus, type II), controlled 12 years    Heart disease     Dr. Jessa Szymanski--cardiology    HTN (hypertension)     Hyperlipidemia     Hypothyroidism     Insomnia     Stroke 2010    Dr. Jessa Szymanski--cardiology    Vitamin D deficiency      Review of Systems   Constitutional: Negative for activity change, appetite change, fatigue and unexpected weight change.   HENT: Negative for dental problem and trouble swallowing.    Eyes: Negative for visual disturbance.   Respiratory: Negative for chest tightness and shortness of breath.    Cardiovascular: Negative for chest pain, palpitations and leg swelling.   Gastrointestinal: Negative for abdominal pain, constipation, diarrhea, nausea and vomiting.   Endocrine: Negative for polydipsia, polyphagia and polyuria.   Genitourinary: Negative for difficulty urinating, dysuria, frequency and urgency.        Negative yeast infection   Musculoskeletal: Positive for gait problem (r/t left sided weakness,  ambulates with quad cane). Negative for myalgias and neck pain.   Integumentary:  Negative for rash and wound.   Allergic/Immunologic: Negative.    Neurological: Positive for weakness (residual left sided secondary to CVA). Negative for dizziness, syncope, numbness and headaches.   Hematological: Negative.    Psychiatric/Behavioral: Negative for confusion and sleep disturbance.   All other systems reviewed and are negative.     Objective:      Physical Exam  Constitutional:       General: Awake.      Appearance: Normal appearance. Well-developed and well-groomed.   HENT:      Head: Normocephalic and atraumatic.   Eyes:      General: Lids are normal. Gaze aligned appropriately.   Pulmonary:      Effort: Pulmonary effort is normal. No respiratory distress.   Neurological:      Mental Status: Alert and oriented to person, place, and time.   Psychiatric:         Attention and Perception: Attention normal.         Mood and Affect: Mood and affect normal.         Speech: Speech normal.         Behavior: Behavior normal.         Thought Content: Thought content normal.         Cognition and Memory: Cognition normal.         Judgment: Judgment normal.      Assessment / Plan:     Diagnoses and all orders for this visit:    Type II diabetes mellitus with peripheral circulatory disorder    Hypertension, unspecified type    Hyperlipidemia, unspecified hyperlipidemia type     Additional Plan Details:  - Condition: Sub optimal control, most recent A1C 7.7 % was completed 1 week ago. Slightly improved from previous reading.  - Monitor blood glucose:  4x daily.Goals reviewed. Maintain BG log. Bring to next visit for review.  - Hypoglycemia protocol: Reviewed and risk discussed.  Notify if BG readings below 80. Protocol printout provided.   - Diet: Limit carbohydrate intake 30-45 grams/meal, 3x daily and 15 grams/snack, limit 2/day. Discussed alternative snack options vs cookies.  - Activity: Recommend at least 150 minutes of  exercise in a week.  - BP and LDL: Recommend lifestyle modifications and follow up with PCP for management.   - Medication Changes:   Continue Trulicity 4.5 mg weekly  Continue Glipizide 10 tablet, 1 tablet before breakfast and 1 tablet before dinner, may do half tablet if eating lighter meals. Discussed timing of administration.  Continue Lantus 60 units in the morning  Continue Metformin 500 mg, 2 tablets twice daily with meals    - Medication consideration: Titrate Trulicity/basal to goal BG. May also benefit from SGLT 2 inhibitor.  - Lab results: A1C discussed.  - Health Maintenance standards addressed today:  A1c scheduled.   - Risks of uncontrolled DM explained. Importance of routine foot and eye exams reviewed. Scheduled as appropriate.  -The patient was explained the above plan and given opportunity to ask questions.  She understands, chooses and consents to this plan and accepts all the risks, which include but are not limited to the risks mentioned above.   - Labs ordered as above.  - CDE referral: Scheduled  - Follow up: 3 months in clinic.        Zenaida Shetty, FNP-C [facility]    A total of 15 minutes was spent in face to face time, of which over 50 % was spent in counseling patient on disease process, complications, treatment, and side effects of medications.

## 2023-01-25 ENCOUNTER — OFFICE VISIT (OUTPATIENT)
Dept: PSYCHIATRY | Facility: CLINIC | Age: 68
End: 2023-01-25
Payer: MEDICARE

## 2023-01-25 DIAGNOSIS — Z86.73 HISTORY OF STROKE: ICD-10-CM

## 2023-01-25 DIAGNOSIS — E11.51 TYPE II DIABETES MELLITUS WITH PERIPHERAL CIRCULATORY DISORDER: ICD-10-CM

## 2023-01-25 DIAGNOSIS — F41.9 ANXIETY: ICD-10-CM

## 2023-01-25 DIAGNOSIS — F33.1 DEPRESSION, MAJOR, RECURRENT, MODERATE: Primary | ICD-10-CM

## 2023-01-25 DIAGNOSIS — I69.354 HEMIPARESIS AFFECTING LEFT SIDE AS LATE EFFECT OF CEREBROVASCULAR ACCIDENT: ICD-10-CM

## 2023-01-25 PROCEDURE — 99214 PR OFFICE/OUTPT VISIT, EST, LEVL IV, 30-39 MIN: ICD-10-PCS | Mod: 95,,, | Performed by: PSYCHIATRY & NEUROLOGY

## 2023-01-25 PROCEDURE — 4010F ACE/ARB THERAPY RXD/TAKEN: CPT | Mod: CPTII,95,, | Performed by: PSYCHIATRY & NEUROLOGY

## 2023-01-25 PROCEDURE — 3072F LOW RISK FOR RETINOPATHY: CPT | Mod: CPTII,95,, | Performed by: PSYCHIATRY & NEUROLOGY

## 2023-01-25 PROCEDURE — 4010F PR ACE/ARB THEARPY RXD/TAKEN: ICD-10-PCS | Mod: CPTII,95,, | Performed by: PSYCHIATRY & NEUROLOGY

## 2023-01-25 PROCEDURE — 3072F PR LOW RISK FOR RETINOPATHY: ICD-10-PCS | Mod: CPTII,95,, | Performed by: PSYCHIATRY & NEUROLOGY

## 2023-01-25 PROCEDURE — 99214 OFFICE O/P EST MOD 30 MIN: CPT | Mod: 95,,, | Performed by: PSYCHIATRY & NEUROLOGY

## 2023-01-25 RX ORDER — SERTRALINE HYDROCHLORIDE 100 MG/1
150 TABLET, FILM COATED ORAL DAILY
Qty: 45 TABLET | Refills: 3 | Status: SHIPPED | OUTPATIENT
Start: 2023-01-25 | End: 2023-05-15

## 2023-01-25 RX ORDER — ZOLPIDEM TARTRATE 5 MG/1
5 TABLET ORAL NIGHTLY PRN
Qty: 30 TABLET | Refills: 3 | Status: SHIPPED | OUTPATIENT
Start: 2023-01-25 | End: 2023-01-25

## 2023-01-25 RX ORDER — ZOLPIDEM TARTRATE 5 MG/1
5 TABLET ORAL NIGHTLY PRN
Qty: 30 TABLET | Refills: 3 | Status: SHIPPED | OUTPATIENT
Start: 2023-02-10 | End: 2023-01-25

## 2023-01-25 RX ORDER — SERTRALINE HYDROCHLORIDE 100 MG/1
150 TABLET, FILM COATED ORAL DAILY
Qty: 45 TABLET | Refills: 3 | Status: SHIPPED | OUTPATIENT
Start: 2023-01-25 | End: 2023-01-25

## 2023-01-25 RX ORDER — ZOLPIDEM TARTRATE 5 MG/1
5 TABLET ORAL NIGHTLY PRN
Qty: 30 TABLET | Refills: 3 | Status: SHIPPED | OUTPATIENT
Start: 2023-02-10 | End: 2023-08-29 | Stop reason: SDUPTHER

## 2023-01-25 NOTE — PROGRESS NOTES
Telehealth Session Info    The patient location is: Patient's home in [place], [place] .  Patient reported that his/her location at the time of this visit was in the [facility]     I also informed patient of the following:     Mario Alberto Pierre MD  is an employee of [facility] [place], [place]      Each patient to whom he or she provides medical services by telemedicine is: (1) informed of the relationship between the physician and patient and the respective role of any other health care provider with respect to management of the patient; and (2) notified that he or she may decline to receive medical services by telemedicine and may withdraw from such care at any time.    If technology issues arise during call,  pt informed that MD will attempt to call pt back / as well:  pt may call office phone: [phone] in attempt to reconnect.    If pt has questions related to privacy practices or records: pt instructed to contact [facility] Information Department: [phone]    Pt informed that IF acute concerns to: Dial 911 or go to nearest Emergency Room (ER)    Understanding Expressed    Who present: pt and daughter Susan, 39 yr old (lives with her); susan's son who 15 yr old also lives in home     Note 1st follow up with Xochitl CAMPO /  pt has history STROKE. / pt and  39 yr old daughter live together.     Ms Manuel was seen for initial eval aug 2022 / was told return Sept 2022 tho daughter says that they were dealing with potential move that eventually did not occur and had challenges from such. Desires to continue meds as prior . /      Krysta Manuel   1955 01/25/2023       Disclaimer: Evaluation and treatment is based on information presented to date. Any new information may affect assessment and findings.        S: Patient's Own Perception of Condition (& Side Effects) : no side effects     O:      CURRENT PRESENTATION:      Content    Speak a bit  (mild) slurred tho comprehendable. Goal  directed     Oriented x 3    Mood ok    Likes meds      Self Ratings:     GAD7 1/25/2023 8/17/2022   1. Feeling nervous, anxious, or on edge? 1 1   2. Not being able to stop or control worrying? 1 2   3. Worrying too much about different things? 1 2   4. Trouble relaxing? 2 1   5. Being so restless that it is hard to sit still? 0 1   6. Becoming easily annoyed or irritable? 0 1   7. Feeling afraid as if something awful might happen? 0 1   8. If you checked off any problems, how difficult have these problems made it for you to do your work, take care of things at home, or get along with other people? 1 1   DOREEN-7 Score 5 9      Depression Patient Health Questionnaire 1/25/2023 9/6/2022 8/17/2022 9/28/2021   Over the last two weeks how often have you been bothered by little interest or pleasure in doing things Several days Not at all Several days Several days   Over the last two weeks how often have you been bothered by feeling down, depressed or hopeless Several days Not at all More than half the days Several days   PHQ-2 Total Score 2 0 3 2   Over the last two weeks how often have you been bothered by trouble falling or staying asleep, or sleeping too much More than half the days - More than half the days More than half the days   Over the last two weeks how often have you been bothered by feeling tired or having little energy Several days - Several days Several days   Over the last two weeks how often have you been bothered by a poor appetite or overeating Several days - Several days Not at all   Over the last two weeks how often have you been bothered by feeling bad about yourself - or that you are a failure or have let yourself or your family down More than half the days - Several days Not at all   Over the last two weeks how often have you been bothered by trouble concentrating on things, such as reading the newspaper or watching television Several days - More than half the days Several days   Over the last two  weeks how often have you been bothered by moving or speaking so slowly that other people could have noticed. Or the opposite - being so fidgety or restless that you have been moving around a lot more than usual. Several days - More than half the days Not at all   Over the last two weeks how often have you been bothered by thoughts that you would be better off dead, or of hurting yourself Not at all - Not at all Not at all   If you checked off any problems, how difficult have these problems made it for you to do your work, take care of things at home or get along with other people? Somewhat difficult - Somewhat difficult Not difficult at all   Total Score 10 - 12 6   Interpretation Moderate - Moderate Mild         Constitutional Health Concerns:      Review of Systems   Constitutional: Negative.    HENT: Negative.     Eyes: Negative.    Respiratory: Negative.     Cardiovascular:         HTN    Gastrointestinal: Negative.    Genitourinary: Negative.    Musculoskeletal: Negative.    Skin: Negative.    Neurological:         History Stroke   Endo/Heme/Allergies: Negative.      Wt Readings from Last 12 Encounters:   11/16/22 95.9 kg (211 lb 6.7 oz)   09/06/22 93.9 kg (207 lb)   08/26/22 94.3 kg (207 lb 14.3 oz)   07/14/22 94.2 kg (207 lb 10.8 oz)   06/30/22 93.3 kg (205 lb 11 oz)   06/06/22 93.3 kg (205 lb 11 oz)   05/26/22 94.7 kg (208 lb 12.4 oz)   04/26/22 96 kg (211 lb 10.3 oz)   03/31/22 93.9 kg (207 lb 0.2 oz)   01/31/22 93.9 kg (207 lb 0.2 oz)   12/29/21 94.8 kg (209 lb)   12/29/21 94.8 kg (209 lb)      Laboratory Data   Date/Time Component Value Flag Lab Status   07/18/22 0937 TSH 2.804 -- Final result   07/18/22 0937 HDL 36 Important  Low Final result   07/18/22 0937 CHOL 90 Important  Low Final result   07/18/22 0937 TRIG 60 -- Final result   07/18/22 0937 LDLCALC 42.0 Important  Low Final result   07/18/22 0937 CHOLHDL 40.0 -- Final result   07/18/22 0937 NONHDLCHOL 54 -- Final result   07/18/22 0937  TOTALCHOLEST 2.5 -- Final result   12/19/22 1115 HGBA1C 7.7 Important  High Final result   01/06/19 0000 LABA1C 14.4 -- Final result   12/29/21 1124 COLORU Yellow -- Final result   12/29/21 1124 SPECGRAV 1.025 -- Final result   12/29/21 1124 PHUR 6.0 -- Final result   12/29/21 1124 GLUCOSEUR Trace -- Final result   12/29/21 1124 KETONESU Neg -- Final result   12/29/21 1124 NITRITE Neg -- Final result   12/29/21 1124 WBCUR Neg -- Final result   12/29/21 1124 UROBILINOGEN 1.0 -- Final result   12/29/21 1124 RBCUR Neg -- Final result   09/06/22 1449 POCGLU 152 Important  Abnormal Final result   12/29/21 1133 WBC 10.1 -- Final result   12/29/21 1133 RBC 4.38 -- Final result   12/29/21 1133 HGB 11.7 -- Final result   12/29/21 1133 HCT 36.4 -- Final result   12/29/21 1133 MCH 26.7 Important  Low Final result   12/29/21 1133 RDW 14.1 -- Final result   12/29/21 1133  -- Final result   12/29/21 1133 MPV 10.3 -- Final result   05/25/21 1539 LYMPHS 24 -- Final result   07/18/22 0937  -- Final result   07/18/22 0937 BUN 25 Important  High Final result   07/18/22 0937 CREATININE 1.0 -- Final result   07/18/22 0937 CALCIUM 9.9 -- Final result   07/18/22 0937  -- Final result   07/18/22 0937 K 4.6 -- Final result   07/18/22 0937  -- Final result   07/18/22 0937 PROT 6.9 -- Final result   07/18/22 0937 ALBUMIN 3.7 -- Final result   07/18/22 0937 BILITOT 0.3 -- Final result   07/18/22 0937 AST 17 -- Final result   07/18/22 0937 ALKPHOS 63 -- Final result   07/18/22 0937 CO2 23 -- Final result   07/18/22 0937 ALT 21 -- Final result   07/18/22 0937 ANIONGAP 10 -- Final result   07/18/22 0937 EGFRNONAA 58.0 Important  Abnormal Final result   07/18/22 0937 ESTGFRAFRICA >60.0 -- Final result   11/16/20 1513 MG 1.8 -- Final result   12/29/21 1133 MCV 83.1 -- Final result       Mental Status Exam:      Appearance: casual   Oriented: x 3   Attitude: cooperative   Eye Contact: good  Behavior: calm     Mood: says  feeling better  Cognition: alert  Concentration: grossly intact   Affect: appropriate range      Anxiety: mild     Thought Process: goal directed     Speech:       Volume : WNL       Quantity WNL       Quality: appears to openly answer questions      Threats: no SI / no HI     Psychosis: denies all      Estimate of Intellectual Function: average   Impulse Control: no thoughts of harm to self/ others      Musculoskeletal:  no tremor     Social: : pt and daughter Susan, 39 yr old (lives with her); susan's son who 15 yr old also lives in home     Patient Active Problem List   Diagnosis    CVA (cerebral vascular accident)    History of stroke    Vitamin D deficiency    Hypothyroidism (acquired)    Stroke    Insomnia    Hyperlipidemia    Hypertension    Heart disease    Uncontrolled type 2 diabetes mellitus with hyperglycemia, with long-term current use of insulin    Depression    Arthritis    Type II diabetes mellitus with peripheral circulatory disorder    Class 2 severe obesity due to excess calories with serious comorbidity and body mass index (BMI) of 38.0 to 38.9 in adult    Major depression in remission    Hemiparesis affecting left side as late effect of cerebrovascular accident    Dementia without behavioral disturbance    BMI 36.0-36.9,adult    Depression, major, recurrent, moderate    Anxiety          Current Outpatient Medications:     aspirin (ECOTRIN) 81 MG EC tablet, Take 1 tablet (81 mg total) by mouth once daily., Disp: 90 tablet, Rfl: 1    blood sugar diagnostic Strp, To use 3 times daily, Disp: 200 strip, Rfl: 10    carvediloL (COREG) 25 MG tablet, Take 25 mg by mouth 2 (two) times daily., Disp: , Rfl:     clopidogreL (PLAVIX) 75 mg tablet, Take 75 mg by mouth once daily., Disp: , Rfl:     diltiaZEM (DILACOR XR) 240 MG CDCR, Take 240 mg by mouth once daily., Disp: , Rfl:     dulaglutide (TRULICITY) 4.5 mg/0.5 mL pen injector, Inject 4.5 mg into the skin every 7 days., Disp: 4 pen, Rfl: 6     "EASY TOUCH LANCING DEVICE Misc, , Disp: , Rfl:     fluticasone propionate (FLONASE) 50 mcg/actuation nasal spray, 2 SPRAYS IN EACH NOSTRIL EVERY DAY AS NEEDED, Disp: 16 g, Rfl: 1    glipiZIDE (GLUCOTROL) 10 MG tablet, TAKE ONE TABLET BY MOUTH TWO TIMES A DAY BEFORE MEALS, Disp: 60 tablet, Rfl: 3    hydroCHLOROthiazide (HYDRODIURIL) 25 MG tablet, Take 1 tablet (25 mg total) by mouth once daily., Disp: 30 tablet, Rfl: 5    HYDROcodone-acetaminophen (NORCO)  mg per tablet, , Disp: , Rfl: 0    irbesartan (AVAPRO) 300 MG tablet, Take 1 tablet (300 mg total) by mouth every evening., Disp: 30 tablet, Rfl: 5    lancing device with lancets Kit, 1 each by Misc.(Non-Drug; Combo Route) route 3 (three) times daily., Disp: 200 each, Rfl: 10    LANTUS SOLOSTAR U-100 INSULIN glargine 100 units/mL SubQ pen, Inject 60 Units into the skin once daily., Disp: 30 mL, Rfl: 3    levothyroxine (SYNTHROID) 125 MCG tablet, Take 1 tablet (125 mcg total) by mouth before breakfast., Disp: 30 tablet, Rfl: 11    linaCLOtide (LINZESS) 145 mcg Cap capsule, Take 1 capsule (145 mcg total) by mouth once daily., Disp: 90 capsule, Rfl: 1    memantine (NAMENDA) 10 MG Tab, Take 1 tablet (10 mg total) by mouth 2 (two) times daily., Disp: 180 tablet, Rfl: 1    metFORMIN (GLUCOPHAGE-XR) 500 MG ER 24hr tablet, Take 2 tablets (1,000 mg total) by mouth 2 (two) times daily with meals., Disp: 360 tablet, Rfl: 1    ondansetron (ZOFRAN-ODT) 4 MG TbDL, Take 4 mg by mouth every 8 (eight) hours as needed., Disp: , Rfl:     rosuvastatin (CRESTOR) 10 MG tablet, Take 1 tablet (10 mg total) by mouth nightly., Disp: 90 tablet, Rfl: 1    sertraline (ZOLOFT) 100 MG tablet, Take 1.5 tablets (150 mg total) by mouth once daily., Disp: 45 tablet, Rfl: 3    SURE COMFORT PEN NEEDLE 32 gauge x 5/32" Ndle, , Disp: , Rfl:     [START ON 2/10/2023] zolpidem (AMBIEN) 5 MG Tab, Take 1 tablet (5 mg total) by mouth nightly as needed (INSOMNIA)., Disp: 30 tablet, Rfl: 3     Social " History     Tobacco Use   Smoking Status Never   Smokeless Tobacco Never        Review of patient's allergies indicates:  No Known Allergies     ASSESSMENT:   Encounter Diagnoses   Name Primary?    Depression, major, recurrent, moderate Yes    History of stroke (pt reports CVA about 2010)     Anxiety     Hemiparesis affecting left side as late effect of cerebrovascular accident     Type II diabetes mellitus with peripheral circulatory disorder        Patient Instructions     PLAN:   D Post Xochitl CAMPO informed pt that he is moving to [facility] March 2023; noted that [facility]  will reassign pt  to another [place] based prescriber.     Informed pt that IF for some reason that no prescriber appt  in place by late Feb / (no later than March 1 2023) THEN pt needs to message or call [facility]  ([phone]) to ask to getting  on schedule for  [place] [facility] prescriber Understanding Expressed      Meds: pt / daughter ask to move  ambien down from 10 mg at bed to move to Ambien 5 mg tab prn insomnia #30 x / such done ; also renew as prior zoloft 100 mg tab (1 and 1/2 tabs at bed)      References:     Relaxation stress reduction workbook: MANI Nelson PhD ( used: $7-10)    Feeling Good Website: Mario Alberto Broussard MD / [email or web address] website (free) / kerri. PODCASTS    Anxiety &  phobia workbook by FRANK Vazquez PhD  (web retailers: used: $ 7-10)    [place]: Path to Better Sleep : https://www.veterantraining.[place].gov/insomnia/ (free)       Pt expressed appreciation for the visit today and did not have further question at this time though pt  was still informed to:     Call  if problems.    Call / Report Side Effects to Xochitl CAMPO     Encouraged to follow up with primary care / Gen Med MD for continued monitoring of general health and wellness.    Understanding was expressed; and no further concerns nor questions were raised at this time.     Remember healthy self care:    eat right  attempt adequate rest   HANDWASHING / encourage such kerri. During this corona virus time   walk or light exercise within reason and as your general med team approves  read or explore any of reference materials / homework mentioned  reach out (I.e.,  connect with)  others who nuture and bring out best in you  avoid risky behaviors    Keep your appointments:    IF you  cannot make your appt THEN please call [phone] or go online (via My Chart jessie) to reschedule.    It is the responsibility of the patient to reschedule an appointment if an appointment has been canceled or missed.    Avoid  alcohol and illicit substances.  Look for the positive.  All is often relative-seek balance  Call sooner if needed : [phone]   Call 911 or go to Emergency Room  (ER)  if  any acute concerns

## 2023-01-25 NOTE — PATIENT INSTRUCTIONS
PLAN:   D Post Xochitl CAMPO informed pt that he is moving to [facility] March 2023; noted that [facility]  will reassign pt  to another [place] based prescriber.     Informed pt that IF for some reason that no prescriber appt  in place by late Feb / (no later than March 1 2023) THEN pt needs to message or call [facility]  ([phone]) to ask to getting  on schedule for  [place] [facility] prescriber Understanding Expressed      Meds: pt / daughter ask to move  ambien down from 10 mg at bed to move to Ambien 5 mg tab prn insomnia #30 x / such done ; also renew as prior zoloft 100 mg tab (1 and 1/2 tabs at bed)      References:     Relaxation stress reduction workbook: MANI Nelson PhD ( used: $7-10)    Feeling Good Website: Mario Alberto Broussard MD / [email or web address] website (free) / kerri. PODCASTS    Anxiety &  phobia workbook by FRANK Vazquez PhD  (web retailers: used: $ 7-10)    [place]: Path to Better Sleep : https://www.veterantraining.[place]gov/insomnia/ (free)       Pt expressed appreciation for the visit today and did not have further question at this time though pt  was still informed to:     Call  if problems.    Call / Report Side Effects to Xochitl CAMPO     Encouraged to follow up with primary care / Gen Med MD for continued monitoring of general health and wellness.    Understanding was expressed; and no further concerns nor questions were raised at this time.     Remember healthy self care:   eat right  attempt adequate rest   HANDWASHING / encourage such kerri. During this corona virus time   walk or light exercise within reason and as your general med team approves  read or explore any of reference materials / homework mentioned  reach out (I.e.,  connect with)  others who nuture and bring out best in you  avoid risky behaviors    Keep your appointments:    IF you  cannot make your appt THEN please call [phone] or go online (via My Chart jessie) to  reschedule.    It is the responsibility of the patient to reschedule an appointment if an appointment has been canceled or missed.    Avoid  alcohol and illicit substances.  Look for the positive.  All is often relative-seek balance  Call sooner if needed : [phone]   Call 911 or go to Emergency Room  (ER)  if  any acute concerns

## 2023-02-13 ENCOUNTER — LAB VISIT (OUTPATIENT)
Dept: LAB | Facility: HOSPITAL | Age: 68
End: 2023-02-13
Attending: PHYSICIAN ASSISTANT
Payer: MEDICARE

## 2023-02-13 ENCOUNTER — OFFICE VISIT (OUTPATIENT)
Dept: INTERNAL MEDICINE | Facility: CLINIC | Age: 68
End: 2023-02-13
Payer: MEDICARE

## 2023-02-13 VITALS
WEIGHT: 211.44 LBS | HEART RATE: 89 BPM | HEIGHT: 63 IN | RESPIRATION RATE: 19 BRPM | BODY MASS INDEX: 37.46 KG/M2 | TEMPERATURE: 98 F | OXYGEN SATURATION: 98 % | DIASTOLIC BLOOD PRESSURE: 72 MMHG | SYSTOLIC BLOOD PRESSURE: 134 MMHG

## 2023-02-13 DIAGNOSIS — I10 HYPERTENSION, UNSPECIFIED TYPE: ICD-10-CM

## 2023-02-13 DIAGNOSIS — I69.354 HEMIPARESIS AFFECTING LEFT SIDE AS LATE EFFECT OF CEREBROVASCULAR ACCIDENT: ICD-10-CM

## 2023-02-13 DIAGNOSIS — Z79.4 UNCONTROLLED TYPE 2 DIABETES MELLITUS WITH HYPERGLYCEMIA, WITH LONG-TERM CURRENT USE OF INSULIN: ICD-10-CM

## 2023-02-13 DIAGNOSIS — E78.5 HYPERLIPIDEMIA, UNSPECIFIED HYPERLIPIDEMIA TYPE: ICD-10-CM

## 2023-02-13 DIAGNOSIS — K59.09 CHRONIC CONSTIPATION: ICD-10-CM

## 2023-02-13 DIAGNOSIS — E11.65 UNCONTROLLED TYPE 2 DIABETES MELLITUS WITH HYPERGLYCEMIA, WITH LONG-TERM CURRENT USE OF INSULIN: ICD-10-CM

## 2023-02-13 DIAGNOSIS — R19.5 DARK STOOLS: Primary | ICD-10-CM

## 2023-02-13 LAB
ALBUMIN SERPL BCP-MCNC: 3.6 G/DL (ref 3.5–5.2)
ALP SERPL-CCNC: 70 U/L (ref 55–135)
ALT SERPL W/O P-5'-P-CCNC: 18 U/L (ref 10–44)
ANION GAP SERPL CALC-SCNC: 10 MMOL/L (ref 8–16)
AST SERPL-CCNC: 15 U/L (ref 10–40)
BASOPHILS # BLD AUTO: 0.04 K/UL (ref 0–0.2)
BASOPHILS NFR BLD: 0.5 % (ref 0–1.9)
BILIRUB SERPL-MCNC: 0.4 MG/DL (ref 0.1–1)
BUN SERPL-MCNC: 14 MG/DL (ref 8–23)
CALCIUM SERPL-MCNC: 10.1 MG/DL (ref 8.7–10.5)
CHLORIDE SERPL-SCNC: 105 MMOL/L (ref 95–110)
CHOLEST SERPL-MCNC: 94 MG/DL (ref 120–199)
CHOLEST/HDLC SERPL: 2.8 {RATIO} (ref 2–5)
CO2 SERPL-SCNC: 25 MMOL/L (ref 23–29)
CREAT SERPL-MCNC: 0.9 MG/DL (ref 0.5–1.4)
DIFFERENTIAL METHOD: ABNORMAL
EOSINOPHIL # BLD AUTO: 0.2 K/UL (ref 0–0.5)
EOSINOPHIL NFR BLD: 2.5 % (ref 0–8)
ERYTHROCYTE [DISTWIDTH] IN BLOOD BY AUTOMATED COUNT: 14.7 % (ref 11.5–14.5)
EST. GFR  (NO RACE VARIABLE): >60 ML/MIN/1.73 M^2
ESTIMATED AVG GLUCOSE: 154 MG/DL (ref 68–131)
GLUCOSE SERPL-MCNC: 95 MG/DL (ref 70–110)
HBA1C MFR BLD: 7 % (ref 4–5.6)
HCT VFR BLD AUTO: 36.3 % (ref 37–48.5)
HDLC SERPL-MCNC: 33 MG/DL (ref 40–75)
HDLC SERPL: 35.1 % (ref 20–50)
HGB BLD-MCNC: 10.9 G/DL (ref 12–16)
IMM GRANULOCYTES # BLD AUTO: 0.02 K/UL (ref 0–0.04)
IMM GRANULOCYTES NFR BLD AUTO: 0.2 % (ref 0–0.5)
LDLC SERPL CALC-MCNC: 46 MG/DL (ref 63–159)
LYMPHOCYTES # BLD AUTO: 1.9 K/UL (ref 1–4.8)
LYMPHOCYTES NFR BLD: 21.3 % (ref 18–48)
MCH RBC QN AUTO: 26.1 PG (ref 27–31)
MCHC RBC AUTO-ENTMCNC: 30 G/DL (ref 32–36)
MCV RBC AUTO: 87 FL (ref 82–98)
MONOCYTES # BLD AUTO: 0.7 K/UL (ref 0.3–1)
MONOCYTES NFR BLD: 7.6 % (ref 4–15)
NEUTROPHILS # BLD AUTO: 5.9 K/UL (ref 1.8–7.7)
NEUTROPHILS NFR BLD: 67.9 % (ref 38–73)
NONHDLC SERPL-MCNC: 61 MG/DL
NRBC BLD-RTO: 0 /100 WBC
PLATELET # BLD AUTO: 252 K/UL (ref 150–450)
PMV BLD AUTO: 10.6 FL (ref 9.2–12.9)
POTASSIUM SERPL-SCNC: 4.2 MMOL/L (ref 3.5–5.1)
PROT SERPL-MCNC: 7.4 G/DL (ref 6–8.4)
RBC # BLD AUTO: 4.17 M/UL (ref 4–5.4)
SODIUM SERPL-SCNC: 140 MMOL/L (ref 136–145)
TRIGL SERPL-MCNC: 75 MG/DL (ref 30–150)
WBC # BLD AUTO: 8.69 K/UL (ref 3.9–12.7)

## 2023-02-13 PROCEDURE — 4010F PR ACE/ARB THEARPY RXD/TAKEN: ICD-10-PCS | Mod: CPTII,S$GLB,, | Performed by: PHYSICIAN ASSISTANT

## 2023-02-13 PROCEDURE — 99999 PR PBB SHADOW E&M-EST. PATIENT-LVL V: ICD-10-PCS | Mod: PBBFAC,,, | Performed by: PHYSICIAN ASSISTANT

## 2023-02-13 PROCEDURE — 3288F FALL RISK ASSESSMENT DOCD: CPT | Mod: CPTII,S$GLB,, | Performed by: PHYSICIAN ASSISTANT

## 2023-02-13 PROCEDURE — 3080F PR MOST RECENT DIASTOLIC BLOOD PRESSURE >= 90 MM HG: ICD-10-PCS | Mod: CPTII,S$GLB,, | Performed by: PHYSICIAN ASSISTANT

## 2023-02-13 PROCEDURE — 85025 COMPLETE CBC W/AUTO DIFF WBC: CPT | Performed by: PHYSICIAN ASSISTANT

## 2023-02-13 PROCEDURE — 1101F PR PT FALLS ASSESS DOC 0-1 FALLS W/OUT INJ PAST YR: ICD-10-PCS | Mod: CPTII,S$GLB,, | Performed by: PHYSICIAN ASSISTANT

## 2023-02-13 PROCEDURE — 1159F PR MEDICATION LIST DOCUMENTED IN MEDICAL RECORD: ICD-10-PCS | Mod: CPTII,S$GLB,, | Performed by: PHYSICIAN ASSISTANT

## 2023-02-13 PROCEDURE — 83036 HEMOGLOBIN GLYCOSYLATED A1C: CPT | Performed by: PHYSICIAN ASSISTANT

## 2023-02-13 PROCEDURE — 36415 COLL VENOUS BLD VENIPUNCTURE: CPT | Performed by: PHYSICIAN ASSISTANT

## 2023-02-13 PROCEDURE — 3077F PR MOST RECENT SYSTOLIC BLOOD PRESSURE >= 140 MM HG: ICD-10-PCS | Mod: CPTII,S$GLB,, | Performed by: PHYSICIAN ASSISTANT

## 2023-02-13 PROCEDURE — 99999 PR PBB SHADOW E&M-EST. PATIENT-LVL V: CPT | Mod: PBBFAC,,, | Performed by: PHYSICIAN ASSISTANT

## 2023-02-13 PROCEDURE — 1126F PR PAIN SEVERITY QUANTIFIED, NO PAIN PRESENT: ICD-10-PCS | Mod: CPTII,S$GLB,, | Performed by: PHYSICIAN ASSISTANT

## 2023-02-13 PROCEDURE — 1159F MED LIST DOCD IN RCRD: CPT | Mod: CPTII,S$GLB,, | Performed by: PHYSICIAN ASSISTANT

## 2023-02-13 PROCEDURE — 3072F PR LOW RISK FOR RETINOPATHY: ICD-10-PCS | Mod: CPTII,S$GLB,, | Performed by: PHYSICIAN ASSISTANT

## 2023-02-13 PROCEDURE — 80061 LIPID PANEL: CPT | Performed by: PHYSICIAN ASSISTANT

## 2023-02-13 PROCEDURE — 1126F AMNT PAIN NOTED NONE PRSNT: CPT | Mod: CPTII,S$GLB,, | Performed by: PHYSICIAN ASSISTANT

## 2023-02-13 PROCEDURE — 1101F PT FALLS ASSESS-DOCD LE1/YR: CPT | Mod: CPTII,S$GLB,, | Performed by: PHYSICIAN ASSISTANT

## 2023-02-13 PROCEDURE — 80053 COMPREHEN METABOLIC PANEL: CPT | Performed by: PHYSICIAN ASSISTANT

## 2023-02-13 PROCEDURE — 3008F PR BODY MASS INDEX (BMI) DOCUMENTED: ICD-10-PCS | Mod: CPTII,S$GLB,, | Performed by: PHYSICIAN ASSISTANT

## 2023-02-13 PROCEDURE — 1160F PR REVIEW ALL MEDS BY PRESCRIBER/CLIN PHARMACIST DOCUMENTED: ICD-10-PCS | Mod: CPTII,S$GLB,, | Performed by: PHYSICIAN ASSISTANT

## 2023-02-13 PROCEDURE — 99214 PR OFFICE/OUTPT VISIT, EST, LEVL IV, 30-39 MIN: ICD-10-PCS | Mod: S$GLB,,, | Performed by: PHYSICIAN ASSISTANT

## 2023-02-13 PROCEDURE — 3008F BODY MASS INDEX DOCD: CPT | Mod: CPTII,S$GLB,, | Performed by: PHYSICIAN ASSISTANT

## 2023-02-13 PROCEDURE — 3080F DIAST BP >= 90 MM HG: CPT | Mod: CPTII,S$GLB,, | Performed by: PHYSICIAN ASSISTANT

## 2023-02-13 PROCEDURE — 3288F PR FALLS RISK ASSESSMENT DOCUMENTED: ICD-10-PCS | Mod: CPTII,S$GLB,, | Performed by: PHYSICIAN ASSISTANT

## 2023-02-13 PROCEDURE — 3072F LOW RISK FOR RETINOPATHY: CPT | Mod: CPTII,S$GLB,, | Performed by: PHYSICIAN ASSISTANT

## 2023-02-13 PROCEDURE — 4010F ACE/ARB THERAPY RXD/TAKEN: CPT | Mod: CPTII,S$GLB,, | Performed by: PHYSICIAN ASSISTANT

## 2023-02-13 PROCEDURE — 3077F SYST BP >= 140 MM HG: CPT | Mod: CPTII,S$GLB,, | Performed by: PHYSICIAN ASSISTANT

## 2023-02-13 PROCEDURE — 1160F RVW MEDS BY RX/DR IN RCRD: CPT | Mod: CPTII,S$GLB,, | Performed by: PHYSICIAN ASSISTANT

## 2023-02-13 PROCEDURE — 99214 OFFICE O/P EST MOD 30 MIN: CPT | Mod: S$GLB,,, | Performed by: PHYSICIAN ASSISTANT

## 2023-02-13 NOTE — PROGRESS NOTES
"Subjective:      Patient ID: Krysta Manuel is a 67 y.o. female.    Chief Complaint: Memory Loss and Abdominal Pain    Patient is known to me, being seen today for stool change.  Reports darker more odorous stool x1mth.  Patient denies abdominal pain but daughter (present at appt) reports she does complain of abdominal pain at times.     PMH chronic constipation, on linzess which has helped     Dementia- on Namenda, referred to Neuro at last visit     Due for fasting labs     Last visit Nov 2022 with PCP     Review of Systems   Constitutional:  Positive for chills. Negative for diaphoresis and fever.   HENT:  Negative for congestion, rhinorrhea and sore throat.    Respiratory:  Negative for cough, shortness of breath and wheezing.    Gastrointestinal:  Positive for abdominal pain and blood in stool (dark stools with black specks). Negative for constipation, diarrhea, nausea and vomiting.   Genitourinary:  Negative for dyspareunia, dysuria, frequency and hematuria.        Reports h/o kidney stones, denies current urinary symptoms   Skin:  Negative for rash.   Neurological:  Positive for weakness (chronic L sided). Negative for dizziness, light-headedness and headaches.     Objective:   BP (!) 160/92   Pulse 89   Temp 98.1 °F (36.7 °C)   Resp 19   Ht 5' 3" (1.6 m)   Wt 95.9 kg (211 lb 6.7 oz)   SpO2 98%   BMI 37.45 kg/m²   Physical Exam  Constitutional:       General: She is not in acute distress.     Appearance: Normal appearance. She is well-developed. She is not ill-appearing.      Comments: Patient seated in wheel chair   HENT:      Head: Normocephalic and atraumatic.   Cardiovascular:      Rate and Rhythm: Normal rate and regular rhythm.      Heart sounds: Normal heart sounds. No murmur heard.  Pulmonary:      Effort: Pulmonary effort is normal. No respiratory distress.      Breath sounds: Normal breath sounds. No decreased breath sounds.   Abdominal:      General: Bowel sounds are normal.      " Palpations: Abdomen is soft.      Tenderness: There is no abdominal tenderness. There is no right CVA tenderness or left CVA tenderness.   Musculoskeletal:      Right lower leg: No edema.      Left lower leg: No edema.   Skin:     General: Skin is warm and dry.      Findings: No rash.   Neurological:      Motor: Weakness (L sided) present.   Psychiatric:         Speech: Speech normal.         Behavior: Behavior normal.         Thought Content: Thought content normal.     Assessment:      1. Dark stools    2. Chronic constipation    3. Hemiparesis affecting left side as late effect of cerebrovascular accident    4. Hypertension, unspecified type    5. Hyperlipidemia, unspecified hyperlipidemia type    6. Uncontrolled type 2 diabetes mellitus with hyperglycemia, with long-term current use of insulin       Plan:   Dark stools  -     Occult blood x 1, stool; Future; Expected date: 02/13/2023  -     Ambulatory referral/consult to Gastroenterology; Future; Expected date: 02/20/2023    Chronic constipation  -     Occult blood x 1, stool; Future; Expected date: 02/13/2023  -     Ambulatory referral/consult to Gastroenterology; Future; Expected date: 02/20/2023    Hemiparesis affecting left side as late effect of cerebrovascular accident    Hypertension, unspecified type  -     CBC Auto Differential; Future; Expected date: 02/13/2023  -     Comprehensive Metabolic Panel; Future; Expected date: 02/13/2023    Hyperlipidemia, unspecified hyperlipidemia type  -     Lipid Panel; Future; Expected date: 02/13/2023    Uncontrolled type 2 diabetes mellitus with hyperglycemia, with long-term current use of insulin  -     Microalbumin/creatinine urine ratio; Future; Expected date: 02/13/2023  -     Hemoglobin A1C; Future; Expected date: 02/13/2023      Work up per above  Monitor BP at home   1mth routine f/u     GI and Neuro appt to be scheduled     Discussed worsening signs/symptoms and when to return to clinic or go to ED.   Patient  expresses understanding and agrees with treatment plan.

## 2023-02-14 ENCOUNTER — TELEPHONE (OUTPATIENT)
Dept: INTERNAL MEDICINE | Facility: CLINIC | Age: 68
End: 2023-02-14
Payer: MEDICARE

## 2023-02-14 DIAGNOSIS — D50.9 MICROCYTIC ANEMIA: Primary | ICD-10-CM

## 2023-02-20 ENCOUNTER — PATIENT MESSAGE (OUTPATIENT)
Dept: PSYCHIATRY | Facility: CLINIC | Age: 68
End: 2023-02-20
Payer: MEDICARE

## 2023-02-20 ENCOUNTER — PATIENT MESSAGE (OUTPATIENT)
Dept: GASTROENTEROLOGY | Facility: CLINIC | Age: 68
End: 2023-02-20

## 2023-02-20 ENCOUNTER — LAB VISIT (OUTPATIENT)
Dept: LAB | Facility: HOSPITAL | Age: 68
End: 2023-02-20
Attending: PHYSICIAN ASSISTANT
Payer: MEDICARE

## 2023-02-20 ENCOUNTER — OFFICE VISIT (OUTPATIENT)
Dept: GASTROENTEROLOGY | Facility: CLINIC | Age: 68
End: 2023-02-20
Payer: MEDICARE

## 2023-02-20 VITALS
WEIGHT: 205 LBS | BODY MASS INDEX: 36.32 KG/M2 | HEIGHT: 63 IN | OXYGEN SATURATION: 98 % | SYSTOLIC BLOOD PRESSURE: 130 MMHG | DIASTOLIC BLOOD PRESSURE: 72 MMHG | HEART RATE: 86 BPM

## 2023-02-20 DIAGNOSIS — K59.03 CONSTIPATION DUE TO OPIOID THERAPY: Primary | ICD-10-CM

## 2023-02-20 DIAGNOSIS — T40.2X5A CONSTIPATION DUE TO OPIOID THERAPY: Primary | ICD-10-CM

## 2023-02-20 DIAGNOSIS — K63.89 NARCOTIC BOWEL SYNDROME: ICD-10-CM

## 2023-02-20 DIAGNOSIS — T40.601A NARCOTIC BOWEL SYNDROME: ICD-10-CM

## 2023-02-20 DIAGNOSIS — D50.9 MICROCYTIC ANEMIA: ICD-10-CM

## 2023-02-20 DIAGNOSIS — K59.09 CHRONIC CONSTIPATION: ICD-10-CM

## 2023-02-20 DIAGNOSIS — R19.5 DARK STOOLS: ICD-10-CM

## 2023-02-20 LAB
FERRITIN SERPL-MCNC: 40 NG/ML (ref 20–300)
IRON SERPL-MCNC: 67 UG/DL (ref 30–160)
SATURATED IRON: 16 % (ref 20–50)
TOTAL IRON BINDING CAPACITY: 410 UG/DL (ref 250–450)
TRANSFERRIN SERPL-MCNC: 277 MG/DL (ref 200–375)

## 2023-02-20 PROCEDURE — 1126F PR PAIN SEVERITY QUANTIFIED, NO PAIN PRESENT: ICD-10-PCS | Mod: CPTII,S$GLB,, | Performed by: INTERNAL MEDICINE

## 2023-02-20 PROCEDURE — 1160F RVW MEDS BY RX/DR IN RCRD: CPT | Mod: CPTII,S$GLB,, | Performed by: INTERNAL MEDICINE

## 2023-02-20 PROCEDURE — 99999 PR PBB SHADOW E&M-EST. PATIENT-LVL V: CPT | Mod: PBBFAC,,, | Performed by: INTERNAL MEDICINE

## 2023-02-20 PROCEDURE — 84466 ASSAY OF TRANSFERRIN: CPT | Performed by: PHYSICIAN ASSISTANT

## 2023-02-20 PROCEDURE — 82728 ASSAY OF FERRITIN: CPT | Performed by: PHYSICIAN ASSISTANT

## 2023-02-20 PROCEDURE — 3288F PR FALLS RISK ASSESSMENT DOCUMENTED: ICD-10-PCS | Mod: CPTII,S$GLB,, | Performed by: INTERNAL MEDICINE

## 2023-02-20 PROCEDURE — 3061F PR NEG MICROALBUMINURIA RESULT DOCUMENTED/REVIEW: ICD-10-PCS | Mod: CPTII,S$GLB,, | Performed by: INTERNAL MEDICINE

## 2023-02-20 PROCEDURE — 3061F NEG MICROALBUMINURIA REV: CPT | Mod: CPTII,S$GLB,, | Performed by: INTERNAL MEDICINE

## 2023-02-20 PROCEDURE — 4010F PR ACE/ARB THEARPY RXD/TAKEN: ICD-10-PCS | Mod: CPTII,S$GLB,, | Performed by: INTERNAL MEDICINE

## 2023-02-20 PROCEDURE — 1159F PR MEDICATION LIST DOCUMENTED IN MEDICAL RECORD: ICD-10-PCS | Mod: CPTII,S$GLB,, | Performed by: INTERNAL MEDICINE

## 2023-02-20 PROCEDURE — 36415 COLL VENOUS BLD VENIPUNCTURE: CPT | Performed by: PHYSICIAN ASSISTANT

## 2023-02-20 PROCEDURE — 3008F PR BODY MASS INDEX (BMI) DOCUMENTED: ICD-10-PCS | Mod: CPTII,S$GLB,, | Performed by: INTERNAL MEDICINE

## 2023-02-20 PROCEDURE — 4010F ACE/ARB THERAPY RXD/TAKEN: CPT | Mod: CPTII,S$GLB,, | Performed by: INTERNAL MEDICINE

## 2023-02-20 PROCEDURE — 3075F SYST BP GE 130 - 139MM HG: CPT | Mod: CPTII,S$GLB,, | Performed by: INTERNAL MEDICINE

## 2023-02-20 PROCEDURE — 3288F FALL RISK ASSESSMENT DOCD: CPT | Mod: CPTII,S$GLB,, | Performed by: INTERNAL MEDICINE

## 2023-02-20 PROCEDURE — 3072F PR LOW RISK FOR RETINOPATHY: ICD-10-PCS | Mod: CPTII,S$GLB,, | Performed by: INTERNAL MEDICINE

## 2023-02-20 PROCEDURE — 3072F LOW RISK FOR RETINOPATHY: CPT | Mod: CPTII,S$GLB,, | Performed by: INTERNAL MEDICINE

## 2023-02-20 PROCEDURE — 99203 PR OFFICE/OUTPT VISIT, NEW, LEVL III, 30-44 MIN: ICD-10-PCS | Mod: S$GLB,,, | Performed by: INTERNAL MEDICINE

## 2023-02-20 PROCEDURE — 3078F DIAST BP <80 MM HG: CPT | Mod: CPTII,S$GLB,, | Performed by: INTERNAL MEDICINE

## 2023-02-20 PROCEDURE — 1101F PR PT FALLS ASSESS DOC 0-1 FALLS W/OUT INJ PAST YR: ICD-10-PCS | Mod: CPTII,S$GLB,, | Performed by: INTERNAL MEDICINE

## 2023-02-20 PROCEDURE — 3051F PR MOST RECENT HEMOGLOBIN A1C LEVEL 7.0 - < 8.0%: ICD-10-PCS | Mod: CPTII,S$GLB,, | Performed by: INTERNAL MEDICINE

## 2023-02-20 PROCEDURE — 99999 PR PBB SHADOW E&M-EST. PATIENT-LVL V: ICD-10-PCS | Mod: PBBFAC,,, | Performed by: INTERNAL MEDICINE

## 2023-02-20 PROCEDURE — 3066F PR DOCUMENTATION OF TREATMENT FOR NEPHROPATHY: ICD-10-PCS | Mod: CPTII,S$GLB,, | Performed by: INTERNAL MEDICINE

## 2023-02-20 PROCEDURE — 1160F PR REVIEW ALL MEDS BY PRESCRIBER/CLIN PHARMACIST DOCUMENTED: ICD-10-PCS | Mod: CPTII,S$GLB,, | Performed by: INTERNAL MEDICINE

## 2023-02-20 PROCEDURE — 1101F PT FALLS ASSESS-DOCD LE1/YR: CPT | Mod: CPTII,S$GLB,, | Performed by: INTERNAL MEDICINE

## 2023-02-20 PROCEDURE — 1126F AMNT PAIN NOTED NONE PRSNT: CPT | Mod: CPTII,S$GLB,, | Performed by: INTERNAL MEDICINE

## 2023-02-20 PROCEDURE — 3066F NEPHROPATHY DOC TX: CPT | Mod: CPTII,S$GLB,, | Performed by: INTERNAL MEDICINE

## 2023-02-20 PROCEDURE — 3051F HG A1C>EQUAL 7.0%<8.0%: CPT | Mod: CPTII,S$GLB,, | Performed by: INTERNAL MEDICINE

## 2023-02-20 PROCEDURE — 99203 OFFICE O/P NEW LOW 30 MIN: CPT | Mod: S$GLB,,, | Performed by: INTERNAL MEDICINE

## 2023-02-20 PROCEDURE — 3075F PR MOST RECENT SYSTOLIC BLOOD PRESS GE 130-139MM HG: ICD-10-PCS | Mod: CPTII,S$GLB,, | Performed by: INTERNAL MEDICINE

## 2023-02-20 PROCEDURE — 1159F MED LIST DOCD IN RCRD: CPT | Mod: CPTII,S$GLB,, | Performed by: INTERNAL MEDICINE

## 2023-02-20 PROCEDURE — 3078F PR MOST RECENT DIASTOLIC BLOOD PRESSURE < 80 MM HG: ICD-10-PCS | Mod: CPTII,S$GLB,, | Performed by: INTERNAL MEDICINE

## 2023-02-20 PROCEDURE — 3008F BODY MASS INDEX DOCD: CPT | Mod: CPTII,S$GLB,, | Performed by: INTERNAL MEDICINE

## 2023-02-20 RX ORDER — POLYETHYLENE GLYCOL 3350 17 G/17G
17 POWDER, FOR SOLUTION ORAL DAILY
Qty: 595 G | Refills: 6 | Status: SHIPPED | OUTPATIENT
Start: 2023-02-20 | End: 2023-10-16 | Stop reason: SDUPTHER

## 2023-02-20 NOTE — PROGRESS NOTES
[facility]  Gastroenterology    Patient evaluated at the request of Liz Harrell PA-C  [id number] [place] [address]    PCP: Wanda Cook,     2/20/23        Subjective:     Constipation  Patient complains of constipation. Onset was  24  months ago. Patient has been having frequent firm and pellet like stools per week. Defecation has been difficult and incomplete. Co-Morbid conditions:culprit medication: opioid analgesics, endocrine disease, irritable bowel syndrome, metabolic disease, and neurologic disease. Symptoms have progressed to a point and plateaued. Current Health Habits: Eating fiber? no, Exercise? no, Adequate hydration? no. Current over the counter/prescription laxative:  Linzess  which has been somewhat effective. Patient has been evaluated by Dr. John for similar problems and he performed a colonoscopy a year or so ago that was reported as normal.       Past Medical History:   Diagnosis Date    Arthritis     Carotid artery disease     Dr. Jessa Szymanski--cardiology    Depression     DM II (diabetes mellitus, type II), controlled 12 years    Heart disease     Dr. Jessa Szymanski--cardiology    HTN (hypertension)     Hyperlipidemia     Hypothyroidism     Insomnia     Stroke 2010    Dr. Jessa Szymanski--cardiology    Vitamin D deficiency        Past Surgical History:   Procedure Laterality Date    CHOLECYSTECTOMY      COLONOSCOPY      HYSTERECTOMY      THYROIDECTOMY      TOTAL ABDOMINAL HYSTERECTOMY W/ BILATERAL SALPINGOOPHORECTOMY         Current Outpatient Medications on File Prior to Visit   Medication Sig Dispense Refill    aspirin (ECOTRIN) 81 MG EC tablet Take 1 tablet (81 mg total) by mouth once daily. 90 tablet 1    carvediloL (COREG) 25 MG tablet Take 25 mg by mouth 2 (two) times daily.      clopidogreL (PLAVIX) 75 mg tablet Take 75 mg by mouth once daily.      diltiaZEM (DILACOR XR) 240 MG CDCR Take 240 mg by mouth once daily.      dulaglutide  "(TRULICITY) 4.5 mg/0.5 mL pen injector Inject 4.5 mg into the skin every 7 days. 4 pen 6    fluticasone propionate (FLONASE) 50 mcg/actuation nasal spray 2 SPRAYS IN EACH NOSTRIL EVERY DAY AS NEEDED 16 g 1    glipiZIDE (GLUCOTROL) 10 MG tablet TAKE ONE TABLET BY MOUTH TWO TIMES A DAY BEFORE MEALS 60 tablet 3    hydroCHLOROthiazide (HYDRODIURIL) 25 MG tablet Take 1 tablet (25 mg total) by mouth once daily. 30 tablet 5    HYDROcodone-acetaminophen (NORCO)  mg per tablet   0    irbesartan (AVAPRO) 300 MG tablet Take 1 tablet (300 mg total) by mouth every evening. 30 tablet 5    LANTUS SOLOSTAR U-100 INSULIN glargine 100 units/mL SubQ pen INJECT 60 UNITS INTO THE SKIN ONCE DAILY.  UNITS A DAY 30 mL 3    levothyroxine (SYNTHROID) 125 MCG tablet Take 1 tablet (125 mcg total) by mouth before breakfast. 30 tablet 11    memantine (NAMENDA) 10 MG Tab Take 1 tablet (10 mg total) by mouth 2 (two) times daily. 180 tablet 1    metFORMIN (GLUCOPHAGE-XR) 500 MG ER 24hr tablet Take 2 tablets (1,000 mg total) by mouth 2 (two) times daily with meals. 360 tablet 1    ondansetron (ZOFRAN-ODT) 4 MG TbDL Take 4 mg by mouth every 8 (eight) hours as needed.      rosuvastatin (CRESTOR) 10 MG tablet Take 1 tablet (10 mg total) by mouth nightly. 90 tablet 1    sertraline (ZOLOFT) 100 MG tablet Take 1.5 tablets (150 mg total) by mouth once daily. 45 tablet 3    SURE COMFORT PEN NEEDLE 32 gauge x 5/32" Ndle       zolpidem (AMBIEN) 5 MG Tab Take 1 tablet (5 mg total) by mouth nightly as needed (INSOMNIA). 30 tablet 3    [DISCONTINUED] linaCLOtide (LINZESS) 145 mcg Cap capsule Take 1 capsule (145 mcg total) by mouth once daily. 90 capsule 1    blood sugar diagnostic Strp To use 3 times daily 200 strip 10    EASY TOUCH LANCING DEVICE Misc       lancing device with lancets Kit 1 each by Misc.(Non-Drug; Combo Route) route 3 (three) times daily. 200 each 10     No current facility-administered medications on file prior to visit. "       Review of patient's allergies indicates:  No Known Allergies    Social History     Socioeconomic History    Marital status:    Tobacco Use    Smoking status: Never    Smokeless tobacco: Never   Substance and Sexual Activity    Alcohol use: Not Currently       Family History   Problem Relation Age of Onset    Hypertension Mother     Diabetes Sister      Review of Systems   Constitutional:  Negative for chills, fever, malaise/fatigue and weight loss.   HENT:  Negative for ear pain, hearing loss and tinnitus.    Eyes:  Positive for blurred vision. Negative for double vision, photophobia, pain and discharge.   Respiratory:  Negative for cough, hemoptysis, sputum production and wheezing.    Cardiovascular:  Negative for chest pain, palpitations, orthopnea, leg swelling and PND.   Gastrointestinal:  Positive for constipation. Negative for blood in stool.   Genitourinary:  Negative for dysuria, frequency and urgency.   Musculoskeletal:  Negative for myalgias and neck pain.   Skin:  Negative for itching and rash.   Neurological:  Positive for focal weakness. Negative for dizziness and headaches.   Endo/Heme/Allergies:  Negative for environmental allergies. Does not bruise/bleed easily.   Psychiatric/Behavioral:  Negative for depression, hallucinations, substance abuse and suicidal ideas.        Objective:   Vitals:   Vitals:    02/20/23 0938   BP: 130/72   Pulse: 86       Physical Exam  Constitutional:       Appearance: Normal appearance. She is obese.   HENT:      Head: Normocephalic and atraumatic.      Nose: Nose normal.      Mouth/Throat:      Mouth: Mucous membranes are moist.   Eyes:      Extraocular Movements: Extraocular movements intact.      Pupils: Pupils are equal, round, and reactive to light.   Cardiovascular:      Rate and Rhythm: Normal rate and regular rhythm.      Pulses: Normal pulses.   Pulmonary:      Effort: No respiratory distress.      Breath sounds: No stridor. No wheezing, rhonchi or  rales.   Abdominal:      General: Bowel sounds are normal. There is distension.      Palpations: Abdomen is soft. There is no mass.      Tenderness: There is no guarding.      Hernia: No hernia is present.   Musculoskeletal:         General: No deformity.      Cervical back: Normal range of motion and neck supple.      Left lower leg: Edema present.   Skin:     Coloration: Skin is not jaundiced.      Findings: No bruising.   Neurological:      Mental Status: She is alert and oriented to person, place, and time.      Motor: Weakness present.      Gait: Gait abnormal.   Psychiatric:         Mood and Affect: Mood normal.       Lab Results   Component Value Date    WBC 8.69 02/13/2023    HGB 10.9 (L) 02/13/2023    HCT 36.3 (L) 02/13/2023    MCV 87 02/13/2023     02/13/2023       Lab Results   Component Value Date    ALT 18 02/13/2023    AST 15 02/13/2023    ALKPHOS 70 02/13/2023    BILITOT 0.4 02/13/2023     Lab Results   Component Value Date    UIBC 275 05/14/2020    IRON 79 05/14/2020    TIBC 354 05/14/2020    LABIRON 22 05/14/2020      BMP  Lab Results   Component Value Date     02/13/2023    K 4.2 02/13/2023     02/13/2023    CO2 25 02/13/2023    BUN 14 02/13/2023    CREATININE 0.9 02/13/2023    CALCIUM 10.1 02/13/2023    ANIONGAP 10 02/13/2023    EGFRNORACEVR >60.0 02/13/2023       IMPRESSION     Problem List Items Addressed This Visit    None  Visit Diagnoses       Constipation due to opioid therapy    -  Primary    Relevant Medications    linaCLOtide (LINZESS) 290 mcg Cap capsule    polyethylene glycol (GLYCOLAX) 17 gram/dose powder    Dark stools        Narcotic bowel syndrome        Chronic constipation        Relevant Medications    linaCLOtide (LINZESS) 290 mcg Cap capsule    polyethylene glycol (GLYCOLAX) 17 gram/dose powder            PLANS:    Constipation due to opioid therapy  -     Ambulatory referral/consult to Gastroenterology  -     linaCLOtide (LINZESS) 290 mcg Cap capsule; Take 1  capsule (290 mcg total) by mouth once daily.  Dispense: 30 capsule; Refill: 11  -     polyethylene glycol (GLYCOLAX) 17 gram/dose powder; Take 17 g by mouth once daily.  Dispense: 595 g; Refill: 6    Dark stools  -     Ambulatory referral/consult to Gastroenterology    Narcotic bowel syndrome    Chronic constipation  -     linaCLOtide (LINZESS) 290 mcg Cap capsule; Take 1 capsule (290 mcg total) by mouth once daily.  Dispense: 30 capsule; Refill: 11    A high fiber diet with plenty of fluids (up to 8 glasses of water daily) is suggested to relieve these symptoms.  Metamucil, 1 tablespoon once or twice daily can be used to keep bowels regular if needed.  Increase water intake.   Chronic anemia is not new. There is no strong evidence of black stools. She turned in a stool sample today for occult blood.     Thanks for letting us participate in the care of your patient.    Jordan Marinelli M.D.

## 2023-02-20 NOTE — PATIENT INSTRUCTIONS
Constipation due to opioid therapy  -     Ambulatory referral/consult to Gastroenterology  -     linaCLOtide (LINZESS) 290 mcg Cap capsule; Take 1 capsule (290 mcg total) by mouth once daily.  Dispense: 30 capsule; Refill: 11  -     polyethylene glycol (GLYCOLAX) 17 gram/dose powder; Take 17 g by mouth once daily.  Dispense: 595 g; Refill: 6    Dark stools  -     Ambulatory referral/consult to Gastroenterology    Narcotic bowel syndrome    Chronic constipation  -     linaCLOtide (LINZESS) 290 mcg Cap capsule; Take 1 capsule (290 mcg total) by mouth once daily.  Dispense: 30 capsule; Refill: 11      Thanks for trusting us with your healthcare needs and using [facility]. If you want to ask us a question, you can do so by replying to this message or by calling [phone].    Sincerely,    Jordan Marinelli M.D.      To rate your experience with Dr. Marinelli please click on the link below:    [email or web address]

## 2023-03-06 NOTE — PROGRESS NOTES
Established Patient - Virtual Telehealth Visit     The patient location is: Home ([place])  The chief complaint leading to consultation is: Diabetes Follow-up  Visit type: Virtual visit with synchronous audio and video via Epic Haiku jessie  Total time spent with patient: 15 minutes     Each patient to whom I provide medical services by telemedicine is:  (1) informed of the relationship between the physician and patient and the respective role of any other health care provider with respect to management of the patient; and (2) notified that they may decline to receive medical services by telemedicine and may withdraw from such care at any time. Patient verbally consented to receive this service via voice-only telephone call.    PCP: Wanda Cook DO    Subjective:     Chief Complaint: Diabetes follow up.  Last visit (virtual) with me: 12/21/2022    HPI: 67 y.o.  female for diabetes follow up.   Type II diabetes for > 10 years.  Comorbidities: HTN, HLD, CAD, S/p CVA (2010), Left Hemiparesis, Hypothyroidism and Overweight by BMI   Daughter with patient during visit.    Family History of Type 2 DM: father  Known diabetes complications:  DKA/HHS: no  Retinopathy: no  Peripheral neuropathy: no  Nephropathy: no    Interval history:  Daughter with patient during visit.  Denies recent hospital admissions or ER visits.   Admits having hypoglycemia, fasting BG reading at 55.  Missed light dinner.  Endorses diabetes related symptoms: None.    Blood glucose monitoring via fingerstick: 2-3 x/day   Per patient's review of BG log, over the last 2 weeks   Fasting BG range 140-150   AC lunch range 150 - 228 average high 100's low 200's  AC dinner range 140-203 average 140's    Activity level: Sedentary  Meal Planning:3 meals/day;1 snacks/day.    Social history:    - Single, Lives with daughter, grandchild    Medication compliance all of the time, diet compliance most of the time.   To be enrolled in diabetes  education classes.     Previous regimen: Humalog 75/25    Past failed treatment: N/A    Current DM Medications:  Diabetes Medications               dulaglutide (TRULICITY) 4.5 mg/0.5 mL pen injector Inject 4.5 mg into the skin every 7 days.    glipiZIDE (GLUCOTROL) 10 MG tablet TAKE ONE TABLET BY MOUTH TWO TIMES A DAY BEFORE MEALS    LANTUS SOLOSTAR U-100 INSULIN glargine 100 units/mL SubQ pen INJECT 60 UNITS INTO THE SKIN ONCE DAILY.  UNITS A DAY    metFORMIN (GLUCOPHAGE-XR) 500 MG ER 24hr tablet Take 2 tablets (1,000 mg total) by mouth 2 (two) times daily with meals.     Allergies  Review of patient's allergies indicates:  No Known Allergies    Labs Reviewed:  Her most recent A1C is:  Lab Results   Component Value Date    HGBA1C 7.0 (H) 02/13/2023    HGBA1C 7.7 (H) 12/19/2022    HGBA1C 7.9 (H) 07/18/2022       Her most recent BMP is:  Lab Results   Component Value Date     02/13/2023    K 4.2 02/13/2023     02/13/2023    CO2 25 02/13/2023    BUN 14 02/13/2023    CREATININE 0.9 02/13/2023    CALCIUM 10.1 02/13/2023    ANIONGAP 10 02/13/2023    ESTGFRAFRICA >60.0 07/18/2022    EGFRNONAA 58.0 (A) 07/18/2022       No results found for: CPEPTIDE  No results found for: GLUTAMICACID    There is no height or weight on file to calculate BMI.    Wt Readings from Last 3 Encounters:   02/20/23 0938 93 kg (205 lb 0.4 oz)   02/13/23 0947 95.9 kg (211 lb 6.7 oz)   11/16/22 0809 95.9 kg (211 lb 6.7 oz)        Her blood sugar in clinic today is: Not assessed due to type of visit.    Diabetes Management Status  Statin: Taking  ACE/ARB: Taking    Screening or Prevention Patient's value Goal Complete/Controlled?   HgA1C Testing and Control   Lab Results   Component Value Date    HGBA1C 7.0 (H) 02/13/2023      Annually/Less than 8% Yes     Lipid profile : 02/13/2023 Annually Yes     LDL control Lab Results   Component Value Date    LDLCALC 46.0 (L) 02/13/2023    Annually/Less than 100 mg/dl  Yes     Nephropathy  screening Lab Results   Component Value Date    MICALBCREAT Unable to calculate 02/13/2023     No results found for: PROTEINUA Annually Yes     Blood pressure BP Readings from Last 1 Encounters:   02/20/23 130/72    Less than 140/90 Yes     Dilated retinal exam : 06/30/2022 Annually Yes    Foot exam   : 05/05/2022 Annually Yes       Social History     Socioeconomic History    Marital status:    Tobacco Use    Smoking status: Never    Smokeless tobacco: Never   Substance and Sexual Activity    Alcohol use: Not Currently     Past Medical History:   Diagnosis Date    Arthritis     Carotid artery disease     Dr. Jessa Szymanski--cardiology    Depression     DM II (diabetes mellitus, type II), controlled 12 years    Heart disease     Dr. Jessa Szymanski--cardiology    HTN (hypertension)     Hyperlipidemia     Hypothyroidism     Insomnia     Stroke 2010    Dr. Jessa Szymanski--cardiology    Vitamin D deficiency      eview of Systems   Constitutional: Negative for activity change, appetite change, fatigue and unexpected weight change.   HENT: Negative for dental problem and trouble swallowing.    Eyes: Negative for visual disturbance.   Respiratory: Negative for chest tightness and shortness of breath.    Cardiovascular: Negative for chest pain, palpitations and leg swelling.   Gastrointestinal: Negative for abdominal pain, constipation, diarrhea, nausea and vomiting.   Endocrine: Negative for polydipsia, polyphagia and polyuria.   Genitourinary: Negative for difficulty urinating, dysuria, frequency and urgency.        Negative yeast infection   Musculoskeletal: Positive for gait problem (r/t left sided weakness, ambulates with quad cane). Negative for myalgias and neck pain.   Integumentary:  Negative for rash and wound.   Allergic/Immunologic: Negative.    Neurological: Positive for weakness (residual left sided secondary to CVA). Negative for dizziness, syncope, numbness and headaches.   Hematological: Negative.     Psychiatric/Behavioral: Negative for confusion and sleep disturbance.   All other systems reviewed and are negative.     Objective:      Physical Exam  Constitutional:       General: Awake.      Appearance: Normal appearance. Well-developed and well-groomed.   HENT:      Head: Normocephalic and atraumatic.   Eyes:      General: Lids are normal. Gaze aligned appropriately.   Pulmonary:      Effort: Pulmonary effort is normal. No respiratory distress.   Neurological:      Mental Status: Alert and oriented to person, place, and time.   Psychiatric:         Attention and Perception: Attention normal.         Mood and Affect: Mood and affect normal.         Speech: Speech normal.         Behavior: Behavior normal.         Thought Content: Thought content normal.         Cognition and Memory: Cognition normal.         Judgment: Judgment normal.      Assessment / Plan:     Diagnoses and all orders for this visit:    Type II diabetes mellitus with peripheral circulatory disorder  -     LANTUS SOLOSTAR U-100 INSULIN glargine 100 units/mL SubQ pen; Inject 66 Units into the skin every morning.  -     Hemoglobin A1C; Future    Hypertension, unspecified type    Hyperlipidemia, unspecified hyperlipidemia type    Additional Plan Details:  - Condition: Sub optimal control, most recent A1C 7.0 % was completed 1 month ago. Improved from previous reading.  - Monitor blood glucose:  4x daily.Goals reviewed. Maintain BG log. Bring to next visit for review.  - Hypoglycemia protocol: Reviewed and risk discussed.  Notify if BG readings below 80. Protocol printout provided.   - Diet: Limit carbohydrate intake 30-45 grams/meal, 3x daily and 15 grams/snack, limit 2/day.  - Activity: Recommend at least 150 minutes of exercise in a week.  - BP and LDL: Recommend lifestyle modifications and follow up with PCP for management.   - Medication Regimen:     Continue Trulicity 4.5 mg weekly  Continue Glipizide 10 tablet, 1 tablet before breakfast  and 1 tablet before dinner, may do half tablet if eating lighter meals. Discussed timing of administration.  Increase Lantus 66 units in the morning  Continue Metformin 500 mg, 2 tablets twice daily with meals    - Medication consideration: Titrate Trulicity/basal to goal BG. May also benefit from SGLT 2 inhibitor.  - Lab results: A1C discussed.  - Health Maintenance standards addressed today:  A1c scheduled.   - Risks of uncontrolled DM explained. Importance of routine foot and eye exams reviewed. Scheduled as appropriate.  -The patient was explained the above plan and given opportunity to ask questions.  She understands, chooses and consents to this plan and accepts all the risks, which include but are not limited to the risks mentioned above.   - Labs ordered as above.  - CDE referral: Scheduled  - Follow up: 3 months virtual.        Charlotte T. Delacruz, FNP-C [facility]    A total of 15 minutes was spent in face to face time, of which over 50 % was spent in counseling patient on disease process, complications, treatment, and side effects of medications.

## 2023-03-13 ENCOUNTER — OFFICE VISIT (OUTPATIENT)
Dept: DIABETES | Facility: CLINIC | Age: 68
End: 2023-03-13
Payer: MEDICARE

## 2023-03-13 ENCOUNTER — TELEPHONE (OUTPATIENT)
Dept: INTERNAL MEDICINE | Facility: CLINIC | Age: 68
End: 2023-03-13
Payer: MEDICARE

## 2023-03-13 DIAGNOSIS — E11.51 TYPE II DIABETES MELLITUS WITH PERIPHERAL CIRCULATORY DISORDER: Primary | ICD-10-CM

## 2023-03-13 DIAGNOSIS — E78.5 HYPERLIPIDEMIA, UNSPECIFIED HYPERLIPIDEMIA TYPE: ICD-10-CM

## 2023-03-13 DIAGNOSIS — I10 HYPERTENSION, UNSPECIFIED TYPE: ICD-10-CM

## 2023-03-13 PROCEDURE — 1160F RVW MEDS BY RX/DR IN RCRD: CPT | Mod: CPTII,95,, | Performed by: NURSE PRACTITIONER

## 2023-03-13 PROCEDURE — 1159F PR MEDICATION LIST DOCUMENTED IN MEDICAL RECORD: ICD-10-PCS | Mod: CPTII,95,, | Performed by: NURSE PRACTITIONER

## 2023-03-13 PROCEDURE — 3066F NEPHROPATHY DOC TX: CPT | Mod: CPTII,95,, | Performed by: NURSE PRACTITIONER

## 2023-03-13 PROCEDURE — 99214 OFFICE O/P EST MOD 30 MIN: CPT | Mod: 95,,, | Performed by: NURSE PRACTITIONER

## 2023-03-13 PROCEDURE — 3051F HG A1C>EQUAL 7.0%<8.0%: CPT | Mod: CPTII,95,, | Performed by: NURSE PRACTITIONER

## 2023-03-13 PROCEDURE — 4010F ACE/ARB THERAPY RXD/TAKEN: CPT | Mod: CPTII,95,, | Performed by: NURSE PRACTITIONER

## 2023-03-13 PROCEDURE — 4010F PR ACE/ARB THEARPY RXD/TAKEN: ICD-10-PCS | Mod: CPTII,95,, | Performed by: NURSE PRACTITIONER

## 2023-03-13 PROCEDURE — 3072F PR LOW RISK FOR RETINOPATHY: ICD-10-PCS | Mod: CPTII,95,, | Performed by: NURSE PRACTITIONER

## 2023-03-13 PROCEDURE — 1160F PR REVIEW ALL MEDS BY PRESCRIBER/CLIN PHARMACIST DOCUMENTED: ICD-10-PCS | Mod: CPTII,95,, | Performed by: NURSE PRACTITIONER

## 2023-03-13 PROCEDURE — 1159F MED LIST DOCD IN RCRD: CPT | Mod: CPTII,95,, | Performed by: NURSE PRACTITIONER

## 2023-03-13 PROCEDURE — 3066F PR DOCUMENTATION OF TREATMENT FOR NEPHROPATHY: ICD-10-PCS | Mod: CPTII,95,, | Performed by: NURSE PRACTITIONER

## 2023-03-13 PROCEDURE — 3072F LOW RISK FOR RETINOPATHY: CPT | Mod: CPTII,95,, | Performed by: NURSE PRACTITIONER

## 2023-03-13 PROCEDURE — 3061F PR NEG MICROALBUMINURIA RESULT DOCUMENTED/REVIEW: ICD-10-PCS | Mod: CPTII,95,, | Performed by: NURSE PRACTITIONER

## 2023-03-13 PROCEDURE — 99214 PR OFFICE/OUTPT VISIT, EST, LEVL IV, 30-39 MIN: ICD-10-PCS | Mod: 95,,, | Performed by: NURSE PRACTITIONER

## 2023-03-13 PROCEDURE — 3051F PR MOST RECENT HEMOGLOBIN A1C LEVEL 7.0 - < 8.0%: ICD-10-PCS | Mod: CPTII,95,, | Performed by: NURSE PRACTITIONER

## 2023-03-13 PROCEDURE — 3061F NEG MICROALBUMINURIA REV: CPT | Mod: CPTII,95,, | Performed by: NURSE PRACTITIONER

## 2023-03-13 RX ORDER — INSULIN GLARGINE 100 [IU]/ML
66 INJECTION, SOLUTION SUBCUTANEOUS EVERY MORNING
Qty: 30 ML | Refills: 5
Start: 2023-03-13 | End: 2023-06-12

## 2023-03-13 NOTE — TELEPHONE ENCOUNTER
----- Message from Lindsey Cohen sent at 3/13/2023  2:39 PM CDT -----  Daughter is requesting a call back in regards to results,please call [phone]

## 2023-03-13 NOTE — TELEPHONE ENCOUNTER
----- Message from Lindsey Cohen sent at 3/13/2023  2:39 PM CDT -----  Daughter is requesting a call back in regards to results,please call [phone]

## 2023-03-13 NOTE — PATIENT INSTRUCTIONS
Medication Regimen:   Continue Trulicity 4.5 mg weekly  Continue Glipizide 10 tablet, 1 tablet before breakfast and 1 tablet before dinner, may do half tablet if eating lighter meals  Increase Lantus 66 units in the morning  Continue Metformin 500 mg, 2 tablets twice daily with meals    Patient Instructions:  Carbohydrate Count: 30-45 grams/meal and 15 grams/snack with limit of 2 snacks per day.  Exercise: Goal is 150 minutes or more per week.  Bring meter and blood sugar log to each appointment.     Goals for Blood Sugar:  Fastin-130 mg/dl  2 hours after meal:  mg/dl  Before Bed: 100-150 mg/dl  If Blood sugar is below 70 mg/dl, DO NOT take diabetes medication. Eat first and then recheck blood sugar in 20 minutes.  Foods to eat if blood sugar is below 70 mg/dl  1/2 glass of orange juice   1/2 regular cola can   3-4 hard candies   3-4 small glucose tabs.   Foods to eat if blood sugar is below 50 mg/dl  1 glass of orange juice  1 regular cola can  8 hard candies  8 small glucose tabs.   If you have repeated eating/blood sugar recheck process 2 times and blood sugar still below 70 mg/dl, call 911.

## 2023-03-13 NOTE — TELEPHONE ENCOUNTER
Daughter called requesting patient's results.  Verified patient first&last name and   Results given.

## 2023-03-22 ENCOUNTER — OFFICE VISIT (OUTPATIENT)
Dept: INTERNAL MEDICINE | Facility: CLINIC | Age: 68
End: 2023-03-22
Payer: MEDICARE

## 2023-03-22 VITALS
WEIGHT: 206.13 LBS | DIASTOLIC BLOOD PRESSURE: 80 MMHG | SYSTOLIC BLOOD PRESSURE: 130 MMHG | HEIGHT: 63 IN | BODY MASS INDEX: 36.52 KG/M2 | TEMPERATURE: 96 F | RESPIRATION RATE: 20 BRPM | HEART RATE: 90 BPM | OXYGEN SATURATION: 98 %

## 2023-03-22 DIAGNOSIS — I69.354 HEMIPARESIS AFFECTING LEFT SIDE AS LATE EFFECT OF CEREBROVASCULAR ACCIDENT: ICD-10-CM

## 2023-03-22 DIAGNOSIS — I10 HYPERTENSION GOAL BP (BLOOD PRESSURE) < 130/80: Primary | ICD-10-CM

## 2023-03-22 DIAGNOSIS — E11.65 UNCONTROLLED TYPE 2 DIABETES MELLITUS WITH HYPERGLYCEMIA, WITH LONG-TERM CURRENT USE OF INSULIN: ICD-10-CM

## 2023-03-22 DIAGNOSIS — E66.01 SEVERE OBESITY (BMI 35.0-39.9) WITH COMORBIDITY: ICD-10-CM

## 2023-03-22 DIAGNOSIS — Z23 IMMUNIZATION DUE: ICD-10-CM

## 2023-03-22 DIAGNOSIS — Z79.4 UNCONTROLLED TYPE 2 DIABETES MELLITUS WITH HYPERGLYCEMIA, WITH LONG-TERM CURRENT USE OF INSULIN: ICD-10-CM

## 2023-03-22 PROCEDURE — 3075F SYST BP GE 130 - 139MM HG: CPT | Mod: CPTII,S$GLB,, | Performed by: INTERNAL MEDICINE

## 2023-03-22 PROCEDURE — 3061F PR NEG MICROALBUMINURIA RESULT DOCUMENTED/REVIEW: ICD-10-PCS | Mod: CPTII,S$GLB,, | Performed by: INTERNAL MEDICINE

## 2023-03-22 PROCEDURE — 90677 PNEUMOCOCCAL CONJUGATE VACCINE 20-VALENT: ICD-10-PCS | Mod: S$GLB,,, | Performed by: INTERNAL MEDICINE

## 2023-03-22 PROCEDURE — 3066F NEPHROPATHY DOC TX: CPT | Mod: CPTII,S$GLB,, | Performed by: INTERNAL MEDICINE

## 2023-03-22 PROCEDURE — 3008F PR BODY MASS INDEX (BMI) DOCUMENTED: ICD-10-PCS | Mod: CPTII,S$GLB,, | Performed by: INTERNAL MEDICINE

## 2023-03-22 PROCEDURE — 3051F PR MOST RECENT HEMOGLOBIN A1C LEVEL 7.0 - < 8.0%: ICD-10-PCS | Mod: CPTII,S$GLB,, | Performed by: INTERNAL MEDICINE

## 2023-03-22 PROCEDURE — 3061F NEG MICROALBUMINURIA REV: CPT | Mod: CPTII,S$GLB,, | Performed by: INTERNAL MEDICINE

## 2023-03-22 PROCEDURE — 3079F DIAST BP 80-89 MM HG: CPT | Mod: CPTII,S$GLB,, | Performed by: INTERNAL MEDICINE

## 2023-03-22 PROCEDURE — 99999 PR PBB SHADOW E&M-EST. PATIENT-LVL V: ICD-10-PCS | Mod: PBBFAC,,, | Performed by: INTERNAL MEDICINE

## 2023-03-22 PROCEDURE — 3066F PR DOCUMENTATION OF TREATMENT FOR NEPHROPATHY: ICD-10-PCS | Mod: CPTII,S$GLB,, | Performed by: INTERNAL MEDICINE

## 2023-03-22 PROCEDURE — 1159F MED LIST DOCD IN RCRD: CPT | Mod: CPTII,S$GLB,, | Performed by: INTERNAL MEDICINE

## 2023-03-22 PROCEDURE — 3072F PR LOW RISK FOR RETINOPATHY: ICD-10-PCS | Mod: CPTII,S$GLB,, | Performed by: INTERNAL MEDICINE

## 2023-03-22 PROCEDURE — 3079F PR MOST RECENT DIASTOLIC BLOOD PRESSURE 80-89 MM HG: ICD-10-PCS | Mod: CPTII,S$GLB,, | Performed by: INTERNAL MEDICINE

## 2023-03-22 PROCEDURE — 99214 OFFICE O/P EST MOD 30 MIN: CPT | Mod: S$GLB,,, | Performed by: INTERNAL MEDICINE

## 2023-03-22 PROCEDURE — 3288F FALL RISK ASSESSMENT DOCD: CPT | Mod: CPTII,S$GLB,, | Performed by: INTERNAL MEDICINE

## 2023-03-22 PROCEDURE — 1159F PR MEDICATION LIST DOCUMENTED IN MEDICAL RECORD: ICD-10-PCS | Mod: CPTII,S$GLB,, | Performed by: INTERNAL MEDICINE

## 2023-03-22 PROCEDURE — 3008F BODY MASS INDEX DOCD: CPT | Mod: CPTII,S$GLB,, | Performed by: INTERNAL MEDICINE

## 2023-03-22 PROCEDURE — G0009 ADMIN PNEUMOCOCCAL VACCINE: HCPCS | Mod: S$GLB,,, | Performed by: INTERNAL MEDICINE

## 2023-03-22 PROCEDURE — 1101F PR PT FALLS ASSESS DOC 0-1 FALLS W/OUT INJ PAST YR: ICD-10-PCS | Mod: CPTII,S$GLB,, | Performed by: INTERNAL MEDICINE

## 2023-03-22 PROCEDURE — 4010F PR ACE/ARB THEARPY RXD/TAKEN: ICD-10-PCS | Mod: CPTII,S$GLB,, | Performed by: INTERNAL MEDICINE

## 2023-03-22 PROCEDURE — 99999 PR PBB SHADOW E&M-EST. PATIENT-LVL V: CPT | Mod: PBBFAC,,, | Performed by: INTERNAL MEDICINE

## 2023-03-22 PROCEDURE — 1160F RVW MEDS BY RX/DR IN RCRD: CPT | Mod: CPTII,S$GLB,, | Performed by: INTERNAL MEDICINE

## 2023-03-22 PROCEDURE — 1101F PT FALLS ASSESS-DOCD LE1/YR: CPT | Mod: CPTII,S$GLB,, | Performed by: INTERNAL MEDICINE

## 2023-03-22 PROCEDURE — 3072F LOW RISK FOR RETINOPATHY: CPT | Mod: CPTII,S$GLB,, | Performed by: INTERNAL MEDICINE

## 2023-03-22 PROCEDURE — G0009 PNEUMOCOCCAL CONJUGATE VACCINE 20-VALENT: ICD-10-PCS | Mod: S$GLB,,, | Performed by: INTERNAL MEDICINE

## 2023-03-22 PROCEDURE — 1160F PR REVIEW ALL MEDS BY PRESCRIBER/CLIN PHARMACIST DOCUMENTED: ICD-10-PCS | Mod: CPTII,S$GLB,, | Performed by: INTERNAL MEDICINE

## 2023-03-22 PROCEDURE — 3075F PR MOST RECENT SYSTOLIC BLOOD PRESS GE 130-139MM HG: ICD-10-PCS | Mod: CPTII,S$GLB,, | Performed by: INTERNAL MEDICINE

## 2023-03-22 PROCEDURE — 1126F PR PAIN SEVERITY QUANTIFIED, NO PAIN PRESENT: ICD-10-PCS | Mod: CPTII,S$GLB,, | Performed by: INTERNAL MEDICINE

## 2023-03-22 PROCEDURE — 1126F AMNT PAIN NOTED NONE PRSNT: CPT | Mod: CPTII,S$GLB,, | Performed by: INTERNAL MEDICINE

## 2023-03-22 PROCEDURE — 3288F PR FALLS RISK ASSESSMENT DOCUMENTED: ICD-10-PCS | Mod: CPTII,S$GLB,, | Performed by: INTERNAL MEDICINE

## 2023-03-22 PROCEDURE — 90677 PCV20 VACCINE IM: CPT | Mod: S$GLB,,, | Performed by: INTERNAL MEDICINE

## 2023-03-22 PROCEDURE — 99214 PR OFFICE/OUTPT VISIT, EST, LEVL IV, 30-39 MIN: ICD-10-PCS | Mod: S$GLB,,, | Performed by: INTERNAL MEDICINE

## 2023-03-22 PROCEDURE — 4010F ACE/ARB THERAPY RXD/TAKEN: CPT | Mod: CPTII,S$GLB,, | Performed by: INTERNAL MEDICINE

## 2023-03-22 PROCEDURE — 3051F HG A1C>EQUAL 7.0%<8.0%: CPT | Mod: CPTII,S$GLB,, | Performed by: INTERNAL MEDICINE

## 2023-03-22 NOTE — PROGRESS NOTES
Krysta Manuel  67 y.o. Black or  female  [id number]    Chief Complaint:  Chief Complaint   Patient presents with    Follow-up       History of Present Illness:  HTN--stable. Compliant with medication. Checks b/p regularly at home.   DM--improved. Compliant with medication.  CVA--no new symptoms. Compliant with rosuvastatin.     Laboratory:  Lab Results   Component Value Date    WBC 8.69 02/13/2023    HGB 10.9 (L) 02/13/2023    HCT 36.3 (L) 02/13/2023     02/13/2023    CHOL 94 (L) 02/13/2023    TRIG 75 02/13/2023    HDL 33 (L) 02/13/2023    ALT 18 02/13/2023    AST 15 02/13/2023     02/13/2023    K 4.2 02/13/2023     02/13/2023    CREATININE 0.9 02/13/2023    BUN 14 02/13/2023    CO2 25 02/13/2023    TSH 2.804 07/18/2022    HGBA1C 7.0 (H) 02/13/2023       History:  Past Medical History:   Diagnosis Date    Arthritis     Carotid artery disease     Dr. Jessa Szymanski--cardiology    Depression     DM II (diabetes mellitus, type II), controlled 12 years    Heart disease     Dr. Jessa Szymanski--cardiology    HTN (hypertension)     Hyperlipidemia     Hypothyroidism     Insomnia     Stroke 2010    Dr. Jessa Szymanski--cardiology    Vitamin D deficiency        Medications:  Current Outpatient Medications on File Prior to Visit   Medication Sig Dispense Refill    aspirin (ECOTRIN) 81 MG EC tablet Take 1 tablet (81 mg total) by mouth once daily. 90 tablet 1    blood sugar diagnostic Strp To use 3 times daily 200 strip 10    carvediloL (COREG) 25 MG tablet Take 25 mg by mouth 2 (two) times daily.      clopidogreL (PLAVIX) 75 mg tablet Take 75 mg by mouth once daily.      diltiaZEM (DILACOR XR) 240 MG CDCR Take 240 mg by mouth once daily.      dulaglutide (TRULICITY) 4.5 mg/0.5 mL pen injector Inject 4.5 mg into the skin every 7 days. 4 pen 6    EASY TOUCH LANCING DEVICE Misc       fluticasone propionate (FLONASE) 50 mcg/actuation nasal spray 2 SPRAYS IN EACH NOSTRIL EVERY DAY AS NEEDED 16 g 1     "glipiZIDE (GLUCOTROL) 10 MG tablet TAKE ONE TABLET BY MOUTH TWO TIMES A DAY BEFORE MEALS 60 tablet 5    hydroCHLOROthiazide (HYDRODIURIL) 25 MG tablet Take 1 tablet (25 mg total) by mouth once daily. 30 tablet 5    HYDROcodone-acetaminophen (NORCO)  mg per tablet   0    irbesartan (AVAPRO) 300 MG tablet Take 1 tablet (300 mg total) by mouth every evening. 30 tablet 5    lancing device with lancets Kit 1 each by Misc.(Non-Drug; Combo Route) route 3 (three) times daily. 200 each 10    LANTUS SOLOSTAR U-100 INSULIN glargine 100 units/mL SubQ pen Inject 66 Units into the skin every morning. 30 mL 5    levothyroxine (SYNTHROID) 125 MCG tablet Take 1 tablet (125 mcg total) by mouth before breakfast. 30 tablet 11    linaCLOtide (LINZESS) 290 mcg Cap capsule Take 1 capsule (290 mcg total) by mouth once daily. 30 capsule 11    memantine (NAMENDA) 10 MG Tab Take 1 tablet (10 mg total) by mouth 2 (two) times daily. 180 tablet 1    metFORMIN (GLUCOPHAGE-XR) 500 MG ER 24hr tablet Take 2 tablets (1,000 mg total) by mouth 2 (two) times daily with meals. 360 tablet 1    ondansetron (ZOFRAN-ODT) 4 MG TbDL Take 4 mg by mouth every 8 (eight) hours as needed.      polyethylene glycol (GLYCOLAX) 17 gram/dose powder Take 17 g by mouth once daily. 595 g 6    rosuvastatin (CRESTOR) 10 MG tablet Take 1 tablet (10 mg total) by mouth nightly. 90 tablet 1    sertraline (ZOLOFT) 100 MG tablet Take 1.5 tablets (150 mg total) by mouth once daily. 45 tablet 3    SURE COMFORT PEN NEEDLE 32 gauge x 5/32" Ndle       zolpidem (AMBIEN) 5 MG Tab Take 1 tablet (5 mg total) by mouth nightly as needed (INSOMNIA). 30 tablet 3     No current facility-administered medications on file prior to visit.       Allergies:  Review of patient's allergies indicates:  No Known Allergies    Review of Systems   Constitutional:  Negative for fever.   Respiratory:  Negative for shortness of breath.    Cardiovascular:  Negative for chest pain.   Neurological:  " Negative for dizziness and headaches.     Exam:  Vitals:    03/22/23 0952   BP: 130/80   Pulse:    Resp:    Temp:      Weight: 93.5 kg (206 lb 2.1 oz)   Body mass index is 36.51 kg/m².      Physical Exam  Vitals reviewed.   Constitutional:       General: She is not in acute distress.     Appearance: She is well-developed. She is not ill-appearing.   Eyes:      General: No scleral icterus.  Cardiovascular:      Rate and Rhythm: Normal rate and regular rhythm.      Heart sounds: Normal heart sounds.   Pulmonary:      Effort: Pulmonary effort is normal. No respiratory distress.      Breath sounds: Normal breath sounds.   Skin:     General: Skin is warm and dry.   Neurological:      Mental Status: She is alert and oriented to person, place, and time.   Psychiatric:         Behavior: Behavior normal.       Assessment:  The primary encounter diagnosis was Hypertension goal BP (blood pressure) < 130/80. Diagnoses of Uncontrolled type 2 diabetes mellitus with hyperglycemia, with long-term current use of insulin, Hemiparesis affecting left side as late effect of cerebrovascular accident, Severe obesity (BMI 35.0-39.9) with comorbidity, and Immunization due were also pertinent to this visit.    Plan:  Hypertension goal BP (blood pressure) < 130/80  -     continue current management  -     continue home b/p monitoring    Uncontrolled type 2 diabetes mellitus with hyperglycemia, with long-term current use of insulin  -     improved   -     continue glipizide, metformin, Trulicity and Lantus  -       Hemiparesis affecting left side as late effect of cerebrovascular accident  -     continue aspirin and rosuvastatin    Severe obesity (BMI 35.0-39.9) with comorbidity  -     Lifestyle modifications discussed    Immunization due  -     (In Office Administered) Pneumococcal Conjugate Vaccine (20 Valent) (IM)        Follow up in about 6 months (around 9/22/2023).

## 2023-03-27 ENCOUNTER — OFFICE VISIT (OUTPATIENT)
Dept: OPHTHALMOLOGY | Facility: CLINIC | Age: 68
End: 2023-03-27
Payer: MEDICARE

## 2023-03-27 DIAGNOSIS — E11.9 DIABETES MELLITUS TYPE 2 WITHOUT RETINOPATHY: Primary | ICD-10-CM

## 2023-03-27 DIAGNOSIS — H52.7 REFRACTIVE ERRORS: ICD-10-CM

## 2023-03-27 DIAGNOSIS — E11.36 DIABETIC CATARACT: ICD-10-CM

## 2023-03-27 PROCEDURE — 3061F PR NEG MICROALBUMINURIA RESULT DOCUMENTED/REVIEW: ICD-10-PCS | Mod: CPTII,S$GLB,, | Performed by: OPTOMETRIST

## 2023-03-27 PROCEDURE — 3066F PR DOCUMENTATION OF TREATMENT FOR NEPHROPATHY: ICD-10-PCS | Mod: CPTII,S$GLB,, | Performed by: OPTOMETRIST

## 2023-03-27 PROCEDURE — 1159F PR MEDICATION LIST DOCUMENTED IN MEDICAL RECORD: ICD-10-PCS | Mod: CPTII,S$GLB,, | Performed by: OPTOMETRIST

## 2023-03-27 PROCEDURE — 4010F ACE/ARB THERAPY RXD/TAKEN: CPT | Mod: CPTII,S$GLB,, | Performed by: OPTOMETRIST

## 2023-03-27 PROCEDURE — 2022F PR DILATED RETINAL EYE EXAM WITH INTERP/REVIEW: ICD-10-PCS | Mod: CPTII,S$GLB,, | Performed by: OPTOMETRIST

## 2023-03-27 PROCEDURE — 3051F HG A1C>EQUAL 7.0%<8.0%: CPT | Mod: CPTII,S$GLB,, | Performed by: OPTOMETRIST

## 2023-03-27 PROCEDURE — 99999 PR PBB SHADOW E&M-EST. PATIENT-LVL I: ICD-10-PCS | Mod: PBBFAC,,, | Performed by: OPTOMETRIST

## 2023-03-27 PROCEDURE — 92015 PR REFRACTION: ICD-10-PCS | Mod: S$GLB,,, | Performed by: OPTOMETRIST

## 2023-03-27 PROCEDURE — 1159F MED LIST DOCD IN RCRD: CPT | Mod: CPTII,S$GLB,, | Performed by: OPTOMETRIST

## 2023-03-27 PROCEDURE — 3051F PR MOST RECENT HEMOGLOBIN A1C LEVEL 7.0 - < 8.0%: ICD-10-PCS | Mod: CPTII,S$GLB,, | Performed by: OPTOMETRIST

## 2023-03-27 PROCEDURE — 92015 DETERMINE REFRACTIVE STATE: CPT | Mod: S$GLB,,, | Performed by: OPTOMETRIST

## 2023-03-27 PROCEDURE — 3066F NEPHROPATHY DOC TX: CPT | Mod: CPTII,S$GLB,, | Performed by: OPTOMETRIST

## 2023-03-27 PROCEDURE — 3061F NEG MICROALBUMINURIA REV: CPT | Mod: CPTII,S$GLB,, | Performed by: OPTOMETRIST

## 2023-03-27 PROCEDURE — 92014 COMPRE OPH EXAM EST PT 1/>: CPT | Mod: S$GLB,,, | Performed by: OPTOMETRIST

## 2023-03-27 PROCEDURE — 4010F PR ACE/ARB THEARPY RXD/TAKEN: ICD-10-PCS | Mod: CPTII,S$GLB,, | Performed by: OPTOMETRIST

## 2023-03-27 PROCEDURE — 92014 PR EYE EXAM, EST PATIENT,COMPREHESV: ICD-10-PCS | Mod: S$GLB,,, | Performed by: OPTOMETRIST

## 2023-03-27 PROCEDURE — 2022F DILAT RTA XM EVC RTNOPTHY: CPT | Mod: CPTII,S$GLB,, | Performed by: OPTOMETRIST

## 2023-03-27 PROCEDURE — 99999 PR PBB SHADOW E&M-EST. PATIENT-LVL I: CPT | Mod: PBBFAC,,, | Performed by: OPTOMETRIST

## 2023-03-27 NOTE — PROGRESS NOTES
HPI     Diabetic Eye Exam     Additional comments: Lab Results       Component                Value               Date                       LABA1C                   14.4                01/06/2019                 HGBA1C                   7.0 (H)             02/13/2023                      Comments    Pt c/o decreased vision at distance.          Last edited by Liz Corrales MA on 3/27/2023  3:24 PM.            Assessment /Plan     For exam results, see Encounter Report.    Diabetes mellitus type 2 without retinopathy    Diabetic cataract    Refractive errors      No Background Diabetic Retinopathy  Strict BG control, f/u w/ PCP, and annual DFE  Stressed importance of DM control to preserve vision    Moderate cataracts OU, not surgical  Follow annually.    Dispense Final Rx for glasses.  RTC 1 year  Discussed above and answered questions.

## 2023-04-11 DIAGNOSIS — E03.9 HYPOTHYROIDISM, UNSPECIFIED TYPE: ICD-10-CM

## 2023-04-11 RX ORDER — LEVOTHYROXINE SODIUM 125 UG/1
125 TABLET ORAL
Qty: 30 TABLET | Refills: 2 | Status: SHIPPED | OUTPATIENT
Start: 2023-04-11 | End: 2023-07-27

## 2023-04-11 NOTE — TELEPHONE ENCOUNTER
Refill Routing Note   Medication(s) are not appropriate for processing by [facility] for the following reason(s):      Patient seen in ED/Hospital since LOV with PCP    [facility] action(s):  Route            Appointments  past 12m or future 3m with PCP    Date Provider   Last Visit   3/22/2023 Wanda Cook DO   Next Visit   Visit date not found Wanda Cook DO   ED visits in past 90 days: 0        Note composed:11:36 AM 04/11/2023

## 2023-04-11 NOTE — TELEPHONE ENCOUNTER
No new care gaps identified.  [facility] Embedded Care Gaps. Reference number: [id number]. 4/11/2023   11:10:23 AM DASHAWNT

## 2023-04-19 ENCOUNTER — TELEPHONE (OUTPATIENT)
Dept: INTERNAL MEDICINE | Facility: CLINIC | Age: 68
End: 2023-04-19
Payer: MEDICARE

## 2023-04-19 DIAGNOSIS — E11.65 UNCONTROLLED TYPE 2 DIABETES MELLITUS WITH HYPERGLYCEMIA, WITH LONG-TERM CURRENT USE OF INSULIN: ICD-10-CM

## 2023-04-19 DIAGNOSIS — Z79.4 UNCONTROLLED TYPE 2 DIABETES MELLITUS WITH HYPERGLYCEMIA, WITH LONG-TERM CURRENT USE OF INSULIN: ICD-10-CM

## 2023-04-19 DIAGNOSIS — Z74.09 IMPAIRED MOBILITY: ICD-10-CM

## 2023-04-19 DIAGNOSIS — I69.354 HEMIPARESIS AFFECTING LEFT SIDE AS LATE EFFECT OF CEREBROVASCULAR ACCIDENT: Primary | ICD-10-CM

## 2023-04-19 RX ORDER — METFORMIN HYDROCHLORIDE 500 MG/1
1000 TABLET, EXTENDED RELEASE ORAL 2 TIMES DAILY WITH MEALS
Qty: 360 TABLET | Refills: 1 | Status: SHIPPED | OUTPATIENT
Start: 2023-04-19 | End: 2023-10-10

## 2023-04-19 NOTE — TELEPHONE ENCOUNTER
----- Message from Amaya Blake sent at 4/19/2023  4:02 PM CDT -----  Contact: [phone]  Pt is calling in regards to getting an orders for an electric scooter. Please call pt back at [phone]. Thanks KB

## 2023-04-19 NOTE — TELEPHONE ENCOUNTER
They decided on the electric scooter they said they talked to you about getting a wheelchair or scooter you said they could only one

## 2023-04-19 NOTE — TELEPHONE ENCOUNTER
Refill Decision Note   Krysta Manuel  is requesting a refill authorization.  Brief Assessment and Rationale for Refill:  Approve     Medication Therapy Plan:       Medication Reconciliation Completed: No   Comments:     Provider Staff:     Action is required for this patient.   Please see care gap opportunities below in Care Due Message.     Thanks!  [facility]     Appointments      Date Provider   Last Visit   3/22/2023 Wanda Cook DO   Next Visit   Visit date not found Wanda Cook DO     Note composed:1:33 PM 04/19/2023           Note composed:1:33 PM 04/19/2023

## 2023-04-19 NOTE — TELEPHONE ENCOUNTER
Care Due:                  Date            Visit Type   Department     Provider  --------------------------------------------------------------------------------                                EP -                              PRIMARY      [facility] INTERNAL  Last Visit: 03-      CARE ([facility])   MEDICINE       Wanda Cook  Next Visit: None Scheduled  None         None Found                                                            Last  Test          Frequency    Reason                     Performed    Due Date  --------------------------------------------------------------------------------    TSH.........  12 months..  levothyroxine............  07- 07-    [facility] Embedded Care Gaps. Reference number: [id number]. 4/19/2023   11:34:35 AM CDT

## 2023-05-05 ENCOUNTER — TELEPHONE (OUTPATIENT)
Dept: INTERNAL MEDICINE | Facility: CLINIC | Age: 68
End: 2023-05-05
Payer: MEDICARE

## 2023-05-05 NOTE — TELEPHONE ENCOUNTER
----- Message from Earline Stover sent at 5/5/2023  8:34 AM CDT -----  Regarding: peoples health  Name of Who is Calling: ARTEMIO THORNE [[id number]] [place] ( [place])      What is the request in detail: requesting clinical notes and face to face mobility device for power scooter for patient. Please advise       Can the clinic reply by [facility]: No      What Number to Call Back if not [email or web address]:  Fax [phone] contact [phone]

## 2023-05-11 ENCOUNTER — OFFICE VISIT (OUTPATIENT)
Dept: NEUROLOGY | Facility: CLINIC | Age: 68
End: 2023-05-11
Payer: MEDICARE

## 2023-05-11 ENCOUNTER — HOSPITAL ENCOUNTER (OUTPATIENT)
Dept: CARDIOLOGY | Facility: HOSPITAL | Age: 68
Discharge: HOME | End: 2023-05-11
Payer: MEDICARE

## 2023-05-11 VITALS
DIASTOLIC BLOOD PRESSURE: 85 MMHG | SYSTOLIC BLOOD PRESSURE: 150 MMHG | BODY MASS INDEX: 35.56 KG/M2 | HEIGHT: 64 IN | HEART RATE: 109 BPM | WEIGHT: 208.31 LBS

## 2023-05-11 DIAGNOSIS — F32.5 MAJOR DEPRESSION IN REMISSION: ICD-10-CM

## 2023-05-11 DIAGNOSIS — R41.3 OTHER AMNESIA: ICD-10-CM

## 2023-05-11 DIAGNOSIS — I51.9 HEART DISEASE: ICD-10-CM

## 2023-05-11 DIAGNOSIS — E78.5 HYPERLIPIDEMIA LDL GOAL <70: ICD-10-CM

## 2023-05-11 DIAGNOSIS — E11.65 UNCONTROLLED TYPE 2 DIABETES MELLITUS WITH HYPERGLYCEMIA, WITH LONG-TERM CURRENT USE OF INSULIN: ICD-10-CM

## 2023-05-11 DIAGNOSIS — F32.9 MAJOR DEPRESSIVE DISORDER, REMISSION STATUS UNSPECIFIED, UNSPECIFIED WHETHER RECURRENT: ICD-10-CM

## 2023-05-11 DIAGNOSIS — I10 HYPERTENSION GOAL BP (BLOOD PRESSURE) < 130/80: ICD-10-CM

## 2023-05-11 DIAGNOSIS — Z86.73 HISTORY OF STROKE: ICD-10-CM

## 2023-05-11 DIAGNOSIS — E03.9 HYPOTHYROIDISM (ACQUIRED): ICD-10-CM

## 2023-05-11 DIAGNOSIS — E11.51 TYPE II DIABETES MELLITUS WITH PERIPHERAL CIRCULATORY DISORDER: ICD-10-CM

## 2023-05-11 DIAGNOSIS — F41.9 ANXIETY: ICD-10-CM

## 2023-05-11 DIAGNOSIS — F02.811 MAJOR NEUROCOGNITIVE DISORDER DUE TO MULTIPLE ETIOLOGIES, WITH AGITATION: ICD-10-CM

## 2023-05-11 DIAGNOSIS — I69.354 HEMIPARESIS AFFECTING LEFT SIDE AS LATE EFFECT OF CEREBROVASCULAR ACCIDENT: ICD-10-CM

## 2023-05-11 DIAGNOSIS — Z79.4 UNCONTROLLED TYPE 2 DIABETES MELLITUS WITH HYPERGLYCEMIA, WITH LONG-TERM CURRENT USE OF INSULIN: ICD-10-CM

## 2023-05-11 DIAGNOSIS — F03.90 DEMENTIA WITHOUT BEHAVIORAL DISTURBANCE: ICD-10-CM

## 2023-05-11 DIAGNOSIS — R45.1 AGITATION: ICD-10-CM

## 2023-05-11 PROCEDURE — 99999 PR PBB SHADOW E&M-EST. PATIENT-LVL V: CPT | Mod: PBBFAC,,, | Performed by: NURSE PRACTITIONER

## 2023-05-11 PROCEDURE — 1100F PR PT FALLS ASSESS DOC 2+ FALLS/FALL W/INJURY/YR: ICD-10-PCS | Mod: CPTII,S$GLB,, | Performed by: NURSE PRACTITIONER

## 2023-05-11 PROCEDURE — 3066F PR DOCUMENTATION OF TREATMENT FOR NEPHROPATHY: ICD-10-PCS | Mod: CPTII,S$GLB,, | Performed by: NURSE PRACTITIONER

## 2023-05-11 PROCEDURE — 99205 PR OFFICE/OUTPT VISIT, NEW, LEVL V, 60-74 MIN: ICD-10-PCS | Mod: S$GLB,,, | Performed by: NURSE PRACTITIONER

## 2023-05-11 PROCEDURE — 93010 EKG 12-LEAD: ICD-10-PCS | Mod: ,,, | Performed by: INTERNAL MEDICINE

## 2023-05-11 PROCEDURE — 1126F PR PAIN SEVERITY QUANTIFIED, NO PAIN PRESENT: ICD-10-PCS | Mod: CPTII,S$GLB,, | Performed by: NURSE PRACTITIONER

## 2023-05-11 PROCEDURE — 1160F PR REVIEW ALL MEDS BY PRESCRIBER/CLIN PHARMACIST DOCUMENTED: ICD-10-PCS | Mod: CPTII,S$GLB,, | Performed by: NURSE PRACTITIONER

## 2023-05-11 PROCEDURE — 99205 OFFICE O/P NEW HI 60 MIN: CPT | Mod: S$GLB,,, | Performed by: NURSE PRACTITIONER

## 2023-05-11 PROCEDURE — 3079F PR MOST RECENT DIASTOLIC BLOOD PRESSURE 80-89 MM HG: ICD-10-PCS | Mod: CPTII,S$GLB,, | Performed by: NURSE PRACTITIONER

## 2023-05-11 PROCEDURE — 3077F SYST BP >= 140 MM HG: CPT | Mod: CPTII,S$GLB,, | Performed by: NURSE PRACTITIONER

## 2023-05-11 PROCEDURE — 3072F LOW RISK FOR RETINOPATHY: CPT | Mod: CPTII,S$GLB,, | Performed by: NURSE PRACTITIONER

## 2023-05-11 PROCEDURE — 99999 PR PBB SHADOW E&M-EST. PATIENT-LVL V: ICD-10-PCS | Mod: PBBFAC,,, | Performed by: NURSE PRACTITIONER

## 2023-05-11 PROCEDURE — 93005 ELECTROCARDIOGRAM TRACING: CPT

## 2023-05-11 PROCEDURE — 3077F PR MOST RECENT SYSTOLIC BLOOD PRESSURE >= 140 MM HG: ICD-10-PCS | Mod: CPTII,S$GLB,, | Performed by: NURSE PRACTITIONER

## 2023-05-11 PROCEDURE — 4010F PR ACE/ARB THEARPY RXD/TAKEN: ICD-10-PCS | Mod: CPTII,S$GLB,, | Performed by: NURSE PRACTITIONER

## 2023-05-11 PROCEDURE — 3051F HG A1C>EQUAL 7.0%<8.0%: CPT | Mod: CPTII,S$GLB,, | Performed by: NURSE PRACTITIONER

## 2023-05-11 PROCEDURE — 3061F NEG MICROALBUMINURIA REV: CPT | Mod: CPTII,S$GLB,, | Performed by: NURSE PRACTITIONER

## 2023-05-11 PROCEDURE — 3072F PR LOW RISK FOR RETINOPATHY: ICD-10-PCS | Mod: CPTII,S$GLB,, | Performed by: NURSE PRACTITIONER

## 2023-05-11 PROCEDURE — 1126F AMNT PAIN NOTED NONE PRSNT: CPT | Mod: CPTII,S$GLB,, | Performed by: NURSE PRACTITIONER

## 2023-05-11 PROCEDURE — 3066F NEPHROPATHY DOC TX: CPT | Mod: CPTII,S$GLB,, | Performed by: NURSE PRACTITIONER

## 2023-05-11 PROCEDURE — 3061F PR NEG MICROALBUMINURIA RESULT DOCUMENTED/REVIEW: ICD-10-PCS | Mod: CPTII,S$GLB,, | Performed by: NURSE PRACTITIONER

## 2023-05-11 PROCEDURE — 99417 PR PROLONGED SVC, OUTPT, W/WO DIRECT PT CONTACT,  EA ADDTL 15 MIN: ICD-10-PCS | Mod: S$GLB,,, | Performed by: NURSE PRACTITIONER

## 2023-05-11 PROCEDURE — 3008F PR BODY MASS INDEX (BMI) DOCUMENTED: ICD-10-PCS | Mod: CPTII,S$GLB,, | Performed by: NURSE PRACTITIONER

## 2023-05-11 PROCEDURE — 3079F DIAST BP 80-89 MM HG: CPT | Mod: CPTII,S$GLB,, | Performed by: NURSE PRACTITIONER

## 2023-05-11 PROCEDURE — 1100F PTFALLS ASSESS-DOCD GE2>/YR: CPT | Mod: CPTII,S$GLB,, | Performed by: NURSE PRACTITIONER

## 2023-05-11 PROCEDURE — 4010F ACE/ARB THERAPY RXD/TAKEN: CPT | Mod: CPTII,S$GLB,, | Performed by: NURSE PRACTITIONER

## 2023-05-11 PROCEDURE — 99417 PROLNG OP E/M EACH 15 MIN: CPT | Mod: S$GLB,,, | Performed by: NURSE PRACTITIONER

## 2023-05-11 PROCEDURE — 1159F MED LIST DOCD IN RCRD: CPT | Mod: CPTII,S$GLB,, | Performed by: NURSE PRACTITIONER

## 2023-05-11 PROCEDURE — 1160F RVW MEDS BY RX/DR IN RCRD: CPT | Mod: CPTII,S$GLB,, | Performed by: NURSE PRACTITIONER

## 2023-05-11 PROCEDURE — 3008F BODY MASS INDEX DOCD: CPT | Mod: CPTII,S$GLB,, | Performed by: NURSE PRACTITIONER

## 2023-05-11 PROCEDURE — 3288F PR FALLS RISK ASSESSMENT DOCUMENTED: ICD-10-PCS | Mod: CPTII,S$GLB,, | Performed by: NURSE PRACTITIONER

## 2023-05-11 PROCEDURE — 3288F FALL RISK ASSESSMENT DOCD: CPT | Mod: CPTII,S$GLB,, | Performed by: NURSE PRACTITIONER

## 2023-05-11 PROCEDURE — 3051F PR MOST RECENT HEMOGLOBIN A1C LEVEL 7.0 - < 8.0%: ICD-10-PCS | Mod: CPTII,S$GLB,, | Performed by: NURSE PRACTITIONER

## 2023-05-11 PROCEDURE — 1159F PR MEDICATION LIST DOCUMENTED IN MEDICAL RECORD: ICD-10-PCS | Mod: CPTII,S$GLB,, | Performed by: NURSE PRACTITIONER

## 2023-05-11 PROCEDURE — 93010 ELECTROCARDIOGRAM REPORT: CPT | Mod: ,,, | Performed by: INTERNAL MEDICINE

## 2023-05-11 RX ORDER — DONEPEZIL HYDROCHLORIDE 5 MG/1
5 TABLET, FILM COATED ORAL NIGHTLY
Qty: 30 TABLET | Refills: 11 | Status: SHIPPED | OUTPATIENT
Start: 2023-05-11 | End: 2023-08-29

## 2023-05-11 RX ORDER — LAMOTRIGINE 25 MG/1
25 TABLET ORAL 2 TIMES DAILY
Qty: 60 TABLET | Refills: 11 | Status: SHIPPED | OUTPATIENT
Start: 2023-05-11 | End: 2023-08-29

## 2023-05-11 NOTE — PROGRESS NOTES
Subjective:       Patient ID: Krysta Manuel is a 67 y.o. female.    Chief Complaint: Stroke      Referred by PCP: Dr. Wanda Cook, DO    HPI  The patient is new to me, she is here for memory loss and behavior changes. The patient is accompanied by daughter/caretaker. The patient is presenting memory changes and behavior changes  that began in 10/2022, per daughter it has worsened since then. The main problems the patient has are related to progressive short term memory loss and changes in behavior. For example, the patient would repeat the same question over and over again. The patient forget conversations had with family.  The patient is not forgetting where placed certain things, daughter states she don't manage anything since having the stroke, her daughter provides her with assistance and care. Patient is very inactive. She talks to herself, easily upset. The patient do not drive, and never learned to drive. No confusion around and inside the house. No trouble remembering the date and time. Patient daughter is managing patients medications and appointments. Patient is aware of major holidays and political changes. The patient is daughter is handling finances. The patient requires assistance with ADLs. Daughter prepare meals, daughter provide assistance with baths. Patient able to feed her self.  No hallucinations or delusions. No seizures. Agitation behavioral problems. No problems handling tools. 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 Ambulate short distances with cane and uses wheelchair. Stroke Risk factors Risck factors HTN, HLD, DM, No history of headaches. Hx of  hypothyroidism. Chronic DM type II. PA fib takes Cardizem.  No history of alcoholism. No history of B12 deficiency. Chronic anxiety and depression Zoloft. Referral to physiatrists was missed No history of STDs.  No history of HIV infection. No toxic exposures. Left arm  Hemiparesis, Stroke side  arm has abnormal movements at times per daughter. Left arm flaccid, gait instability. Urgency nocturia urinary incontinence. Takes ambiens for sleep partially helpful. Patient has decreased appetite. Multiple Siblings have dementia living ages (79, 75, onset). She was started on Namenda 10 mg BID by PCP on ,  tolerating well. Today MOCA 05- 25/30.      Review of Systems   Unable to perform ROS: Dementia   Constitutional:  Positive for activity change.   HENT:          Edentulous    Eyes: Negative.    Respiratory: Negative.     Cardiovascular: Negative.    Gastrointestinal: Negative.    Endocrine: Negative.    Genitourinary:  Positive for urgency.   Musculoskeletal:  Positive for arthralgias, back pain, gait problem and myalgias.   Allergic/Immunologic: Negative.    Neurological:  Positive for facial asymmetry, speech difficulty, weakness and numbness.   Hematological: Negative.    Psychiatric/Behavioral:  Positive for agitation, behavioral problems, confusion, decreased concentration, dysphoric mood and sleep disturbance.                Current Outpatient Medications:     aspirin (ECOTRIN) 81 MG EC tablet, Take 1 tablet (81 mg total) by mouth once daily., Disp: 90 tablet, Rfl: 1    blood sugar diagnostic Strp, To use 3 times daily, Disp: 200 strip, Rfl: 10    carvediloL (COREG) 25 MG tablet, Take 25 mg by mouth 2 (two) times daily., Disp: , Rfl:     clopidogreL (PLAVIX) 75 mg tablet, Take 75 mg by mouth once daily., Disp: , Rfl:     diltiaZEM (DILACOR XR) 240 MG CDCR, Take 240 mg by mouth once daily., Disp: , Rfl:     dulaglutide (TRULICITY) 4.5 mg/0.5 mL pen injector, Inject 4.5 mg into the skin every 7 days., Disp: 4 pen, Rfl: 6    EASY TOUCH LANCING DEVICE Misc, , Disp: , Rfl:     fluticasone propionate (FLONASE) 50 mcg/actuation nasal spray, 2 SPRAYS IN EACH NOSTRIL EVERY DAY AS NEEDED, Disp: 16 g, Rfl: 1    glipiZIDE (GLUCOTROL) 10 MG tablet, TAKE ONE TABLET BY MOUTH TWO  "TIMES A DAY BEFORE MEALS, Disp: 60 tablet, Rfl: 5    hydroCHLOROthiazide (HYDRODIURIL) 25 MG tablet, TAKE 1 TABLET (25 MG TOTAL) BY MOUTH ONCE DAILY., Disp: 90 tablet, Rfl: 3    HYDROcodone-acetaminophen (NORCO)  mg per tablet, , Disp: , Rfl: 0    irbesartan (AVAPRO) 300 MG tablet, TAKE 1 TABLET (300 MG TOTAL) BY MOUTH EVERY EVENING., Disp: 90 tablet, Rfl: 3    LANTUS SOLOSTAR U-100 INSULIN glargine 100 units/mL SubQ pen, Inject 66 Units into the skin every morning., Disp: 30 mL, Rfl: 5    levothyroxine (SYNTHROID) 125 MCG tablet, Take 1 tablet (125 mcg total) by mouth before breakfast., Disp: 30 tablet, Rfl: 2    linaCLOtide (LINZESS) 290 mcg Cap capsule, Take 1 capsule (290 mcg total) by mouth once daily., Disp: 30 capsule, Rfl: 11    memantine (NAMENDA) 10 MG Tab, Take 1 tablet (10 mg total) by mouth 2 (two) times daily., Disp: 180 tablet, Rfl: 1    metFORMIN (GLUCOPHAGE-XR) 500 MG ER 24hr tablet, TAKE 2 TABLETS (1,000 MG TOTAL) BY MOUTH 2 (TWO) TIMES DAILY WITH MEALS., Disp: 360 tablet, Rfl: 1    ondansetron (ZOFRAN-ODT) 4 MG TbDL, Take 4 mg by mouth every 8 (eight) hours as needed., Disp: , Rfl:     polyethylene glycol (GLYCOLAX) 17 gram/dose powder, Take 17 g by mouth once daily., Disp: 595 g, Rfl: 6    rosuvastatin (CRESTOR) 10 MG tablet, Take 1 tablet (10 mg total) by mouth nightly., Disp: 90 tablet, Rfl: 1    sertraline (ZOLOFT) 100 MG tablet, Take 1.5 tablets (150 mg total) by mouth once daily., Disp: 45 tablet, Rfl: 3    SURE COMFORT PEN NEEDLE 32 gauge x 5/32" Ndle, , Disp: , Rfl:     zolpidem (AMBIEN) 5 MG Tab, Take 1 tablet (5 mg total) by mouth nightly as needed (INSOMNIA)., Disp: 30 tablet, Rfl: 3    donepeziL (ARICEPT) 5 MG tablet, Take 1 tablet (5 mg total) by mouth every evening., Disp: 30 tablet, Rfl: 11    lamoTRIgine (LAMICTAL) 25 MG tablet, Take 1 tablet (25 mg total) by mouth 2 (two) times daily., Disp: 60 tablet, Rfl: 11    lancing device with lancets Kit, 1 each by Misc.(Non-Drug; " Combo Route) route 3 (three) times daily., Disp: 200 each, Rfl: 10  Past Medical History:   Diagnosis Date    Arthritis     Carotid artery disease     Dr. Jessa Szymanski--cardiology    Depression     DM II (diabetes mellitus, type II), controlled 12 years    Heart disease     Dr. Jessa Szymanski--cardiology    HTN (hypertension)     Hyperlipidemia     Hypothyroidism     Insomnia     Stroke 2010    Dr. Jessa Szymanski--cardiology    Vitamin D deficiency      Past Surgical History:   Procedure Laterality Date    CHOLECYSTECTOMY      COLONOSCOPY      HYSTERECTOMY      THYROIDECTOMY      TOTAL ABDOMINAL HYSTERECTOMY W/ BILATERAL SALPINGOOPHORECTOMY       Social History     Socioeconomic History    Marital status:    Tobacco Use    Smoking status: Never    Smokeless tobacco: Never   Substance and Sexual Activity    Alcohol use: Not Currently             Past/Current Medical/Surgical History, Past/Current Social History, Past/Current Family History and Past/Current Medications were reviewed in detail.        Objective:           VITAL SIGNS WERE REVIEWED      GENERAL APPEARANCE:     The patient looks comfortable, upset, cooperative.    BMI 35.76 KG     No signs of respiratory distress.    Normal breathing pattern.    No dysmorphic features    Normal eye contact.     GENERAL MEDICAL EXAM:    HEENT:  Head is atraumatic normocephalic.     No tender temporal arteries. Fundoscopic (Ophthalmoscopic) exam showed no disc edema.      Neck and Axillae: No JVD. No visible lesions.    No carotid bruits. No thyromegaly. No lymphadenopathy.    Cardiopulmonary: No cyanosis. No tachypnea. Normal respiratory effort.    Clear breath sounds. S1, S2 with regular rhythm . No murmurs.     Gastrointestinal/Urogenital:  No jaundice. No stomas or lesions. No visible hernias. No catheters.     Abdomen is soft non-tender. No masses or organomegaly.    Skin, Hair and Nails: No pathognonomic skin rash. No neurofibromatosis. No visible lesions.No  stigmata of autoimmune disease. No clubbing.    Skin is warm and moist. No palpable masses.    Limbs: No varicose veins. No visible swelling.    No palpable edema. Pulses are symmetric. Pedal pulses are palpable.      Muskoskeletal: No visible deformities.No visible lesions.    No spine tenderness. No signs of longstanding neuropathy. No dislocations or fractures.            Neurologic Exam     Mental Status   Oriented to person, place, and time.   Follows 2 step commands.   Attention: decreased. Concentration: decreased.   Speech: slurred   Level of consciousness: alert  Knowledge: poor and inconsistent with education. Able to perform simple calculations.   Able to name object. Able to read. Able to repeat. Able to write. Abnormal comprehension.     Cranial Nerves     CN II   Visual fields full to confrontation.   Visual acuity: normal  Right visual field deficit: none  Left visual field deficit: none     CN III, IV, VI   Pupils are equal, round, and reactive to light.  Extraocular motions are normal.   Right pupil: Size: 2 mm. Shape: regular. Reactivity: brisk. Consensual response: intact. Accommodation: intact.   Left pupil: Size: 2 mm. Shape: regular. Reactivity: brisk. Consensual response: intact. Accommodation: intact.   CN III: no CN III palsy  CN VI: no CN VI palsy  Nystagmus: none   Diplopia: none  Ophthalmoparesis: none  Upgaze: normal  Downgaze: normal  Conjugate gaze: present  Vestibulo-ocular reflex: present    CN V   Facial sensation intact.   Right facial sensation deficit: none  Left facial sensation deficit: complete  Left corneal reflex: abnormal    CN VII   Right facial weakness: none  Left facial weakness: peripheral    CN VIII   CN VIII normal.     CN IX, X   CN IX normal.   CN X normal.   Palate: asymmetric    CN XI   CN XI normal.   Right sternocleidomastoid strength: normal  Right trapezius strength: normal    CN XII   CN XII normal.   Tongue: not atrophic  Fasciculations: absent  Tongue  deviation: left    Motor Exam   Right arm tone: normal  Left arm tone: decreased  Right arm pronator drift: absent  Left arm pronator drift: present  Right leg tone: normal  Left leg tone: decreased    Strength   Strength 5/5 throughout.   Right neck flexion: 5/5  Left neck flexion: 0/5  Right neck extension: 5/5  Left neck extension: 0/5  Right deltoid: 5/5  Left deltoid: 0/5  Right biceps: 5/5  Left biceps: 0/5  Right triceps: 5/5  Left triceps: 0/5  Right wrist flexion: 5/5  Left wrist flexion: 0/5  Right wrist extension: 5/5  Left wrist extension: 0/5  Right interossei: 5/5  Left interossei: 0/5  Right iliopsoas: 5/5  Left iliopsoas: 4/5  Right quadriceps: 5/5  Left quadriceps: 4/5  Right hamstrin/5  Left hamstrin/5  Right glutei: 5/5  Left glutei: 4/5  Right anterior tibial: 5/5  Left anterior tibial: 4/5  Right posterior tibial: 5/5  Left posterior tibial: 4/5  Right peroneal: 5/5  Left peroneal: 4/5  Right gastroc: 5/5  Left gastroc: 4/5    Sensory Exam   Light touch normal.   Right arm light touch: normal  Left arm light touch: normal  Right leg light touch: normal  Left leg light touch: normal  Vibration normal.   Right arm vibration: normal  Left arm vibration: normal  Right leg vibration: normal  Left leg vibration: normal  Proprioception normal.   Right arm proprioception: normal  Left arm proprioception: normal  Right leg proprioception: normal  Left leg proprioception: normal  Pinprick normal.   Right arm pinprick: normal  Left arm pinprick: normal  Right leg pinprick: normal  Left leg pinprick: normal  Sensory deficit distribution on left: lateral cutaneous thigh  Graphesthesia: normal  Stereognosis: normal    Gait, Coordination, and Reflexes     Gait  Gait: normal    Coordination   Romberg: negative  Finger to nose coordination: normal  Heel to shin coordination: normal  Tandem walking coordination: normal    Tremor   Resting tremor: absent  Intention tremor: absent  Action tremor: left arm  and right arm    Reflexes   Right brachioradialis: 2+  Left brachioradialis: 2+  Right biceps: 2+  Left biceps: 2+  Right triceps: 2+  Left triceps: 2+  Right patellar: 2+  Left patellar: 2+  Right achilles: 2+  Left achilles: 2+  Right : 2+  Left : 2+  Right plantar: normal  Left plantar: normal  Right Ortez: absent  Left Ortez: absent  Right ankle clonus: absent  Left ankle clonus: absent  Right pendular knee jerk: absent  Left pendular knee jerk: absent      SHIN COGNITIVE ASSESSMENT (MOCA) TOTAL SCORE 30         NORMAL-MILD NCD 26-30    MILD DEMENTIA 20-25    Recall recovered with 2 cue    +1 for education 9 th grade completion     DATE 05-       TOTAL SCORE 25       VISUOSPATIAL EXECUTIVE (5) 3       NAMING (3) 3       ATTENTION (6) 5       LANGUAGE (3) 3       ABSTRACTION(2) 1       RECALL (5) 2       ORIENTATION (6) 6             Lab Results   Component Value Date    WBC 8.69 02/13/2023    HGB 10.9 (L) 02/13/2023    HCT 36.3 (L) 02/13/2023    MCV 87 02/13/2023     02/13/2023     Sodium   Date Value Ref Range Status   02/13/2023 140 136 - 145 mmol/L Final     Potassium   Date Value Ref Range Status   02/13/2023 4.2 3.5 - 5.1 mmol/L Final     Chloride   Date Value Ref Range Status   02/13/2023 105 95 - 110 mmol/L Final     CO2   Date Value Ref Range Status   02/13/2023 25 23 - 29 mmol/L Final     Glucose   Date Value Ref Range Status   02/13/2023 95 70 - 110 mg/dL Final     BUN   Date Value Ref Range Status   02/13/2023 14 8 - 23 mg/dL Final     Creatinine   Date Value Ref Range Status   02/13/2023 0.9 0.5 - 1.4 mg/dL Final     Calcium   Date Value Ref Range Status   02/13/2023 10.1 8.7 - 10.5 mg/dL Final     Total Protein   Date Value Ref Range Status   02/13/2023 7.4 6.0 - 8.4 g/dL Final     Albumin   Date Value Ref Range Status   02/13/2023 3.6 3.5 - 5.2 g/dL Final     Total Bilirubin   Date Value Ref Range Status   02/13/2023 0.4 0.1 - 1.0 mg/dL Final     Comment:     For  infants and newborns, interpretation of results should be based  on gestational age, weight and in agreement with clinical  observations.    Premature Infant recommended reference ranges:  Up to 24 hours.............<8.0 mg/dL  Up to 48 hours............<12.0 mg/dL  3-5 days..................<15.0 mg/dL  6-29 days.................<15.0 mg/dL       Alkaline Phosphatase   Date Value Ref Range Status   02/13/2023 70 55 - 135 U/L Final     AST   Date Value Ref Range Status   02/13/2023 15 10 - 40 U/L Final     ALT   Date Value Ref Range Status   02/13/2023 18 10 - 44 U/L Final     Anion Gap   Date Value Ref Range Status   02/13/2023 10 8 - 16 mmol/L Final     eGFR if    Date Value Ref Range Status   07/18/2022 >60.0 >60 mL/min/1.73 m^2 Final     eGFR if non    Date Value Ref Range Status   07/18/2022 58.0 (A) >60 mL/min/1.73 m^2 Final     Comment:     Calculation used to obtain the estimated glomerular filtration  rate (eGFR) is the CKD-EPI equation.        No results found for: [id number]  Lab Results   Component Value Date    TSH 2.804 07/18/2022    FREET4 0.96 06/06/2022   Labs Reviewed:    05-    FA >40.0^, HIV neg, Vitamin B 12 416 NL, TSH 0.637 NL, HC 8.5 NL,       EKG Results:    05-    QT interval 396 NL, HR 81     MRI Brain WO (Care Everywhere)     01-    Chronic lacunar type infarction of the right basal ganglia, with gliosis extending into the thalamus and centrum semiovale. The ventricles are normal in size. No extra-axial fluid collections. Expected arterial flow voids are demonstrated. Visualized sinuses and mastoids are clear. Bone marrow signal is within normal limits.    MRI ANGIOGRAM HEAD NECK WO (Care Everywhere)      01-  There is no large vessel occlusion or severe stenosis.   Distal vessel wall irregularity is in part due to motion artifact, but could also represent underlying small vessel vasculopathy.    Normal MRA of the neck    CT Head  WO (Care Everywhere)     2019    No CT evidence of an acute intracranial process  Old infarction right basal ganglia and corona radiata.    EEG Results:    2023       The EEG is normal in the awake and sleep states.       MRI Brain WO:    2023    No acute findings.     Mild atrophy.     Large chronic right basal ganglia/periventricular white matter infarct with associated white matter degeneration.     Chronic right maxillary sinus mucosal thickening.  Assessment:       1. Major neurocognitive disorder due to multiple etiologies, with agitation    2. Dementia without behavioral disturbance    3. History of stroke    4. Hemiparesis affecting left side as late effect of cerebrovascular accident    5. Major depressive disorder, remission status unspecified, unspecified whether recurrent    6. Hypothyroidism (acquired)    7. Hypertension goal BP (blood pressure) < 130/80    8. Heart disease    9. Major depression in remission    10. BMI 36.0-36.9,adult    11. Type II diabetes mellitus with peripheral circulatory disorder    12. Uncontrolled type 2 diabetes mellitus with hyperglycemia, with long-term current use of insulin    13. Hyperlipidemia LDL goal <70    14. Anxiety    15. Other amnesia    16. Agitation      TModified Amor Score (m-RS)      0  The patient has no residual symptoms.    1  The patient has no significant disability; able to carry out all pre-stroke activities.    2  The patient has slight disability; unable to carry out all pre-stroke activities but able to look after self without daily help.    3  The patient has moderate disability; requiring some external help but able to walk without the assistance of another individual.    4  The patient has moderately severe disability; unable to walk or attend to bodily functions without assistance of another individual.    5  The patient has severe disability; bedridden, incontinent, requires continuous care.    6  The patient has   (during the hospital stay or after discharge from the hospital).    Plan:         MAJOR NEUROCOGNITIVE DISORDER DUE TO MULTIPLE ETIOLOGIES WITH AGITATION/ VASCULAR VS AD DEMENTIA/  HX OF RT BASAL GANGLIA AND ENRIQUEZ RADIATA STROKE WITH DEFICITS/ MDD/DOREEN/ HYPOTHYROIDISM/        EVALUATION     Brain MRI to evaluate the frontal and temporal lobes.    T4, FA,HC, B12, HIV  RPR to evaluate for treatable causes of memory loss.    EEG 30 min     Comprehensive Neuropsychological Testing.    Explained to the patient and family that medications are intended to slow down the progression and not stop it or reverse the disease.The disease is relentless, progressive and so far cannot be controlled.     I counseled the patient and family that the rate of progression is extremely variable and the average (10 years) is an inaccurate measure.     Continue memantine/Namenda and titrate slowly to 10 mg BID.    Will start Donepezil/Aricept 5 mg QHS .The side effects include dizziness, diarrhea and nightmares and discussed with patient and family. If GI symptoms become an issue will try rivastigmine/Exelon patches.    EKG today     Follow up with Psychiatry Dr. Rodriguez     SYMPTOMATIC MANAGEMENT      Will start LTG 25 mg BID to help for agitation and mood stabilization.    Future consideration:     Risperdal 1 mg QHS to assist with psychotic symptoms and behavioral disturbances     Trazodone 50 mg QHS to help with insomnia. Instructed the family to watch for excessive sedation or paradoxical agitation.      HOME CARE     Pallative care     Falling Down Precautions. Gait is affected by the disease as well.     Avoid driving and access to firearms     Incremental 24/Care     Help with finances and decision making.    Join support group.    Proofing the house and use labeling.    Avoid antihistamines and anticholinergics.    Avoid changing routine.    Use written reminders.    Avoid multitasking.    Healthy diet, exercise (physical and  cognitive).    Good sleep hygiene.    For behavioral problems I recommended that family to try some of the following communication tips: Try not to react and stay calm, Don't argue , Do not use logic, Let the patient feel safe, Keep reminding the patient and use photos if necessary, Distract the patient, Search for things to distract the person, then talk about what you found. For example, talk about a photograph or keepsake or even food, Use gentle touching or hugging to show you care, Body language matters, Use a cooling off period if needed, when possible. If safe to do so, give the patient some space or breathing room. Will consider antipsychotic medications as a last resort because of the risk of CAD and CVD.    Recommend reading the book: The 36-Hour Day and there are many online and printed resources to help caregivers as well.     For More Information About Hallucinations, Delusions, and Paranoia in Alzheimer's   [facility]'s and related Dement[facility] Education and Referral ([facility]) [facility]   1-827.459.7609 (toll-free)   adear@[facility].nih.gov   www.[employer].nih.gov/alzheimers   The [employer] offers information and free print publications about Alzheimer's disease and related dementias for families, caregivers, and health professionals. [facility] Center staff answer telephone, email, and written requests and make referrals to local and national resources      PREVENTION OF DELIRIUM       1. Good hydration and avoid electrolyte imbalance  2. Recognize andtreat infections immediately especially UTI.  3. Bladder emptying and prevent constipation.   4. Provide stimulating activities and familiar objects  5. Use eyeglasses and hearing aids if needed.   6. Use simple and regular communication about people, current place and time  7. Mobility and range-of-motion exercises  8. Reduce noise, lighting and avoid sleep interruptions  9. Non-narcotic pain management.  10.Nondrug treatment for sleep  problems or anxiety  11. Avoid antihistamines.  12. Avoid narcotics.  13. Avoid benzodiazepines.       HX OF RT BRAIN BASAL GANGLIA AND ENRIQUEZ RADIATA STROKE WITH DEFICIT DYSARTHRIA, FACIAL DROOP LEFT SIDE, LEFT UPPER ARM PARAYRALTS LEFT  SIDE HEMIPARESIS    (2010 Right Brain Stroke with deficts left facial droop, Dysarthria, Left ARM HEMPARSIS, LEFT sided weakness/ Stroke Risk factors Risck factors HTN, HLD, DM)    Vascular Risk Factors (VRFs) stratification (BP, BS, BC control and Smoking Cessation) is the mainstay of stroke prevention (>90%). The benefit of antiplatelets is < 20% stroke risk reduction.       Wheelchair, Cane     Healthy diet and exercise    Avoid driving.    Call 911 if any SUDDEN:   Weakness  Numbness  Slurring of speech  Speech difficulty   Vertigo  Loss of balance  Loss of vision  Loss of hearing  Double vision  Trouble swallowing  Trouble breathing  Facial drooping        MEDICAL/SURGICAL COMORBIDITIES     All relevant medical comorbidities noted and managed by primary care physician and medical care team.          MISCELLANEOUS MEDICAL PROBLEMS       HEALTHY LIFESTYLE AND PREVENTATIVE CARE    Encouraged the patient to adhere to the age-appropriate health maintenance guidelines including screening tests and vaccinations.     Discussed the overall importance of healthy lifestyle, optimal weight, exercise, healthy diet, good sleep hygiene and avoiding drugs including smoking, alcohol and recreational drugs. The patient verbalized full understanding.       Advised the patient to follow COVID-19 prevention measures.       I spent 160 minutes  more than 50 % spent face to face with the patient  Time spent in counseling and coordination of care including discussions etiology of diagnosis, pathogenesis of diagnosis, prognosis of diagnosis,, diagnostic results, impression and recommendations, diagnostic studies, management, risks and benefits of treatment, instructions of disease self-management,  treatment instructions, follow up requirements, patient and family counseling/involvement in care compliance with treatment regimen. All of the patient's questions were answered during this discussion.       Zahraa Laurent, MSN NP      Collaborating Provider: Calin Matthew MD, FAAN Neurologist/Epileptologist

## 2023-05-11 NOTE — PATIENT INSTRUCTIONS
HOME CARE     Pallative care     Falling Down Precautions. Gait is affected by the disease as well.     Avoid driving and access to firearms     Incremental 24/Care     Help with finances and decision making.    Join support group.    Proofing the house and use labeling.    Avoid antihistamines and anticholinergics.    Avoid changing routine.    Use written reminders.    Avoid multitasking.    Healthy diet, exercise (physical and cognitive).    Good sleep hygiene.    For behavioral problems I recommended that family to try some of the following communication tips: Try not to react and stay calm, Don't argue , Do not use logic, Let the patient feel safe, Keep reminding the patient and use photos if necessary, Distract the patient, Search for things to distract the person, then talk about what you found. For example, talk about a photograph or keepsake or even food, Use gentle touching or hugging to show you care, Body language matters, Use a cooling off period if needed, when possible. If safe to do so, give the patient some space or breathing room. Will consider antipsychotic medications as a last resort because of the risk of CAD and CVD.    Recommend reading the book: The 36-Hour Day and there are many online and printed resources to help caregivers as well.     For More Information About Hallucinations, Delusions, and Paranoia in Alzheimer's   [facility] Alzheimer's and related Dementias Education and Referral (ADEAR) Center   1-695.973.9307 (toll-free)   adear@mercedes.nih.gov   www.mercedes.nih.gov/alzheimers   The National [employer]'s ADEAR Center offers information and free print publications about Alzheimer's disease and related dementias for families, caregivers, and health professionals. ADEAR Center staff answer telephone, email, and written requests and make referrals to local and national resources      PREVENTION OF DELIRIUM       1. Good hydration and avoid electrolyte imbalance  2. Recognize andtreat  infections immediately especially UTI.  3. Bladder emptying and prevent constipation.   4. Provide stimulating activities and familiar objects  5. Use eyeglasses and hearing aids if needed.   6. Use simple and regular communication about people, current place and time  7. Mobility and range-of-motion exercises  8. Reduce noise, lighting and avoid sleep interruptions  9. Non-narcotic pain management.  10.Nondrug treatment for sleep problems or anxiety  11. Avoid antihistamines.  12. Avoid narcotics.  13. Avoid benzodiazepines.

## 2023-05-15 ENCOUNTER — LAB VISIT (OUTPATIENT)
Dept: LAB | Facility: HOSPITAL | Age: 68
End: 2023-05-15
Attending: INTERNAL MEDICINE
Payer: MEDICARE

## 2023-05-15 ENCOUNTER — PATIENT OUTREACH (OUTPATIENT)
Dept: ADMINISTRATIVE | Facility: HOSPITAL | Age: 68
End: 2023-05-15
Payer: MEDICARE

## 2023-05-15 DIAGNOSIS — F02.811 MAJOR NEUROCOGNITIVE DISORDER DUE TO MULTIPLE ETIOLOGIES, WITH AGITATION: ICD-10-CM

## 2023-05-15 DIAGNOSIS — F03.90 DEMENTIA WITHOUT BEHAVIORAL DISTURBANCE: ICD-10-CM

## 2023-05-15 DIAGNOSIS — I51.9 HEART DISEASE: ICD-10-CM

## 2023-05-15 DIAGNOSIS — E78.5 HYPERLIPIDEMIA LDL GOAL <70: ICD-10-CM

## 2023-05-15 DIAGNOSIS — Z79.4 UNCONTROLLED TYPE 2 DIABETES MELLITUS WITH HYPERGLYCEMIA, WITH LONG-TERM CURRENT USE OF INSULIN: ICD-10-CM

## 2023-05-15 DIAGNOSIS — E11.65 UNCONTROLLED TYPE 2 DIABETES MELLITUS WITH HYPERGLYCEMIA, WITH LONG-TERM CURRENT USE OF INSULIN: ICD-10-CM

## 2023-05-15 DIAGNOSIS — I10 HYPERTENSION GOAL BP (BLOOD PRESSURE) < 130/80: ICD-10-CM

## 2023-05-15 DIAGNOSIS — F33.1 DEPRESSION, MAJOR, RECURRENT, MODERATE: ICD-10-CM

## 2023-05-15 DIAGNOSIS — R41.3 OTHER AMNESIA: ICD-10-CM

## 2023-05-15 DIAGNOSIS — F32.9 MAJOR DEPRESSIVE DISORDER, REMISSION STATUS UNSPECIFIED, UNSPECIFIED WHETHER RECURRENT: ICD-10-CM

## 2023-05-15 DIAGNOSIS — I69.354 HEMIPARESIS AFFECTING LEFT SIDE AS LATE EFFECT OF CEREBROVASCULAR ACCIDENT: ICD-10-CM

## 2023-05-15 DIAGNOSIS — Z86.73 HISTORY OF STROKE: ICD-10-CM

## 2023-05-15 DIAGNOSIS — E11.51 TYPE II DIABETES MELLITUS WITH PERIPHERAL CIRCULATORY DISORDER: ICD-10-CM

## 2023-05-15 DIAGNOSIS — E03.9 HYPOTHYROIDISM (ACQUIRED): ICD-10-CM

## 2023-05-15 DIAGNOSIS — F32.5 MAJOR DEPRESSION IN REMISSION: ICD-10-CM

## 2023-05-15 DIAGNOSIS — F41.9 ANXIETY: ICD-10-CM

## 2023-05-15 LAB
ESTIMATED AVG GLUCOSE: 154 MG/DL (ref 68–131)
FOLATE SERPL-MCNC: >40 NG/ML (ref 4–24)
HBA1C MFR BLD: 7 % (ref 4–5.6)
HCYS SERPL-SCNC: 8.5 UMOL/L (ref 4–15.5)
HIV 1+2 AB+HIV1 P24 AG SERPL QL IA: NORMAL
TSH SERPL DL<=0.005 MIU/L-ACNC: 0.64 UIU/ML (ref 0.4–4)
VIT B12 SERPL-MCNC: 416 PG/ML (ref 210–950)

## 2023-05-15 PROCEDURE — 83090 ASSAY OF HOMOCYSTEINE: CPT | Performed by: NURSE PRACTITIONER

## 2023-05-15 PROCEDURE — 82607 VITAMIN B-12: CPT | Performed by: NURSE PRACTITIONER

## 2023-05-15 PROCEDURE — 84443 ASSAY THYROID STIM HORMONE: CPT | Performed by: NURSE PRACTITIONER

## 2023-05-15 PROCEDURE — 83036 HEMOGLOBIN GLYCOSYLATED A1C: CPT | Performed by: NURSE PRACTITIONER

## 2023-05-15 PROCEDURE — 86038 ANTINUCLEAR ANTIBODIES: CPT | Performed by: NURSE PRACTITIONER

## 2023-05-15 PROCEDURE — 87389 HIV-1 AG W/HIV-1&-2 AB AG IA: CPT | Performed by: NURSE PRACTITIONER

## 2023-05-15 PROCEDURE — 36415 COLL VENOUS BLD VENIPUNCTURE: CPT | Performed by: NURSE PRACTITIONER

## 2023-05-15 PROCEDURE — 82746 ASSAY OF FOLIC ACID SERUM: CPT | Performed by: NURSE PRACTITIONER

## 2023-05-15 PROCEDURE — 86592 SYPHILIS TEST NON-TREP QUAL: CPT | Performed by: NURSE PRACTITIONER

## 2023-05-15 RX ORDER — SERTRALINE HYDROCHLORIDE 100 MG/1
TABLET, FILM COATED ORAL
Qty: 45 TABLET | Refills: 2 | Status: SHIPPED | OUTPATIENT
Start: 2023-05-15 | End: 2023-06-27

## 2023-05-15 NOTE — TELEPHONE ENCOUNTER
A request to refill Zoloft was received.  I called and spoke with the patient's daughter Kamille.  Kamille indicates that Zoloft is helping with depression and anxiety, without side effects.  She also notes that the patient is sleeping well with Ambien, without side effects.  She reports that the neurologist started a new medication (Lamictal) on May 11 for verbally fussing, without threats of harm to self or others, if she misunderstands a situation, with the daughter describing forgetfulness for facts and details and not currently paranoia or delusions; the daughter says that the patient calms down and apologizes for being loud.  The daughter describes the patient as no danger to self or others and with her needs being met by family members.  The daughter denies any current psychiatric concerns.  Zoloft refills will be authorized x3 months, and the daughter was instructed to contact the clinic for refills of psychiatric medications or any questions or concerns.

## 2023-05-15 NOTE — LETTER
May 15, 2023               [facility]  [address]  [facility] [address]  Phone: [phone]   Patient: Krysta Manuel   MR Number: [id number]   YOB: 1955       Dear Dr. Tristian John MD:         We are seeing Krysta Manuel, YOB: 1955, at [facility]. Wanda Cook DO is their primary care physician. To help with our health maintenance records could you please send the following:     Most recent Colonoscopy and Pathology Result with recommendations when to repeat procedure.     Repeat Colonoscopy in  _____ years                     Please send fax to [phone].    Thank-you in advance for your assistance. If you have any questions or concerns, please don't hesitate to contact me at [phone].     Sincerely,  Kaykay WOLF LPN Care Coordination   [facility]

## 2023-05-15 NOTE — TELEPHONE ENCOUNTER
Mrs. Manuel is a prior patients of Dr. Pierre. Please see attached refill. They were last seen on 1/25/23 and are scheduled to see you on  12/12/23. I have attached a refill request to last until that appointment date.

## 2023-05-15 NOTE — LETTER
June 20, 2023      Cecilio Todd MD             [facility] [address] [facility]  [address]  [facility] [address]  Phone: [phone]   Patient: Krysta Manuel   MR Number: [id number]   YOB: 1955          We are seeing Krysta ManuelRONNIO.B is 1955, at [facility]. Wanda Cook DO is their primary care physician. To help with our health maintenance records could you please send the following:     Most recent Colonoscopy with Path Report     Repeat Colonoscopy in  _____ years                                             Please send fax to [phone].    Thank-you in advance for your assistance. If you have any questions or concerns, please don't hesitate to contact me at [phone].     Sincerely,  Kaykay WOLF LPN Care Coordination   [facility]   Phone [phone],  Fax [phone]

## 2023-05-15 NOTE — PROGRESS NOTES
Per HM pt had colon completed in 2015.  Dr Marinelli progress note 2/20/23 indicates pt had colon completed in [id number] by Dr John.  Faxed request for report.

## 2023-05-16 LAB
ANA SER QL IF: NORMAL
RPR SER QL: NORMAL

## 2023-05-24 ENCOUNTER — HOSPITAL ENCOUNTER (OUTPATIENT)
Dept: PULMONOLOGY | Facility: HOSPITAL | Age: 68
Discharge: HOME | End: 2023-05-24
Attending: COLON & RECTAL SURGERY
Payer: MEDICARE

## 2023-05-24 DIAGNOSIS — Z86.73 HISTORY OF STROKE: ICD-10-CM

## 2023-05-24 DIAGNOSIS — F02.811 MAJOR NEUROCOGNITIVE DISORDER DUE TO MULTIPLE ETIOLOGIES, WITH AGITATION: ICD-10-CM

## 2023-05-24 DIAGNOSIS — F03.90 DEMENTIA WITHOUT BEHAVIORAL DISTURBANCE: ICD-10-CM

## 2023-05-24 PROCEDURE — 95819 EEG AWAKE AND ASLEEP: CPT | Mod: 26,,, | Performed by: PSYCHIATRY & NEUROLOGY

## 2023-05-24 PROCEDURE — 95819 EEG AWAKE AND ASLEEP: CPT

## 2023-05-24 PROCEDURE — 95819 PR EEG,W/AWAKE & ASLEEP RECORD: ICD-10-PCS | Mod: 26,,, | Performed by: PSYCHIATRY & NEUROLOGY

## 2023-05-24 NOTE — PROCEDURES
DATE EEG PERFORMED:  2023.      DATE EEG INTERPRETED: 2023.                   DURATION OF EE MINUTES.         LEVEL OF CONSCIOUSENESS    Awake and Sleep.         EEG BACKGROUND    The posterior dominant basic rhythm reaches 8-9 Hz, symmetric, reactive, well-modulated and well-sustained.         EEG CLASSIFICATION    Normal        IMPRESSION      The EEG is normal in the awake and sleep states.       There are no epileptiform discharges or lateralizing signs. No typical events were recorded. There is no electrographic evidence of seizure.There is no electrographic evidence of status epilepticus.         PLEASE NOTE THAT A NON-EPILEPTIFORM EEG DOES NOT RULE OUT EPILEPSY.        GRADY MUSE MD, FAAN    Diplomate, American Board of Psychiatry and Neurology    Diplomate, American Board of Clinical Neurophysiology

## 2023-05-25 ENCOUNTER — HOSPITAL ENCOUNTER (OUTPATIENT)
Dept: RADIOLOGY | Facility: HOSPITAL | Age: 68
Discharge: HOME | End: 2023-05-25
Attending: NURSE PRACTITIONER
Payer: MEDICARE

## 2023-05-25 DIAGNOSIS — E78.5 HYPERLIPIDEMIA LDL GOAL <70: ICD-10-CM

## 2023-05-25 DIAGNOSIS — F32.9 MAJOR DEPRESSIVE DISORDER, REMISSION STATUS UNSPECIFIED, UNSPECIFIED WHETHER RECURRENT: ICD-10-CM

## 2023-05-25 DIAGNOSIS — I10 HYPERTENSION GOAL BP (BLOOD PRESSURE) < 130/80: ICD-10-CM

## 2023-05-25 DIAGNOSIS — F03.90 DEMENTIA WITHOUT BEHAVIORAL DISTURBANCE: ICD-10-CM

## 2023-05-25 DIAGNOSIS — I51.9 HEART DISEASE: ICD-10-CM

## 2023-05-25 DIAGNOSIS — R41.3 OTHER AMNESIA: ICD-10-CM

## 2023-05-25 DIAGNOSIS — F41.9 ANXIETY: ICD-10-CM

## 2023-05-25 DIAGNOSIS — E11.51 TYPE II DIABETES MELLITUS WITH PERIPHERAL CIRCULATORY DISORDER: ICD-10-CM

## 2023-05-25 DIAGNOSIS — E11.65 UNCONTROLLED TYPE 2 DIABETES MELLITUS WITH HYPERGLYCEMIA, WITH LONG-TERM CURRENT USE OF INSULIN: ICD-10-CM

## 2023-05-25 DIAGNOSIS — E03.9 HYPOTHYROIDISM (ACQUIRED): ICD-10-CM

## 2023-05-25 DIAGNOSIS — I69.354 HEMIPARESIS AFFECTING LEFT SIDE AS LATE EFFECT OF CEREBROVASCULAR ACCIDENT: ICD-10-CM

## 2023-05-25 DIAGNOSIS — F02.811 MAJOR NEUROCOGNITIVE DISORDER DUE TO MULTIPLE ETIOLOGIES, WITH AGITATION: ICD-10-CM

## 2023-05-25 DIAGNOSIS — Z79.4 UNCONTROLLED TYPE 2 DIABETES MELLITUS WITH HYPERGLYCEMIA, WITH LONG-TERM CURRENT USE OF INSULIN: ICD-10-CM

## 2023-05-25 DIAGNOSIS — Z86.73 HISTORY OF STROKE: ICD-10-CM

## 2023-05-25 DIAGNOSIS — F32.5 MAJOR DEPRESSION IN REMISSION: ICD-10-CM

## 2023-05-25 PROCEDURE — 70551 MRI BRAIN WITHOUT CONTRAST: ICD-10-PCS | Mod: 26,,, | Performed by: RADIOLOGY

## 2023-05-25 PROCEDURE — 70551 MRI BRAIN STEM W/O DYE: CPT | Mod: TC,PO

## 2023-05-25 PROCEDURE — 70551 MRI BRAIN STEM W/O DYE: CPT | Mod: 26,,, | Performed by: RADIOLOGY

## 2023-05-29 NOTE — PROGRESS NOTES
MRI Brain WO:    05-    No acute findings.     Mild atrophy.     Large chronic right basal ganglia/periventricular white matter infarct with associated white matter degeneration.     Chronic right maxillary sinus mucosal thickening.    No acute stroke, Old large stroke noted.

## 2023-06-01 RX ORDER — CALCIUM CITRATE/VITAMIN D3 200MG-6.25
TABLET ORAL
Qty: 100 EACH | Refills: 0 | Status: SHIPPED | OUTPATIENT
Start: 2023-06-01 | End: 2023-08-18

## 2023-06-01 RX ORDER — PEN NEEDLE, DIABETIC 32GX 5/32"
NEEDLE, DISPOSABLE MISCELLANEOUS
Qty: 100 EACH | Refills: 0 | Status: SHIPPED | OUTPATIENT
Start: 2023-06-01 | End: 2023-07-03

## 2023-06-01 NOTE — TELEPHONE ENCOUNTER
Courtesy refill of Strips given for coverage of LEOBARDO Catalan. Please make sure patient has follow up scheduled with above provider.     Sofi Jim PA-C  Diabetes Management

## 2023-06-13 ENCOUNTER — OFFICE VISIT (OUTPATIENT)
Dept: DIABETES | Facility: CLINIC | Age: 68
End: 2023-06-13
Payer: MEDICARE

## 2023-06-13 DIAGNOSIS — E78.5 HYPERLIPIDEMIA, UNSPECIFIED HYPERLIPIDEMIA TYPE: ICD-10-CM

## 2023-06-13 DIAGNOSIS — I10 HYPERTENSION, UNSPECIFIED TYPE: ICD-10-CM

## 2023-06-13 DIAGNOSIS — E11.51 TYPE II DIABETES MELLITUS WITH PERIPHERAL CIRCULATORY DISORDER: Primary | ICD-10-CM

## 2023-06-13 PROCEDURE — 3051F PR MOST RECENT HEMOGLOBIN A1C LEVEL 7.0 - < 8.0%: ICD-10-PCS | Mod: CPTII,95,, | Performed by: NURSE PRACTITIONER

## 2023-06-13 PROCEDURE — 3066F PR DOCUMENTATION OF TREATMENT FOR NEPHROPATHY: ICD-10-PCS | Mod: CPTII,95,, | Performed by: NURSE PRACTITIONER

## 2023-06-13 PROCEDURE — 3066F NEPHROPATHY DOC TX: CPT | Mod: CPTII,95,, | Performed by: NURSE PRACTITIONER

## 2023-06-13 PROCEDURE — 1159F PR MEDICATION LIST DOCUMENTED IN MEDICAL RECORD: ICD-10-PCS | Mod: CPTII,95,, | Performed by: NURSE PRACTITIONER

## 2023-06-13 PROCEDURE — 3061F NEG MICROALBUMINURIA REV: CPT | Mod: CPTII,95,, | Performed by: NURSE PRACTITIONER

## 2023-06-13 PROCEDURE — 3072F PR LOW RISK FOR RETINOPATHY: ICD-10-PCS | Mod: CPTII,95,, | Performed by: NURSE PRACTITIONER

## 2023-06-13 PROCEDURE — 4010F ACE/ARB THERAPY RXD/TAKEN: CPT | Mod: CPTII,95,, | Performed by: NURSE PRACTITIONER

## 2023-06-13 PROCEDURE — 1160F RVW MEDS BY RX/DR IN RCRD: CPT | Mod: CPTII,95,, | Performed by: NURSE PRACTITIONER

## 2023-06-13 PROCEDURE — 4010F PR ACE/ARB THEARPY RXD/TAKEN: ICD-10-PCS | Mod: CPTII,95,, | Performed by: NURSE PRACTITIONER

## 2023-06-13 PROCEDURE — 3072F LOW RISK FOR RETINOPATHY: CPT | Mod: CPTII,95,, | Performed by: NURSE PRACTITIONER

## 2023-06-13 PROCEDURE — 1159F MED LIST DOCD IN RCRD: CPT | Mod: CPTII,95,, | Performed by: NURSE PRACTITIONER

## 2023-06-13 PROCEDURE — 1160F PR REVIEW ALL MEDS BY PRESCRIBER/CLIN PHARMACIST DOCUMENTED: ICD-10-PCS | Mod: CPTII,95,, | Performed by: NURSE PRACTITIONER

## 2023-06-13 PROCEDURE — 99214 PR OFFICE/OUTPT VISIT, EST, LEVL IV, 30-39 MIN: ICD-10-PCS | Mod: 95,,, | Performed by: NURSE PRACTITIONER

## 2023-06-13 PROCEDURE — 3051F HG A1C>EQUAL 7.0%<8.0%: CPT | Mod: CPTII,95,, | Performed by: NURSE PRACTITIONER

## 2023-06-13 PROCEDURE — 99214 OFFICE O/P EST MOD 30 MIN: CPT | Mod: 95,,, | Performed by: NURSE PRACTITIONER

## 2023-06-13 PROCEDURE — 3061F PR NEG MICROALBUMINURIA RESULT DOCUMENTED/REVIEW: ICD-10-PCS | Mod: CPTII,95,, | Performed by: NURSE PRACTITIONER

## 2023-06-13 RX ORDER — DULAGLUTIDE 4.5 MG/.5ML
4.5 INJECTION, SOLUTION SUBCUTANEOUS
Qty: 4 PEN | Refills: 6 | Status: SHIPPED | OUTPATIENT
Start: 2023-06-13 | End: 2023-09-13 | Stop reason: SDUPTHER

## 2023-06-13 NOTE — PROGRESS NOTES
Established Patient - Virtual Telehealth Visit     The patient location is: Home ([place])  The chief complaint leading to consultation is: Diabetes Follow-up  Visit type: Virtual visit with synchronous audio and video via Epic Haiku jessie  Total time spent with patient: 16 minutes     Each patient to whom I provide medical services by telemedicine is:  (1) informed of the relationship between the physician and patient and the respective role of any other health care provider with respect to management of the patient; and (2) notified that they may decline to receive medical services by telemedicine and may withdraw from such care at any time. Patient verbally consented to receive this service via voice-only telephone call.    PCP: Wanda Cook DO    Subjective:     Chief Complaint: Diabetes follow up.  Last visit (virtual) with me: 3/13/2023    HPI: 68 y.o.  female for diabetes follow up.   Type II diabetes for > 10 years.  Comorbidities: HTN, HLD, CAD, S/p CVA (2010), Left Hemiparesis, Hypothyroidism and Overweight by BMI     Family History of Type 2 DM: father  Known diabetes complications:  DKA/HHS: no  Retinopathy: no  Peripheral neuropathy: no  Nephropathy: no    Interval history:  Daughter with patient during visit.  Since last visit, daughter states that pt refuses Lantus administration at times.  Also poor choices of snacks.    Denies recent hospital admissions or ER visits.   Admits having isolated hypoglycemia, fasting BG reading 66 and 75.  Attributed to eating late breakfast.  Endorses diabetes related symptoms: None.    Blood glucose monitoring via fingerstick: 2-3 x/day   Per patient's daughter review of BG log, over the last 2 weeks   Fasting BG range 157- 200's   AC lunch range 103- low 300's  AC dinner range 152- low 200's    Continues to have BG excursions.    Activity level: Sedentary  Meal Planning:3 meals/day;1 snacks/day.  Eating regular pies for snacks.    Social history:     - Single, Lives with daughter, grandchild    Medication compliance all of the time, diet compliance most of the time.   To be enrolled in diabetes education classes.     Previous regimen: Humalog 75/25    Past failed treatment: N/A    Current DM Medications:  Diabetes Medications               dulaglutide (TRULICITY) 4.5 mg/0.5 mL pen injector Inject 4.5 mg into the skin every 7 days.    glipiZIDE (GLUCOTROL) 10 MG tablet TAKE ONE TABLET BY MOUTH TWO TIMES A DAY BEFORE MEALS    LANTUS SOLOSTAR U-100 INSULIN glargine 100 units/mL SubQ pen Inject 66 Units into the skin once daily.  Inconsistent    metFORMIN (GLUCOPHAGE-XR) 500 MG ER 24hr tablet TAKE 2 TABLETS (1,000 MG TOTAL) BY MOUTH 2 (TWO) TIMES DAILY WITH MEALS.     Allergies  Review of patient's allergies indicates:  No Known Allergies    Labs Reviewed:  Her most recent A1C is:  Lab Results   Component Value Date    HGBA1C 7.0 (H) 05/15/2023    HGBA1C 7.0 (H) 02/13/2023    HGBA1C 7.7 (H) 12/19/2022       Her most recent BMP is:  Lab Results   Component Value Date     02/13/2023    K 4.2 02/13/2023     02/13/2023    CO2 25 02/13/2023    BUN 14 02/13/2023    CREATININE 0.9 02/13/2023    CALCIUM 10.1 02/13/2023    ANIONGAP 10 02/13/2023    ESTGFRAFRICA >60.0 07/18/2022    EGFRNONAA 58.0 (A) 07/18/2022       No results found for: CPEPTIDE  No results found for: GLUTAMICACID    There is no height or weight on file to calculate BMI.    Wt Readings from Last 3 Encounters:   05/11/23 1003 94.5 kg (208 lb 5.4 oz)   03/22/23 0858 93.5 kg (206 lb 2.1 oz)   02/20/23 0938 93 kg (205 lb 0.4 oz)      Her blood sugar in clinic today is: Not assessed due to type of visit.    Diabetes Management Status  Statin: Taking  ACE/ARB: Taking    Screening or Prevention Patient's value Goal Complete/Controlled?   HgA1C Testing and Control   Lab Results   Component Value Date    HGBA1C 7.0 (H) 05/15/2023      Annually/Less than 8% Yes   Lipid profile : 02/13/2023 Annually Yes    LDL control Lab Results   Component Value Date    LDLCALC 46.0 (L) 02/13/2023    Annually/Less than 100 mg/dl  Yes   Nephropathy screening Lab Results   Component Value Date    MICALBCREAT Unable to calculate 02/13/2023     No results found for: PROTEINUA Annually Yes   Blood pressure BP Readings from Last 1 Encounters:   05/11/23 (!) 150/85    Less than 140/90 No   Dilated retinal exam : 03/27/2023 Annually Yes    Foot exam   : 05/05/2022 Annually No     Social History     Socioeconomic History    Marital status:    Tobacco Use    Smoking status: Never    Smokeless tobacco: Never   Substance and Sexual Activity    Alcohol use: Not Currently     Past Medical History:   Diagnosis Date    Arthritis     Carotid artery disease     Dr. Jessa Szymanski--cardiology    Depression     DM II (diabetes mellitus, type II), controlled 12 years    Heart disease     Dr. Jessa Szymanski--cardiology    HTN (hypertension)     Hyperlipidemia     Hypothyroidism     Insomnia     Stroke 2010    Dr. Jessa Szymanski--cardiology    Vitamin D deficiency      Review of Systems   Constitutional: Negative for activity change, appetite change, fatigue and unexpected weight change.   HENT: Negative for dental problem and trouble swallowing.    Eyes: Negative for visual disturbance.   Respiratory: Negative for chest tightness and shortness of breath.    Cardiovascular: Negative for chest pain, palpitations and leg swelling.   Gastrointestinal: Negative for abdominal pain, constipation, diarrhea, nausea and vomiting.   Endocrine: Negative for polydipsia, polyphagia and polyuria.   Genitourinary: Negative for difficulty urinating, dysuria, frequency and urgency.        Negative yeast infection   Musculoskeletal: Positive for gait problem (r/t left sided weakness, ambulates with quad cane). Negative for myalgias and neck pain.   Integumentary:  Negative for rash and wound.   Allergic/Immunologic: Negative.    Neurological: Positive for weakness  (residual left sided secondary to CVA). Negative for dizziness, syncope, numbness and headaches.   Hematological: Negative.    Psychiatric/Behavioral: Negative for confusion and sleep disturbance.   All other systems reviewed and are negative.     Objective:      Physical Exam  Constitutional:       General: Awake.      Appearance: Normal appearance. Well-developed and well-groomed.   HENT:      Head: Normocephalic and atraumatic.   Eyes:      General: Lids are normal. Gaze aligned appropriately.   Pulmonary:      Effort: Pulmonary effort is normal. No respiratory distress.   Neurological:      Mental Status: Alert and oriented to person, place, and time.   Psychiatric:         Attention and Perception: Attention normal.         Mood and Affect: Mood and affect normal.         Speech: Speech normal.         Behavior: Behavior normal.         Thought Content: Thought content normal.         Cognition and Memory: Cognition normal.         Judgment: Judgment normal.      Assessment / Plan:     Diagnoses and all orders for this visit:    Type II diabetes mellitus with peripheral circulatory disorder  -     dulaglutide (TRULICITY) 4.5 mg/0.5 mL pen injector; Inject 4.5 mg into the skin every 7 days.    Hypertension, unspecified type    Hyperlipidemia, unspecified hyperlipidemia type    Additional Plan Details:  - Condition: Sub optimal control, most recent A1C 7.0 % was completed 3 weeks ago.   - Monitor blood glucose:  4x daily.Goals reviewed. Maintain BG log. Bring to next visit for review.  - Hypoglycemia protocol: Reviewed and risk discussed.  Notify if BG readings below 80. Protocol printout provided.   - Diet: Limit carbohydrate intake 30-45 grams/meal, 3x daily and 15 grams/snack, limit 2/day.  Discuss options of healthy snacks.  - Activity: Recommend at least 150 minutes of exercise in a week.  - BP and LDL: Recommend lifestyle modifications and follow up with PCP for management.   - Medication Regimen:      Continue Trulicity 4.5 mg weekly  Continue Glipizide 10 tablet, 1 tablet before breakfast and 1 tablet before dinner, may do half tablet if eating lighter meals. Discussed timing of administration.  Continue Lantus 66 units in the morning (discussed consistency)  Continue Metformin 500 mg, 2 tablets twice daily with meals    - Medication consideration: Continue regimen.  - Lab results: A1C discussed.  - Health Maintenance standards addressed today:  A1c scheduled.   - Risks of uncontrolled DM explained. Importance of routine foot and eye exams reviewed. Scheduled as appropriate.  -The patient was explained the above plan and given opportunity to ask questions.  She understands, chooses and consents to this plan and accepts all the risks, which include but are not limited to the risks mentioned above.   - Labs ordered as above.  - CDE referral: Scheduled  - Follow up: 3 months virtual.        Charlotte T. Delacruz, FNP-C [facility]    A total of 16 minutes was spent in face to face time, of which over 50 % was spent in counseling patient on disease process, complications, treatment, and side effects of medications.

## 2023-06-13 NOTE — PATIENT INSTRUCTIONS
Medication Regimen:   Continue Trulicity 4.5 mg weekly  Continue Glipizide 10 tablet, 1 tablet before breakfast and 1 tablet before dinner, may do half tablet if eating lighter meals.  Continue Lantus 66 units in the morning   Continue Metformin 500 mg, 2 tablets twice daily with meals    Patient Instructions:  Carbohydrate Count: 30-45 grams/meal and 15 grams/snack with limit of 2 snacks per day.  Exercise: Goal is 150 minutes or more per week.  Bring meter and blood sugar log to each appointment.     Goals for Blood Sugar:  Fastin-130 mg/dl  2 hours after meal:  mg/dl  Before Bed: 100-150 mg/dl  If Blood sugar is below 70 mg/dl, DO NOT take diabetes medication. Eat first and then recheck blood sugar in 20 minutes.  Foods to eat if blood sugar is below 70 mg/dl  1/2 glass of orange juice   1/2 regular cola can   3-4 hard candies   3-4 small glucose tabs.   Foods to eat if blood sugar is below 50 mg/dl  1 glass of orange juice  1 regular cola can  8 hard candies  8 small glucose tabs.   If you have repeated eating/blood sugar recheck process 2 times and blood sugar still below 70 mg/dl, call 911.

## 2023-06-19 NOTE — PROGRESS NOTES
Dr John office called regarding colon. They deny any records for this pt.  Spoke with Ms Simental (daughter) asking where/ when colon completed. She it was in the past year or so, but could not remember the name of the dr. Said she used to be a pt of Dr Amandeep Sen in [place]. LM at that office requesting assistance with where colon done.  Ms Simental said she would try to find out where colon done or who with and contact me if she finds it.  Ms Simental also requested an appt for 3 mon follow up. Said her mom is getting sicker (weaker).  Appt scheduled, 6/26/23.

## 2023-06-20 NOTE — PROGRESS NOTES
Spoke with Dr Sen office regarding colon. States she could not find 1 in the pt chart, but that Dr Sen referred pt to either the [facility] or Dr Cecilio Todd MD.  Faxed request to Dr Cecilio Todd MD, since [facility] deny records, to see if they might have report.

## 2023-06-22 NOTE — PROGRESS NOTES
2013 colonoscopy received and uploaded to media. Sent to LPN-CC due to no repeat recommendation date.

## 2023-06-23 NOTE — PROGRESS NOTES
Spoke with Ms Simental (daughter) regarding colon received was from 2013. I was unable to find a more recent colon. She then said that it was her mom saying she had 1 recently but that she takes her to her appts and she does not believe she has had 1 more recently than that. She said her mom did not want to do a colonoscopy.    Attempted to contact Dr Cecilio Todd MD for colon recall date. Office was closed. Will try again another day..

## 2023-06-26 ENCOUNTER — OFFICE VISIT (OUTPATIENT)
Dept: INTERNAL MEDICINE | Facility: CLINIC | Age: 68
End: 2023-06-26
Payer: MEDICARE

## 2023-06-26 VITALS
HEIGHT: 64 IN | DIASTOLIC BLOOD PRESSURE: 80 MMHG | BODY MASS INDEX: 35 KG/M2 | TEMPERATURE: 97 F | OXYGEN SATURATION: 96 % | SYSTOLIC BLOOD PRESSURE: 148 MMHG | WEIGHT: 205 LBS | RESPIRATION RATE: 17 BRPM | HEART RATE: 94 BPM

## 2023-06-26 DIAGNOSIS — E11.51 TYPE II DIABETES MELLITUS WITH PERIPHERAL CIRCULATORY DISORDER: ICD-10-CM

## 2023-06-26 DIAGNOSIS — E78.5 HYPERLIPIDEMIA, UNSPECIFIED HYPERLIPIDEMIA TYPE: ICD-10-CM

## 2023-06-26 DIAGNOSIS — E03.9 HYPOTHYROIDISM (ACQUIRED): ICD-10-CM

## 2023-06-26 DIAGNOSIS — F03.90 DEMENTIA WITHOUT BEHAVIORAL DISTURBANCE: Primary | ICD-10-CM

## 2023-06-26 DIAGNOSIS — G47.00 INSOMNIA, UNSPECIFIED TYPE: ICD-10-CM

## 2023-06-26 DIAGNOSIS — F32.9 MAJOR DEPRESSIVE DISORDER, REMISSION STATUS UNSPECIFIED, UNSPECIFIED WHETHER RECURRENT: ICD-10-CM

## 2023-06-26 DIAGNOSIS — F41.9 ANXIETY: ICD-10-CM

## 2023-06-26 DIAGNOSIS — I10 HYPERTENSION, UNSPECIFIED TYPE: ICD-10-CM

## 2023-06-26 PROCEDURE — 99999 PR PBB SHADOW E&M-EST. PATIENT-LVL V: ICD-10-PCS | Mod: PBBFAC,,, | Performed by: PHYSICIAN ASSISTANT

## 2023-06-26 PROCEDURE — 3072F PR LOW RISK FOR RETINOPATHY: ICD-10-PCS | Mod: CPTII,S$GLB,, | Performed by: PHYSICIAN ASSISTANT

## 2023-06-26 PROCEDURE — 1159F PR MEDICATION LIST DOCUMENTED IN MEDICAL RECORD: ICD-10-PCS | Mod: CPTII,S$GLB,, | Performed by: PHYSICIAN ASSISTANT

## 2023-06-26 PROCEDURE — 99999 PR PBB SHADOW E&M-EST. PATIENT-LVL V: CPT | Mod: PBBFAC,,, | Performed by: PHYSICIAN ASSISTANT

## 2023-06-26 PROCEDURE — 4010F ACE/ARB THERAPY RXD/TAKEN: CPT | Mod: CPTII,S$GLB,, | Performed by: PHYSICIAN ASSISTANT

## 2023-06-26 PROCEDURE — 3079F DIAST BP 80-89 MM HG: CPT | Mod: CPTII,S$GLB,, | Performed by: PHYSICIAN ASSISTANT

## 2023-06-26 PROCEDURE — 3061F NEG MICROALBUMINURIA REV: CPT | Mod: CPTII,S$GLB,, | Performed by: PHYSICIAN ASSISTANT

## 2023-06-26 PROCEDURE — 3077F SYST BP >= 140 MM HG: CPT | Mod: CPTII,S$GLB,, | Performed by: PHYSICIAN ASSISTANT

## 2023-06-26 PROCEDURE — 1126F AMNT PAIN NOTED NONE PRSNT: CPT | Mod: CPTII,S$GLB,, | Performed by: PHYSICIAN ASSISTANT

## 2023-06-26 PROCEDURE — 1126F PR PAIN SEVERITY QUANTIFIED, NO PAIN PRESENT: ICD-10-PCS | Mod: CPTII,S$GLB,, | Performed by: PHYSICIAN ASSISTANT

## 2023-06-26 PROCEDURE — 3066F NEPHROPATHY DOC TX: CPT | Mod: CPTII,S$GLB,, | Performed by: PHYSICIAN ASSISTANT

## 2023-06-26 PROCEDURE — 99214 PR OFFICE/OUTPT VISIT, EST, LEVL IV, 30-39 MIN: ICD-10-PCS | Mod: S$GLB,,, | Performed by: PHYSICIAN ASSISTANT

## 2023-06-26 PROCEDURE — 99214 OFFICE O/P EST MOD 30 MIN: CPT | Mod: S$GLB,,, | Performed by: PHYSICIAN ASSISTANT

## 2023-06-26 PROCEDURE — 1160F RVW MEDS BY RX/DR IN RCRD: CPT | Mod: CPTII,S$GLB,, | Performed by: PHYSICIAN ASSISTANT

## 2023-06-26 PROCEDURE — 3072F LOW RISK FOR RETINOPATHY: CPT | Mod: CPTII,S$GLB,, | Performed by: PHYSICIAN ASSISTANT

## 2023-06-26 PROCEDURE — 3051F HG A1C>EQUAL 7.0%<8.0%: CPT | Mod: CPTII,S$GLB,, | Performed by: PHYSICIAN ASSISTANT

## 2023-06-26 PROCEDURE — 3061F PR NEG MICROALBUMINURIA RESULT DOCUMENTED/REVIEW: ICD-10-PCS | Mod: CPTII,S$GLB,, | Performed by: PHYSICIAN ASSISTANT

## 2023-06-26 PROCEDURE — 3008F BODY MASS INDEX DOCD: CPT | Mod: CPTII,S$GLB,, | Performed by: PHYSICIAN ASSISTANT

## 2023-06-26 PROCEDURE — 3288F FALL RISK ASSESSMENT DOCD: CPT | Mod: CPTII,S$GLB,, | Performed by: PHYSICIAN ASSISTANT

## 2023-06-26 PROCEDURE — 3008F PR BODY MASS INDEX (BMI) DOCUMENTED: ICD-10-PCS | Mod: CPTII,S$GLB,, | Performed by: PHYSICIAN ASSISTANT

## 2023-06-26 PROCEDURE — 3288F PR FALLS RISK ASSESSMENT DOCUMENTED: ICD-10-PCS | Mod: CPTII,S$GLB,, | Performed by: PHYSICIAN ASSISTANT

## 2023-06-26 PROCEDURE — 1101F PT FALLS ASSESS-DOCD LE1/YR: CPT | Mod: CPTII,S$GLB,, | Performed by: PHYSICIAN ASSISTANT

## 2023-06-26 PROCEDURE — 3066F PR DOCUMENTATION OF TREATMENT FOR NEPHROPATHY: ICD-10-PCS | Mod: CPTII,S$GLB,, | Performed by: PHYSICIAN ASSISTANT

## 2023-06-26 PROCEDURE — 3051F PR MOST RECENT HEMOGLOBIN A1C LEVEL 7.0 - < 8.0%: ICD-10-PCS | Mod: CPTII,S$GLB,, | Performed by: PHYSICIAN ASSISTANT

## 2023-06-26 PROCEDURE — 1159F MED LIST DOCD IN RCRD: CPT | Mod: CPTII,S$GLB,, | Performed by: PHYSICIAN ASSISTANT

## 2023-06-26 PROCEDURE — 4010F PR ACE/ARB THEARPY RXD/TAKEN: ICD-10-PCS | Mod: CPTII,S$GLB,, | Performed by: PHYSICIAN ASSISTANT

## 2023-06-26 PROCEDURE — 3077F PR MOST RECENT SYSTOLIC BLOOD PRESSURE >= 140 MM HG: ICD-10-PCS | Mod: CPTII,S$GLB,, | Performed by: PHYSICIAN ASSISTANT

## 2023-06-26 PROCEDURE — 1160F PR REVIEW ALL MEDS BY PRESCRIBER/CLIN PHARMACIST DOCUMENTED: ICD-10-PCS | Mod: CPTII,S$GLB,, | Performed by: PHYSICIAN ASSISTANT

## 2023-06-26 PROCEDURE — 1101F PR PT FALLS ASSESS DOC 0-1 FALLS W/OUT INJ PAST YR: ICD-10-PCS | Mod: CPTII,S$GLB,, | Performed by: PHYSICIAN ASSISTANT

## 2023-06-26 PROCEDURE — 3079F PR MOST RECENT DIASTOLIC BLOOD PRESSURE 80-89 MM HG: ICD-10-PCS | Mod: CPTII,S$GLB,, | Performed by: PHYSICIAN ASSISTANT

## 2023-06-26 RX ORDER — PIOGLITAZONEHYDROCHLORIDE 15 MG/1
15 TABLET ORAL EVERY MORNING
COMMUNITY
Start: 2023-04-17 | End: 2023-09-13 | Stop reason: DRUGHIGH

## 2023-06-26 RX ORDER — GABAPENTIN 100 MG/1
100 CAPSULE ORAL
COMMUNITY
Start: 2023-06-12

## 2023-06-26 RX ORDER — ESCITALOPRAM OXALATE 20 MG/1
20 TABLET ORAL
COMMUNITY
End: 2023-06-26

## 2023-06-26 RX ORDER — ATORVASTATIN CALCIUM 40 MG/1
40 TABLET, FILM COATED ORAL NIGHTLY
COMMUNITY
Start: 2023-04-17 | End: 2023-06-26

## 2023-06-26 RX ORDER — METOPROLOL SUCCINATE 100 MG/1
100 TABLET, EXTENDED RELEASE ORAL
COMMUNITY
Start: 2023-04-18 | End: 2023-10-16

## 2023-06-26 NOTE — PROGRESS NOTES
"Subjective:      Patient ID: Krysta Manuel is a 68 y.o. female.    Chief Complaint: 3 Month Follow-Up    Patient is known to me, being seen today for 3mth f/u.     HTN- irbesartan 300mg, toprol 100mg, cardizem 240mg, HCTZ 25mg   Dementia- followed by Neurology  DM- A1c 7.0%, trulicity 4.5mg, glipizide 10mg, lantus 66 units, metformin 1000mg bid, actos 15mg   HLD- on statin therapy   Anxiety and depression, insomnia- ambien 5mg, zoloft 100mg, followed by Psych    Thyroid- synthroid 125mcg     External Cardiologist Dr. Garcia     Followed by external Pain Med for chronic back pain Rx Norco 10, gabapentin 100mg    Present with daughter who is concerned about the number and combination of medications on her med list   Reports Pain Med will not prescribe Norco unless patient is taking gabapentin as well     Last visit March 2023 with PCP.    Review of Systems   Constitutional:  Negative for chills, diaphoresis and fever.   HENT:  Negative for congestion, rhinorrhea and sore throat.    Respiratory:  Negative for cough, shortness of breath and wheezing.    Gastrointestinal:  Negative for abdominal pain, constipation, diarrhea, nausea and vomiting.   Musculoskeletal:  Positive for back pain (chronic).   Skin:  Negative for rash.   Neurological:  Negative for dizziness, light-headedness and headaches.     Objective:   BP (!) 148/80   Pulse 94   Temp 97.2 °F (36.2 °C)   Resp 17   Ht 5' 4" (1.626 m)   Wt 93 kg (205 lb 0.4 oz)   SpO2 96%   BMI 35.19 kg/m²   Physical Exam  Constitutional:       General: She is not in acute distress.     Appearance: She is well-developed. She is not ill-appearing or diaphoretic.   HENT:      Head: Normocephalic and atraumatic.      Right Ear: External ear normal.      Left Ear: External ear normal.   Eyes:      General: Lids are normal.         Right eye: No discharge.         Left eye: No discharge.      Conjunctiva/sclera: Conjunctivae normal.      Right eye: Right conjunctiva is not " injected.      Left eye: Left conjunctiva is not injected.   Pulmonary:      Effort: Pulmonary effort is normal. No respiratory distress.   Skin:     General: Skin is warm and dry.      Findings: No rash.   Neurological:      Mental Status: She is alert and oriented to person, place, and time.   Psychiatric:         Speech: Speech normal.         Behavior: Behavior normal.         Thought Content: Thought content normal.         Judgment: Judgment normal.     Assessment:      1. Dementia without behavioral disturbance    2. Anxiety    3. Major depressive disorder, remission status unspecified, unspecified whether recurrent    4. Hyperlipidemia, unspecified hyperlipidemia type    5. Hypertension, unspecified type    6. Hypothyroidism (acquired)    7. Type II diabetes mellitus with peripheral circulatory disorder    8. Insomnia, unspecified type       Plan:   Dementia without behavioral disturbance   Followed by Neuro     Anxiety   Followed by Psych    Major depressive disorder, remission status unspecified, unspecified whether recurrent   Followed by Psych     Hyperlipidemia, unspecified hyperlipidemia type  -     Lipid Panel; Future; Expected date: 06/26/2023    Hypertension, unspecified type  -     CBC Auto Differential; Future; Expected date: 06/26/2023  -     Comprehensive Metabolic Panel; Future; Expected date: 06/26/2023    Hypothyroidism (acquired)  -     TSH; Future; Expected date: 06/26/2023  -     T4, Free; Future; Expected date: 06/26/2023    Type II diabetes mellitus with peripheral circulatory disorder  -     Hemoglobin A1C; Future; Expected date: 06/26/2023    Insomnia, unspecified type   Followed by Psych     Meds reviewed in detail    Patient/daughter to schedule f/u with Cardiologist     They may be interested in second opinion Pain Med specialist, if they need a referral they will let us know     3mth f/u with routine labs prior     Discussed worsening signs/symptoms and when to return to clinic or  go to ED.   Patient expresses understanding and agrees with treatment plan.

## 2023-06-27 ENCOUNTER — OFFICE VISIT (OUTPATIENT)
Dept: PSYCHIATRY | Facility: CLINIC | Age: 68
End: 2023-06-27
Payer: MEDICARE

## 2023-06-27 DIAGNOSIS — F33.1 DEPRESSION, MAJOR, RECURRENT, MODERATE: ICD-10-CM

## 2023-06-27 DIAGNOSIS — F41.9 ANXIETY: ICD-10-CM

## 2023-06-27 DIAGNOSIS — Z86.73 HISTORY OF STROKE: ICD-10-CM

## 2023-06-27 DIAGNOSIS — E11.51 TYPE II DIABETES MELLITUS WITH PERIPHERAL CIRCULATORY DISORDER: ICD-10-CM

## 2023-06-27 DIAGNOSIS — I69.354 HEMIPARESIS AFFECTING LEFT SIDE AS LATE EFFECT OF CEREBROVASCULAR ACCIDENT: Primary | ICD-10-CM

## 2023-06-27 PROCEDURE — 99215 PR OFFICE/OUTPT VISIT, EST, LEVL V, 40-54 MIN: ICD-10-PCS | Mod: 95,,, | Performed by: PSYCHIATRY & NEUROLOGY

## 2023-06-27 PROCEDURE — 3072F PR LOW RISK FOR RETINOPATHY: ICD-10-PCS | Mod: CPTII,95,, | Performed by: PSYCHIATRY & NEUROLOGY

## 2023-06-27 PROCEDURE — 3061F PR NEG MICROALBUMINURIA RESULT DOCUMENTED/REVIEW: ICD-10-PCS | Mod: CPTII,95,, | Performed by: PSYCHIATRY & NEUROLOGY

## 2023-06-27 PROCEDURE — 3066F NEPHROPATHY DOC TX: CPT | Mod: CPTII,95,, | Performed by: PSYCHIATRY & NEUROLOGY

## 2023-06-27 PROCEDURE — 4010F PR ACE/ARB THEARPY RXD/TAKEN: ICD-10-PCS | Mod: CPTII,95,, | Performed by: PSYCHIATRY & NEUROLOGY

## 2023-06-27 PROCEDURE — 3072F LOW RISK FOR RETINOPATHY: CPT | Mod: CPTII,95,, | Performed by: PSYCHIATRY & NEUROLOGY

## 2023-06-27 PROCEDURE — 3066F PR DOCUMENTATION OF TREATMENT FOR NEPHROPATHY: ICD-10-PCS | Mod: CPTII,95,, | Performed by: PSYCHIATRY & NEUROLOGY

## 2023-06-27 PROCEDURE — 3051F PR MOST RECENT HEMOGLOBIN A1C LEVEL 7.0 - < 8.0%: ICD-10-PCS | Mod: CPTII,95,, | Performed by: PSYCHIATRY & NEUROLOGY

## 2023-06-27 PROCEDURE — 4010F ACE/ARB THERAPY RXD/TAKEN: CPT | Mod: CPTII,95,, | Performed by: PSYCHIATRY & NEUROLOGY

## 2023-06-27 PROCEDURE — 3061F NEG MICROALBUMINURIA REV: CPT | Mod: CPTII,95,, | Performed by: PSYCHIATRY & NEUROLOGY

## 2023-06-27 PROCEDURE — 99215 OFFICE O/P EST HI 40 MIN: CPT | Mod: 95,,, | Performed by: PSYCHIATRY & NEUROLOGY

## 2023-06-27 PROCEDURE — 3051F HG A1C>EQUAL 7.0%<8.0%: CPT | Mod: CPTII,95,, | Performed by: PSYCHIATRY & NEUROLOGY

## 2023-06-27 RX ORDER — SERTRALINE HYDROCHLORIDE 100 MG/1
150 TABLET, FILM COATED ORAL DAILY
Qty: 135 TABLET | Refills: 1 | Status: SHIPPED | OUTPATIENT
Start: 2023-06-27 | End: 2023-12-12 | Stop reason: ALTCHOICE

## 2023-06-27 NOTE — PROGRESS NOTES
Telehealth Session Info    The patient location is: Patient's home in [place], [place] .  Patient reported that his/her location at the time of this visit was in the [facility]     I also informed patient of the following:     Mario Alberto Pierre MD  is an employee of [facility] [place], [place]      Each patient to whom he or she provides medical services by telemedicine is: (1) informed of the relationship between the physician and patient and the respective role of any other health care provider with respect to management of the patient; and (2) notified that he or she may decline to receive medical services by telemedicine and may withdraw from such care at any time.    If technology issues arise during call,  pt informed that MD will attempt to call pt back / as well:  pt may call office phone: [phone] in attempt to reconnect.    If pt has questions related to privacy practices or records: pt instructed to contact [facility] Information Department: [phone]    Pt informed that IF acute concerns to: Dial 911 or go to nearest Emergency Room (ER)    Understanding Expressed    Who present: pt and daughter Susan, 39 yr old (lives with her); susan's son who 15 yr old also lives in home     Note 1st follow up with Xochitl CAMPO /  pt has history STROKE. / pt and  39 yr old daughter live together.     Note: Pt desires likes [facility]      Krysta Manuel   1955 06/27/2023       Disclaimer: Evaluation and treatment is based on information presented to date. Any new information may affect assessment and findings.        S: Patient's Own Perception of Condition (& Side Effects) : no side effects     O:      CURRENT PRESENTATION:      Content    Speak a bit  (mild) slurred tho comprehendable. Goal directed     NOTE Neurology writing for dementia meds / as well neuro added low dose lamictal for irritability    Oriented x 3    Mood ok    Likes meds     Clarification as to Rx Ambien  and  Zoloft     Meds: as per [place] [facility]: Dr VINNY John (w/ comprehensive pain mgt)  has been writing for her Ambien / Dr John is also writing for her pain meds ; so makes sense to defer to him to write her ambien as per [place] [facility] / If any issues with that / contact [facility]    This Psyc MD did confirm that she DOES have Ambien as was filled last Leslee 10  2023    Also this psyc MD did place order for zoloft 100 mg tab (1 and 1/2 tab daily) #135  / 3 month supply with refill to cover until Dr Rodriguez      Self Ratings:     GAD7 6/27/2023 1/25/2023 8/17/2022   1. Feeling nervous, anxious, or on edge? 2 1 1   2. Not being able to stop or control worrying? 2 1 2   3. Worrying too much about different things? 2 1 2   4. Trouble relaxing? 2 2 1   5. Being so restless that it is hard to sit still? 2 0 1   6. Becoming easily annoyed or irritable? 2 0 1   7. Feeling afraid as if something awful might happen? 2 0 1   8. If you checked off any problems, how difficult have these problems made it for you to do your work, take care of things at home, or get along with other people? 1 1 1   DOREEN-7 Score 14 5 9      Depression Patient Health Questionnaire 6/27/2023 1/25/2023 9/6/2022 8/17/2022 9/28/2021   Over the last two weeks how often have you been bothered by little interest or pleasure in doing things Several days Several days Not at all Several days Several days   Over the last two weeks how often have you been bothered by feeling down, depressed or hopeless Nearly every day Several days Not at all More than half the days Several days   PHQ-2 Total Score 4 2 0 3 2   Over the last two weeks how often have you been bothered by trouble falling or staying asleep, or sleeping too much More than half the days More than half the days - More than half the days More than half the days   Over the last two weeks how often have you been bothered by feeling tired or having little energy Several days Several days - Several days  Several days   Over the last two weeks how often have you been bothered by a poor appetite or overeating Nearly every day Several days - Several days Not at all   Over the last two weeks how often have you been bothered by feeling bad about yourself - or that you are a failure or have let yourself or your family down More than half the days More than half the days - Several days Not at all   Over the last two weeks how often have you been bothered by trouble concentrating on things, such as reading the newspaper or watching television Several days Several days - More than half the days Several days   Over the last two weeks how often have you been bothered by moving or speaking so slowly that other people could have noticed. Or the opposite - being so fidgety or restless that you have been moving around a lot more than usual. More than half the days Several days - More than half the days Not at all   Over the last two weeks how often have you been bothered by thoughts that you would be better off dead, or of hurting yourself Not at all Not at all - Not at all Not at all   If you checked off any problems, how difficult have these problems made it for you to do your work, take care of things at home or get along with other people? Somewhat difficult Somewhat difficult - Somewhat difficult Not difficult at all   Total Score 15 10 - 12 6   Interpretation Moderately Severe Moderate - Moderate Mild         Constitutional Health Concerns:      Review of Systems   Constitutional: Negative.    HENT: Negative.     Eyes: Negative.    Respiratory: Negative.     Cardiovascular:         HTN    Gastrointestinal: Negative.    Genitourinary: Negative.    Musculoskeletal: Negative.    Skin: Negative.    Neurological:         History Stroke   Endo/Heme/Allergies: Negative.      Wt Readings from Last 12 Encounters:   06/26/23 93 kg (205 lb 0.4 oz)   05/11/23 94.5 kg (208 lb 5.4 oz)   03/22/23 93.5 kg (206 lb 2.1 oz)   02/20/23 93 kg  (205 lb 0.4 oz)   02/13/23 95.9 kg (211 lb 6.7 oz)   11/16/22 95.9 kg (211 lb 6.7 oz)   09/06/22 93.9 kg (207 lb)   08/26/22 94.3 kg (207 lb 14.3 oz)   07/14/22 94.2 kg (207 lb 10.8 oz)   06/30/22 93.3 kg (205 lb 11 oz)   06/06/22 93.3 kg (205 lb 11 oz)   05/26/22 94.7 kg (208 lb 12.4 oz)      Laboratory Data   Date/Time Component Value Flag Lab Status   07/18/22 0937 TSH 2.804 -- Final result   07/18/22 0937 HDL 36 Important  Low Final result   07/18/22 0937 CHOL 90 Important  Low Final result   07/18/22 0937 TRIG 60 -- Final result   07/18/22 0937 LDLCALC 42.0 Important  Low Final result   07/18/22 0937 CHOLHDL 40.0 -- Final result   07/18/22 0937 NONHDLCHOL 54 -- Final result   07/18/22 0937 TOTALCHOLEST 2.5 -- Final result   12/19/22 1115 HGBA1C 7.7 Important  High Final result   01/06/19 0000 LABA1C 14.4 -- Final result   12/29/21 1124 COLORU Yellow -- Final result   12/29/21 1124 SPECGRAV 1.025 -- Final result   12/29/21 1124 PHUR 6.0 -- Final result   12/29/21 1124 GLUCOSEUR Trace -- Final result   12/29/21 1124 KETONESU Neg -- Final result   12/29/21 1124 NITRITE Neg -- Final result   12/29/21 1124 WBCUR Neg -- Final result   12/29/21 1124 UROBILINOGEN 1.0 -- Final result   12/29/21 1124 RBCUR Neg -- Final result   09/06/22 1449 POCGLU 152 Important  Abnormal Final result   12/29/21 1133 WBC 10.1 -- Final result   12/29/21 1133 RBC 4.38 -- Final result   12/29/21 1133 HGB 11.7 -- Final result   12/29/21 1133 HCT 36.4 -- Final result   12/29/21 1133 MCH 26.7 Important  Low Final result   12/29/21 1133 RDW 14.1 -- Final result   12/29/21 1133  -- Final result   12/29/21 1133 MPV 10.3 -- Final result   05/25/21 1539 LYMPHS 24 -- Final result   07/18/22 0937  -- Final result   07/18/22 0937 BUN 25 Important  High Final result   07/18/22 0937 CREATININE 1.0 -- Final result   07/18/22 0937 CALCIUM 9.9 -- Final result   07/18/22 0937  -- Final result   07/18/22 0937 K 4.6 -- Final result    07/18/22 0937  -- Final result   07/18/22 0937 PROT 6.9 -- Final result   07/18/22 0937 ALBUMIN 3.7 -- Final result   07/18/22 0937 BILITOT 0.3 -- Final result   07/18/22 0937 AST 17 -- Final result   07/18/22 0937 ALKPHOS 63 -- Final result   07/18/22 0937 CO2 23 -- Final result   07/18/22 0937 ALT 21 -- Final result   07/18/22 0937 ANIONGAP 10 -- Final result   07/18/22 0937 EGFRNONAA 58.0 Important  Abnormal Final result   07/18/22 0937 ESTGFRAFRICA >60.0 -- Final result   11/16/20 1513 MG 1.8 -- Final result   12/29/21 1133 MCV 83.1 -- Final result       Mental Status Exam:      Appearance: casual   Oriented: x 3   Attitude: cooperative   Eye Contact: good  Behavior: calm     Mood: says feeling ok  Cognition: alert  Concentration: grossly intact   Affect: appropriate range      Anxiety: mild     Thought Process: goal directed     Speech:       Volume : WNL       Quantity WNL       Quality: appears to openly answer questions      Threats: no SI / no HI     Psychosis: denies all      Estimate of Intellectual Function: average   Impulse Control: no thoughts of harm to self/ others      Musculoskeletal:  no tremor     Social: : pt and daughter Susan, 39 yr old (lives with her); susan's son who 15 yr old also lives in home     Patient Active Problem List   Diagnosis    CVA (cerebral vascular accident)    History of stroke    Vitamin D deficiency    Hypothyroidism (acquired)    Stroke    Insomnia    Hyperlipidemia    Hypertension    Heart disease    Uncontrolled type 2 diabetes mellitus with hyperglycemia, with long-term current use of insulin    Depression    Arthritis    Type II diabetes mellitus with peripheral circulatory disorder    Class 2 severe obesity due to excess calories with serious comorbidity and body mass index (BMI) of 38.0 to 38.9 in adult    Major depression in remission    Hemiparesis affecting left side as late effect of cerebrovascular accident    Dementia without behavioral  disturbance    BMI 36.0-36.9,adult    Depression, major, recurrent, moderate    Anxiety          Current Outpatient Medications:     aspirin (ECOTRIN) 81 MG EC tablet, Take 1 tablet (81 mg total) by mouth once daily., Disp: 90 tablet, Rfl: 1    clopidogreL (PLAVIX) 75 mg tablet, Take 75 mg by mouth once daily., Disp: , Rfl:     diltiaZEM (DILACOR XR) 240 MG CDCR, Take 240 mg by mouth once daily., Disp: , Rfl:     donepeziL (ARICEPT) 5 MG tablet, Take 1 tablet (5 mg total) by mouth every evening., Disp: 30 tablet, Rfl: 11    dulaglutide (TRULICITY) 4.5 mg/0.5 mL pen injector, Inject 4.5 mg into the skin every 7 days., Disp: 4 pen, Rfl: 6    EASY TOUCH LANCING DEVICE Misc, , Disp: , Rfl:     fluticasone propionate (FLONASE) 50 mcg/actuation nasal spray, 2 SPRAYS IN EACH NOSTRIL EVERY DAY AS NEEDED, Disp: 16 g, Rfl: 1    gabapentin (NEURONTIN) 100 MG capsule, Take 100 mg by mouth., Disp: , Rfl:     glipiZIDE (GLUCOTROL) 10 MG tablet, TAKE ONE TABLET BY MOUTH TWO TIMES A DAY BEFORE MEALS, Disp: 60 tablet, Rfl: 5    hydroCHLOROthiazide (HYDRODIURIL) 25 MG tablet, TAKE 1 TABLET (25 MG TOTAL) BY MOUTH ONCE DAILY., Disp: 90 tablet, Rfl: 3    HYDROcodone-acetaminophen (NORCO)  mg per tablet, , Disp: , Rfl: 0    irbesartan (AVAPRO) 300 MG tablet, TAKE 1 TABLET (300 MG TOTAL) BY MOUTH EVERY EVENING., Disp: 90 tablet, Rfl: 3    lamoTRIgine (LAMICTAL) 25 MG tablet, Take 1 tablet (25 mg total) by mouth 2 (two) times daily., Disp: 60 tablet, Rfl: 11    lancing device with lancets Kit, 1 each by Misc.(Non-Drug; Combo Route) route 3 (three) times daily., Disp: 200 each, Rfl: 10    LANTUS SOLOSTAR U-100 INSULIN glargine 100 units/mL SubQ pen, Inject 66 Units into the skin once daily., Disp: 30 mL, Rfl: 5    levothyroxine (SYNTHROID) 125 MCG tablet, Take 1 tablet (125 mcg total) by mouth before breakfast., Disp: 30 tablet, Rfl: 2    linaCLOtide (LINZESS) 290 mcg Cap capsule, Take 1 capsule (290 mcg total) by mouth once daily.,  "Disp: 30 capsule, Rfl: 11    memantine (NAMENDA) 10 MG Tab, Take 1 tablet (10 mg total) by mouth 2 (two) times daily., Disp: 180 tablet, Rfl: 1    metFORMIN (GLUCOPHAGE-XR) 500 MG ER 24hr tablet, TAKE 2 TABLETS (1,000 MG TOTAL) BY MOUTH 2 (TWO) TIMES DAILY WITH MEALS., Disp: 360 tablet, Rfl: 1    metoprolol succinate (TOPROL-XL) 100 MG 24 hr tablet, Take 100 mg by mouth., Disp: , Rfl:     pen needle, diabetic (SURE COMFORT PEN NEEDLE) 32 gauge x 5/32" Ndle, TEST THREE TIMES A DAY, Disp: 100 each, Rfl: 0    pioglitazone (ACTOS) 15 MG tablet, Take 15 mg by mouth every morning., Disp: , Rfl:     polyethylene glycol (GLYCOLAX) 17 gram/dose powder, Take 17 g by mouth once daily., Disp: 595 g, Rfl: 6    rosuvastatin (CRESTOR) 10 MG tablet, Take 1 tablet (10 mg total) by mouth nightly., Disp: 90 tablet, Rfl: 1    sertraline (ZOLOFT) 100 MG tablet, Take 1.5 tablets (150 mg total) by mouth once daily., Disp: 135 tablet, Rfl: 1    TRUE METRIX GLUCOSE TEST STRIP Strp, USE 3 TIMES DAILY, Disp: 100 each, Rfl: 0    zolpidem (AMBIEN) 5 MG Tab, Take 1 tablet (5 mg total) by mouth nightly as needed (INSOMNIA)., Disp: 30 tablet, Rfl: 3     Social History     Tobacco Use   Smoking Status Never   Smokeless Tobacco Never        Review of patient's allergies indicates:  No Known Allergies     ASSESSMENT:   Encounter Diagnoses   Name Primary?    Depression, major, recurrent, moderate     Hemiparesis affecting left side as late effect of cerebrovascular accident Yes    History of stroke (pt reports CVA about 2010)     Type II diabetes mellitus with peripheral circulatory disorder     Anxiety          Patient Instructions       Psyc MD flowed request to scheduling for  sept 21 2023 9:30a Telehealth O PATIENT  NEEDS TO CONFIRM APPOINTMENT  by seeing such appointment  appear on their "My Chart" jessie.      NOTE:  IF  appointment is not visible, THEN patient is  instructed to call [facility] ([facility]) at:  " [phone]  and coordinate appointment scheduling with . Understanding Expressed.      AFTER Sept appt/ She is scheduled for IN CLINIC appt  for Dec 12 2023 as below:    12/12/2023  8:30 AM NEW PATIENT - PSYCHIATRY ([facility]) [facility] Shahbaz Rodriguez MD    Location Instructions:    3rd Floor       Meds: as per [place] [facility] Monitor: Dr VINNY John has been writing for her Ambien / Dr John is also writing for her pain meds ; so makes sense to defer to him to write her ambien as per [place] [facility] / If any issues with that / contact [facility]    This Psyc MD did confirm that she DOES have Ambien as was filled last Leslee 10  2023    Also this psyc MD did place order for zoloft 100 mg tab (1 and 1/2 tab daily) #135  / 3 month supply with refill to cover until Dr Rodriguez    References:     Relaxation stress reduction workbook: MANI Nelson PhD ( used: $7-10)    Feeling Good Website: Mario Alberto Broussard MD / [email or web address] website (free) / kerri. PODCASTS    Anxiety &  phobia workbook by FRANK Vazquez PhD  (web retailers: used: $ 7-10)    [place]: Path to Better Sleep : https://www.veterantraining.[place]gov[place]insomnia/ (free)       Pt expressed appreciation for the visit today and did not have further question at this time though pt  was still informed to:     Call  if problems.    Call / Report Side Effects to Psyc MD     Encouraged to follow up with primary care / Gen Med MD for continued monitoring of general health and wellness.    Understanding was expressed; and no further concerns nor questions were raised at this time.     Remember healthy self care:   eat right  attempt adequate rest   HANDWASHING / encourage such kerri. During this corona virus time   walk or light exercise within reason and as your general med team approves  read or explore any of reference materials / homework mentioned  reach out (I.e.,  connect with)  others who nuture and bring out best in you  avoid risky behaviors    Keep your  appointments:    IF you  cannot make your appt THEN please call [phone] or go online (via My Chart jessie) to reschedule.    It is the responsibility of the patient to reschedule an appointment if an appointment has been canceled or missed.    Avoid  alcohol and illicit substances.  Look for the positive.  All is often relative-seek balance  Call sooner if needed : [phone]   Call 911 or go to Emergency Room  (ER)  if  any acute concerns

## 2023-06-27 NOTE — PATIENT INSTRUCTIONS
"    Xochitl CAMPO flowed request to scheduling for  sept 21 2023 9:30a Telehealth THO PATIENT  NEEDS TO CONFIRM APPOINTMENT  by seeing such appointment  appear on their "My Chart" jessie.      NOTE:  IF  appointment is not visible, THEN patient is  instructed to call [facility] ([facility]) at:  [phone]  and coordinate appointment scheduling with . Understanding Expressed.      AFTER Sept appt/ She is scheduled for IN CLINIC appt  for Dec 12 2023 as below:    12/12/2023  8:30 AM NEW PATIENT - PSYCHIATRY ([facility]) [facility] Shahbaz Rodriguez MD    Location Instructions:    3rd Floor       Meds: as per [place] [facility]: Dr VINNY John has been writing for her Ambien / Dr John is also writing for her pain meds ; so makes sense to defer to him to write her ambien as per [place] [facility] / If any issues with that / contact [facility]    This Psmaria dolores CAMPO did confirm that she DOES have Ambien as was filled last Leslee 10  2023    Also this xochitl CAMPO did place order for zoloft 100 mg tab (1 and 1/2 tab daily) #135  / 3 month supply with refill to cover until Dr Rodriguez    References:     Relaxation stress reduction workbook: MANI Nelson PhD ( used: $7-10)    Feeling Good Website: Mario Alberto Broussard MD / [email or web address] website (free) / kerri. PODCASTS    Anxiety &  phobia workbook by FRANK Vazquez PhD  (web retailers: used: $ 7-10)    [place]: Path to Better Sleep : https://www.veterantraining.[place]insomnia/ (free)       Pt expressed appreciation for the visit today and did not have further question at this time though pt  was still informed to:     Call  if problems.    Call / Report Side Effects to Xochitl CAMPO     Encouraged to follow up with primary care / Gen Med MD for continued monitoring of general health and wellness.    Understanding was expressed; and no further concerns nor questions were raised at this time.     Remember healthy self care:   eat right  attempt adequate rest   HANDWASHING / encourage " such kerri. During this corona virus time   walk or light exercise within reason and as your general med team approves  read or explore any of reference materials / homework mentioned  reach out (I.e.,  connect with)  others who nuture and bring out best in you  avoid risky behaviors    Keep your appointments:    IF you  cannot make your appt THEN please call [phone] or go online (via My Chart jessie) to reschedule.    It is the responsibility of the patient to reschedule an appointment if an appointment has been canceled or missed.    Avoid  alcohol and illicit substances.  Look for the positive.  All is often relative-seek balance  Call sooner if needed : [phone]   Call 911 or go to Emergency Room  (ER)  if  any acute concerns

## 2023-07-03 ENCOUNTER — TELEPHONE (OUTPATIENT)
Dept: INTERNAL MEDICINE | Facility: CLINIC | Age: 68
End: 2023-07-03
Payer: MEDICARE

## 2023-07-03 DIAGNOSIS — Z12.31 SCREENING MAMMOGRAM FOR BREAST CANCER: Primary | ICD-10-CM

## 2023-07-03 RX ORDER — PEN NEEDLE, DIABETIC 30 GX3/16"
NEEDLE, DISPOSABLE MISCELLANEOUS
Qty: 100 EACH | Refills: 3 | Status: SHIPPED | OUTPATIENT
Start: 2023-07-03

## 2023-07-03 NOTE — TELEPHONE ENCOUNTER
Spoke to pts daughter as pt has dementia.  Educated pts daughter on automatically generated letters related to annual mammo.  Pt scheduled for annual mammo.

## 2023-07-11 ENCOUNTER — TELEPHONE (OUTPATIENT)
Dept: INTERNAL MEDICINE | Facility: CLINIC | Age: 68
End: 2023-07-11
Payer: MEDICARE

## 2023-07-11 ENCOUNTER — PATIENT OUTREACH (OUTPATIENT)
Dept: ADMINISTRATIVE | Facility: HOSPITAL | Age: 68
End: 2023-07-11
Payer: MEDICARE

## 2023-07-11 DIAGNOSIS — Z12.11 ENCOUNTER FOR COLORECTAL CANCER SCREENING: ICD-10-CM

## 2023-07-11 DIAGNOSIS — R19.5 POSITIVE COLORECTAL CANCER SCREENING USING COLOGUARD TEST: ICD-10-CM

## 2023-07-11 DIAGNOSIS — Z12.11 COLON CANCER SCREENING: Primary | ICD-10-CM

## 2023-07-11 DIAGNOSIS — Z12.12 ENCOUNTER FOR COLORECTAL CANCER SCREENING: ICD-10-CM

## 2023-07-11 NOTE — TELEPHONE ENCOUNTER
----- Message from Radha Hebert LPN sent at 7/11/2023  9:15 AM CDT -----  Contact: Kamille/ Daughter  That was from 2013, so 10 years, would be due again  ----- Message -----  From: Katy Hickey LPN  Sent: 7/11/2023   9:10 AM CDT  To: Radha Hebert LPN    No I saw a note that you had a call into Dr Solis's office was not sure if they responded   ----- Message -----  From: Radha Hebert LPN  Sent: 7/11/2023   8:29 AM CDT  To: Katy Hcikey LPN    Did Dr Cook not want to order this for some reason?  ----- Message -----  From: Katy Hickey LPN  Sent: 7/10/2023   4:09 PM CDT  To: Radha Hebert LPN      ----- Message -----  From: Amaya Blake  Sent: 7/10/2023   3:44 PM CDT  To: Joey Simental is calling in regards to getting orders for patient colonoscopy. Please call her back at [phone]. Thanks KB

## 2023-07-11 NOTE — PROGRESS NOTES
Received mess that pt would like to do colon.  PAT ordered.    Spoke with Ms Simental regarding order for colon, advised her someone would contact her to schedule appt.   She then told me someone in Dr Cook office said she is not due until 2025. I have tried to find a more recent colon than 2013 and have been unsuccessful, asked, where colon was completed and she did not know. Will ask for assist from Dr Cook staff.

## 2023-07-11 NOTE — TELEPHONE ENCOUNTER
----- Message from Katy Hickey LPN sent at 7/10/2023  4:09 PM CDT -----  Contact: Kamille/ Daughter    ----- Message -----  From: Amaya Blake  Sent: 7/10/2023   3:44 PM CDT  To: Joey Muñoz Staff    Kamille is calling in regards to getting orders for patient colonoscopy. Please call her back at [phone]. Thanks KB

## 2023-07-11 NOTE — Clinical Note
She then told me someone in Dr Cook office said she is not due until 2025. I have tried to find a more recent colon than 2013 and have been unsuccessful, asked, where colon was completed and she did not know. Will ask for assist from Dr Cook staff. Where is the colon that says she si not due until 2025?

## 2023-07-12 ENCOUNTER — HOSPITAL ENCOUNTER (OUTPATIENT)
Dept: RADIOLOGY | Facility: HOSPITAL | Age: 68
Discharge: HOME | End: 2023-07-12
Attending: INTERNAL MEDICINE
Payer: MEDICARE

## 2023-07-12 DIAGNOSIS — Z12.31 SCREENING MAMMOGRAM FOR BREAST CANCER: ICD-10-CM

## 2023-07-12 PROCEDURE — 77067 SCR MAMMO BI INCL CAD: CPT | Mod: 26,,, | Performed by: RADIOLOGY

## 2023-07-12 PROCEDURE — 77067 MAMMO DIGITAL SCREENING BILAT WITH TOMO: ICD-10-PCS | Mod: 26,,, | Performed by: RADIOLOGY

## 2023-07-12 PROCEDURE — 77063 MAMMO DIGITAL SCREENING BILAT WITH TOMO: ICD-10-PCS | Mod: 26,,, | Performed by: RADIOLOGY

## 2023-07-12 PROCEDURE — 77067 SCR MAMMO BI INCL CAD: CPT | Mod: TC

## 2023-07-12 PROCEDURE — 77063 BREAST TOMOSYNTHESIS BI: CPT | Mod: 26,,, | Performed by: RADIOLOGY

## 2023-07-13 ENCOUNTER — HOSPITAL ENCOUNTER (OUTPATIENT)
Dept: PREADMISSION TESTING | Facility: HOSPITAL | Age: 68
Discharge: HOME | End: 2023-07-13
Attending: INTERNAL MEDICINE
Payer: MEDICARE

## 2023-07-13 DIAGNOSIS — Z12.12 ENCOUNTER FOR COLORECTAL CANCER SCREENING: ICD-10-CM

## 2023-07-13 DIAGNOSIS — Z12.11 ENCOUNTER FOR COLORECTAL CANCER SCREENING: ICD-10-CM

## 2023-07-21 ENCOUNTER — TELEPHONE (OUTPATIENT)
Dept: PREADMISSION TESTING | Facility: HOSPITAL | Age: 68
End: 2023-07-21
Payer: MEDICARE

## 2023-07-27 DIAGNOSIS — E03.9 HYPOTHYROIDISM, UNSPECIFIED TYPE: ICD-10-CM

## 2023-07-27 RX ORDER — LEVOTHYROXINE SODIUM 125 UG/1
125 TABLET ORAL
Qty: 90 TABLET | Refills: 2 | Status: SHIPPED | OUTPATIENT
Start: 2023-07-27

## 2023-07-27 NOTE — TELEPHONE ENCOUNTER
No care due was identified.  [facility] Embedded Care Due Messages. Reference number: [id number].   7/27/2023 5:36:28 PM CDT

## 2023-07-28 ENCOUNTER — TELEPHONE (OUTPATIENT)
Dept: PREADMISSION TESTING | Facility: HOSPITAL | Age: 68
End: 2023-07-28
Payer: MEDICARE

## 2023-07-28 DIAGNOSIS — Z12.11 ENCOUNTER FOR SCREENING COLONOSCOPY: Primary | ICD-10-CM

## 2023-07-28 NOTE — TELEPHONE ENCOUNTER
Refill Decision Note   Krysta Manuel  is requesting a refill authorization.  Brief Assessment and Rationale for Refill:  Approve     Medication Therapy Plan:         Comments:     Note composed:9:35 PM 07/27/2023

## 2023-08-12 ENCOUNTER — NURSE TRIAGE (OUTPATIENT)
Dept: ADMINISTRATIVE | Facility: CLINIC | Age: 68
End: 2023-08-12
Payer: MEDICARE

## 2023-08-12 NOTE — TELEPHONE ENCOUNTER
Pt calling asking for her arrival time for her colonoscopy for Tuesday. Advised she will receive call on Monday to tell her the arrival time. verbalized understanding. Denies any further questions or concerns at this time, advised to call back if they have any that come up. Advised pt to call back with any other concerns or worsening symptoms. Verbalized understanding and will route message to provider.     Reason for Disposition   Health Information question, no triage required and triager able to answer question    Protocols used: Information Only Call - No Triage-A-

## 2023-08-15 ENCOUNTER — ANESTHESIA (OUTPATIENT)
Dept: ENDOSCOPY | Facility: HOSPITAL | Age: 68
End: 2023-08-15
Payer: MEDICARE

## 2023-08-15 ENCOUNTER — ANESTHESIA EVENT (OUTPATIENT)
Dept: ENDOSCOPY | Facility: HOSPITAL | Age: 68
End: 2023-08-15
Payer: MEDICARE

## 2023-08-15 ENCOUNTER — HOSPITAL ENCOUNTER (OUTPATIENT)
Facility: HOSPITAL | Age: 68
Discharge: HOME | End: 2023-08-15
Attending: INTERNAL MEDICINE | Admitting: INTERNAL MEDICINE
Payer: MEDICARE

## 2023-08-15 VITALS
OXYGEN SATURATION: 96 % | RESPIRATION RATE: 16 BRPM | HEIGHT: 63 IN | WEIGHT: 205 LBS | HEART RATE: 81 BPM | SYSTOLIC BLOOD PRESSURE: 160 MMHG | BODY MASS INDEX: 36.32 KG/M2 | TEMPERATURE: 98 F | DIASTOLIC BLOOD PRESSURE: 77 MMHG

## 2023-08-15 DIAGNOSIS — Z12.11 COLON CANCER SCREENING: ICD-10-CM

## 2023-08-15 LAB — POCT GLUCOSE: 156 MG/DL (ref 70–110)

## 2023-08-15 PROCEDURE — 88305 TISSUE EXAM BY PATHOLOGIST: CPT | Mod: 26,,, | Performed by: STUDENT IN AN ORGANIZED HEALTH CARE EDUCATION/TRAINING PROGRAM

## 2023-08-15 PROCEDURE — 45385 PR COLONOSCOPY,REMV LESN,SNARE: ICD-10-PCS | Mod: PT,,, | Performed by: INTERNAL MEDICINE

## 2023-08-15 PROCEDURE — 37000009 HC ANESTHESIA EA ADD 15 MINS: Performed by: INTERNAL MEDICINE

## 2023-08-15 PROCEDURE — 63600175 PHARM REV CODE 636 W HCPCS

## 2023-08-15 PROCEDURE — 88305 TISSUE EXAM BY PATHOLOGIST: ICD-10-PCS | Mod: 26,,, | Performed by: STUDENT IN AN ORGANIZED HEALTH CARE EDUCATION/TRAINING PROGRAM

## 2023-08-15 PROCEDURE — 45385 COLONOSCOPY W/LESION REMOVAL: CPT | Mod: PT,,, | Performed by: INTERNAL MEDICINE

## 2023-08-15 PROCEDURE — 45380 COLONOSCOPY AND BIOPSY: CPT | Mod: PT,59,, | Performed by: INTERNAL MEDICINE

## 2023-08-15 PROCEDURE — 45380 PR COLONOSCOPY,BIOPSY: ICD-10-PCS | Mod: PT,59,, | Performed by: INTERNAL MEDICINE

## 2023-08-15 PROCEDURE — 45385 COLONOSCOPY W/LESION REMOVAL: CPT | Mod: PT | Performed by: INTERNAL MEDICINE

## 2023-08-15 PROCEDURE — 37000008 HC ANESTHESIA 1ST 15 MINUTES: Performed by: INTERNAL MEDICINE

## 2023-08-15 PROCEDURE — 45380 COLONOSCOPY AND BIOPSY: CPT | Mod: PT,59 | Performed by: INTERNAL MEDICINE

## 2023-08-15 PROCEDURE — 88305 TISSUE EXAM BY PATHOLOGIST: CPT | Performed by: STUDENT IN AN ORGANIZED HEALTH CARE EDUCATION/TRAINING PROGRAM

## 2023-08-15 PROCEDURE — 82962 GLUCOSE BLOOD TEST: CPT | Performed by: INTERNAL MEDICINE

## 2023-08-15 PROCEDURE — 27201089 HC SNARE, DISP (ANY): Performed by: INTERNAL MEDICINE

## 2023-08-15 PROCEDURE — 27201012 HC FORCEPS, HOT/COLD, DISP: Performed by: INTERNAL MEDICINE

## 2023-08-15 PROCEDURE — 25000003 PHARM REV CODE 250

## 2023-08-15 RX ORDER — PROPOFOL 10 MG/ML
VIAL (ML) INTRAVENOUS
Status: DISCONTINUED | OUTPATIENT
Start: 2023-08-15 | End: 2023-08-15

## 2023-08-15 RX ORDER — SODIUM CHLORIDE 9 MG/ML
INJECTION, SOLUTION INTRAVENOUS CONTINUOUS
Status: DISCONTINUED | OUTPATIENT
Start: 2023-08-15 | End: 2023-08-15 | Stop reason: HOSPADM

## 2023-08-15 RX ORDER — LIDOCAINE HYDROCHLORIDE 10 MG/ML
INJECTION, SOLUTION EPIDURAL; INFILTRATION; INTRACAUDAL; PERINEURAL
Status: DISCONTINUED | OUTPATIENT
Start: 2023-08-15 | End: 2023-08-15

## 2023-08-15 RX ORDER — SODIUM CHLORIDE, SODIUM LACTATE, POTASSIUM CHLORIDE, CALCIUM CHLORIDE 600; 310; 30; 20 MG/100ML; MG/100ML; MG/100ML; MG/100ML
INJECTION, SOLUTION INTRAVENOUS CONTINUOUS PRN
Status: DISCONTINUED | OUTPATIENT
Start: 2023-08-15 | End: 2023-08-15

## 2023-08-15 RX ADMIN — PROPOFOL 80 MG: 10 INJECTION, EMULSION INTRAVENOUS at 10:08

## 2023-08-15 RX ADMIN — PROPOFOL 20 MG: 10 INJECTION, EMULSION INTRAVENOUS at 10:08

## 2023-08-15 RX ADMIN — PROPOFOL 30 MG: 10 INJECTION, EMULSION INTRAVENOUS at 10:08

## 2023-08-15 RX ADMIN — PROPOFOL 40 MG: 10 INJECTION, EMULSION INTRAVENOUS at 10:08

## 2023-08-15 RX ADMIN — SODIUM CHLORIDE, SODIUM LACTATE, POTASSIUM CHLORIDE, AND CALCIUM CHLORIDE: 600; 310; 30; 20 INJECTION, SOLUTION INTRAVENOUS at 10:08

## 2023-08-15 RX ADMIN — LIDOCAINE HYDROCHLORIDE 50 MG: 10 SOLUTION INTRAVENOUS at 10:08

## 2023-08-15 NOTE — TRANSFER OF CARE
"Anesthesia Transfer of Care Note    Patient: Krysta Manuel    Procedure(s) Performed: Procedure(s) (LRB):  COLONOSCOPY (N/A)    Patient location: PACU    Anesthesia Type: MAC    Transport from OR: Transported from OR on room air with adequate spontaneous ventilation    Post pain: adequate analgesia    Post assessment: no apparent anesthetic complications    Post vital signs: stable    Level of consciousness: awake    Nausea/Vomiting: no nausea/vomiting    Complications: none    Transfer of care protocol was followed      Last vitals:   Visit Vitals  BP (!) 160/77 (BP Location: Left arm, Patient Position: Sitting)   Pulse 81   Temp 36.6 °C (97.8 °F)   Resp 16   Ht 5' 3" (1.6 m)   Wt 93 kg (205 lb)   SpO2 96%   Breastfeeding No   BMI 36.31 kg/m²     "

## 2023-08-15 NOTE — ANESTHESIA PREPROCEDURE EVALUATION
08/15/2023  Krysta Manuel is a 68 y.o., female.      Pre-op Assessment    I have reviewed the Patient Summary Reports.     I have reviewed the Nursing Notes. I have reviewed the NPO Status.   I have reviewed the Medications.     Review of Systems  Anesthesia Hx:  Denies Family Hx of Anesthesia complications.   Denies Personal Hx of Anesthesia complications.   Social:  No Alcohol Use    Cardiovascular:   Hypertension Denies MI.  Denies CAD.     Denies CHF.    Pulmonary:  Pulmonary Normal    Hepatic/GI:  Hepatic/GI Normal Bowel Prep.    Neurological:   CVA    Endocrine:   Diabetes Hypothyroidism    Psych:   Psychiatric History          Physical Exam  General: Cooperative and Alert    Airway:  Mallampati: II   Mouth Opening: Normal  TM Distance: Normal  Tongue: Normal  Neck ROM: Normal ROM    Dental:  Intact      Lab Results   Component Value Date    WBC 8.69 02/13/2023    HGB 10.9 (L) 02/13/2023    HCT 36.3 (L) 02/13/2023    MCV 87 02/13/2023     02/13/2023       Chemistry        Component Value Date/Time     02/13/2023 1015    K 4.2 02/13/2023 1015     02/13/2023 1015    CO2 25 02/13/2023 1015    BUN 14 02/13/2023 1015    CREATININE 0.9 02/13/2023 1015    GLU 95 02/13/2023 1015        Component Value Date/Time    CALCIUM 10.1 02/13/2023 1015    ALKPHOS 70 02/13/2023 1015    AST 15 02/13/2023 1015    ALT 18 02/13/2023 1015    BILITOT 0.4 02/13/2023 1015    ESTGFRAFRICA >60.0 07/18/2022 0937    EGFRNONAA 58.0 (A) 07/18/2022 0937        Results for orders placed or performed during the hospital encounter of 05/11/23   EKG 12-lead    Collection Time: 05/11/23 11:52 AM    Narrative    Test Reason : F03.90,Z86.73,I69.354,F32.9,E03.9,I10,I51.9,F32.5,Z68.36,E11.51~    Vent. Rate : 081 BPM     Atrial Rate : 081 BPM     P-R Int : 186 ms          QRS Dur : 084 ms      QT Int : 396 ms       P-R-T  Axes : 047 008 052 degrees     QTc Int : 460 ms    Normal sinus rhythm  Normal ECG  No previous ECGs available  Confirmed by GAEL CHEUNG MD ([id number]) on 5/11/2023 1:54:51 PM    Referred By: SANDEEP MCBRIDE           Confirmed By:GAEL CHEUNG MD           Anesthesia Plan  Type of Anesthesia, risks & benefits discussed:    Anesthesia Type: MAC, Gen Natural Airway  Intra-op Monitoring Plan: Standard ASA Monitors  Induction:  IV  Informed Consent: Informed consent signed with the Patient and all parties understand the risks and agree with anesthesia plan.  All questions answered.   ASA Score: 2  Day of Surgery Review of History & Physical: H&P Update referred to the surgeon/provider.    Ready For Surgery From Anesthesia Perspective.     .

## 2023-08-15 NOTE — PROVATION PATIENT INSTRUCTIONS
Discharge Summary/Instructions after an Endoscopic Procedure  Patient Name: Krysta Manuel  Patient MRN: [id number]  Patient YOB: 1955  Tuesday, August 15, 2023 Fina Vieyra MD  Dear patient,  As a result of recent federal legislation (The Federal Cures Act), you may   receive lab or pathology results from your procedure in your [facility]   account before your physician is able to contact you. Your physician or   their representative will relay the results to you with their   recommendations at their soonest availability.  Thank you,  RESTRICTIONS:  During your procedure today, you received medications for sedation.  These   medications may affect your judgment, balance and coordination.  Therefore,   for 24 hours, you have the following restrictions:   - DO NOT drive a car, operate machinery, make legal/financial decisions,   sign important papers or drink alcohol.    ACTIVITY:  Today: no heavy lifting, straining or running due to procedural   sedation/anesthesia.  The following day: return to full activity including work.  DIET:  Eat and drink normally unless instructed otherwise.     TREATMENT FOR COMMON SIDE EFFECTS:  - Mild abdominal pain, nausea, belching, bloating or excessive gas:  rest,   eat lightly and use a heating pad.  - Sore Throat: treat with throat lozenges and/or gargle with warm salt   water.  - Because air was used during the procedure, expelling large amounts of air   from your rectum or belching is normal.  - If a bowel prep was taken, you may not have a bowel movement for 1-3 days.    This is normal.  SYMPTOMS TO WATCH FOR AND REPORT TO YOUR PHYSICIAN:  1. Abdominal pain or bloating, other than gas cramps.  2. Chest pain.  3. Back pain.  4. Signs of infection such as: chills or fever occurring within 24 hours   after the procedure.  5. Rectal bleeding, which would show as bright red, maroon, or black stools.   (A tablespoon of blood from the rectum is not serious, especially  if   hemorrhoids are present.)  6. Vomiting.  7. Weakness or dizziness.  GO DIRECTLY TO THE NEAREST EMERGENCY ROOM IF YOU HAVE ANY OF THE FOLLOWING:      Difficulty breathing              Chills and/or fever over 101 F   Persistent vomiting and/or vomiting blood   Severe abdominal pain   Severe chest pain   Black, tarry stools   Bleeding- more than one tablespoon   Any other symptom or condition that you feel may need urgent attention  Your doctor recommends these additional instructions:  If any biopsies were taken, your doctors clinic will contact you in 1 to 2   weeks with any results.  - Patient has a contact number available for emergencies.  The signs and   symptoms of potential delayed complications were discussed with the   patient.  Return to normal activities tomorrow.  Written discharge   instructions were provided to the patient.   - Discharge patient to home (via wheelchair).   - Resume previous diet today.   - Continue present medications.   - No aspirin, ibuprofen, naproxen, or other non-steroidal anti-inflammatory   drugs for 7 days after polyp removal.   - Resume Plavix (clopidogrel) at prior dose tomorrow.   - Await pathology results.   - Repeat colonoscopy in 3 years for surveillance, depending on overall   health at that time.  Should have discussion regarding risks and benefits   in the GI clinic prior to the procedure being scheduled.  For questions, problems or results please call your physician Fina Vieyra MD at Work:  [phone]  If you have any questions about the above instructions, call the GI   department at [phone] or call the endoscopy unit at [phone]   from 7am until 3 pm.  [facility] [place], EMERGENCY ROOM PHONE NUMBER:   [phone]  IF A COMPLICATION OR EMERGENCY SITUATION ARISES AND YOU ARE UNABLE TO REACH   YOUR PHYSICIAN - GO DIRECTLY TO THE EMERGENCY ROOM.  I have read or have had read to me these discharge instructions for my    procedure and have received a written copy.  I understand these   instructions and will follow-up with my physician if I have any questions.     __________________________________       _____________________________________  Nurse Signature                                          Patient/Designated   Responsible Party Signature  MD Fina Fierro MD  8/15/2023 11:07:19 AM  This report has been verified and signed electronically.  Dear patient,  As a result of recent federal legislation (The Federal Cures Act), you may   receive lab or pathology results from your procedure in your [facility]   account before your physician is able to contact you. Your physician or   their representative will relay the results to you with their   recommendations at their soonest availability.  Thank you,  PR[employer]ATION

## 2023-08-15 NOTE — PLAN OF CARE
MD Azalia at bedside speaking with pt/family about procedure at this time. Questions answered. No complaints noted per pt.

## 2023-08-15 NOTE — ANESTHESIA POSTPROCEDURE EVALUATION
Anesthesia Post Evaluation    Patient: Krysta Manuel    Procedure(s) Performed: Procedure(s) (LRB):  COLONOSCOPY (N/A)    Final Anesthesia Type: MAC      Patient location during evaluation: GI PACU  Patient participation: Yes- Able to Participate  Level of consciousness: awake  Post-procedure vital signs: reviewed and stable  Pain management: adequate  Airway patency: patent    PONV status at discharge: No PONV  Anesthetic complications: no      Cardiovascular status: blood pressure returned to baseline and hemodynamically stable  Respiratory status: unassisted and spontaneous ventilation  Hydration status: euvolemic  Follow-up not needed.          Vitals Value Taken Time   /77 08/15/23 1116   Temp 36.6 °C (97.8 °F) 08/15/23 1106   Pulse 81 08/15/23 1116   Resp 16 08/15/23 1116   SpO2 96 % 08/15/23 1116         No case tracking events are documented in the log.      Pain/Katie Score: Katie Score: 10 (8/15/2023 11:16 AM)

## 2023-08-15 NOTE — H&P
PRE PROCEDURE H&P    Patient Name: Krysta Manuel  MRN: [id number]  : 1955  Date of Procedure:  8/15/2023  Referring Physician: Wanda Cook DO  Primary Physician: Wanda Cook DO  Procedure Physician: Fina Vieyra MD       Planned Procedure: Colonoscopy  Diagnosis: screening for colon cancer  Chief Complaint: Same as above    HPI: Patient is an 68 y.o. female is here for the above.       Past Medical History:   Past Medical History:   Diagnosis Date    Arthritis     Carotid artery disease     Dr. Jessa Szymanski--cardiology    Depression     DM II (diabetes mellitus, type II), controlled 12 years    Heart disease     Dr. Jessa Szymanski--cardiology    HTN (hypertension)     Hyperlipidemia     Hypothyroidism     Insomnia     Stroke     Dr. Jessa Szymanski--cardiology    Vitamin D deficiency         Past Surgical History:  Past Surgical History:   Procedure Laterality Date    CHOLECYSTECTOMY      COLONOSCOPY      HYSTERECTOMY      THYROIDECTOMY      TOTAL ABDOMINAL HYSTERECTOMY W/ BILATERAL SALPINGOOPHORECTOMY          Home Medications:  Prior to Admission medications    Medication Sig Start Date End Date Taking? Authorizing Provider   aspirin (ECOTRIN) 81 MG EC tablet Take 1 tablet (81 mg total) by mouth once daily. 22  Yes Wanda Cook DO   clopidogreL (PLAVIX) 75 mg tablet Take 75 mg by mouth once daily. 1/3/22  Yes Provider, Historical   diltiaZEM (DILACOR XR) 240 MG CDCR Take 240 mg by mouth once daily.   Yes Provider, Historical   donepeziL (ARICEPT) 5 MG tablet Take 1 tablet (5 mg total) by mouth every evening. 5/11/23 5/10/24 Yes Kandi Laurent, LEOBARDO   fluticasone propionate (FLONASE) 50 mcg/actuation nasal spray 2 SPRAYS IN EACH NOSTRIL EVERY DAY AS NEEDED 3/15/21  Yes Amandeep Sen MD   gabapentin (NEURONTIN) 100 MG capsule Take 100 mg by mouth. 23  Yes Provider, Historical   glipiZIDE (GLUCOTROL) 10 MG tablet TAKE ONE TABLET BY MOUTH TWO TIMES A DAY BEFORE MEALS 3/9/23  Yes  "Zenaida Shetty NP   hydroCHLOROthiazide (HYDRODIURIL) 25 MG tablet TAKE 1 TABLET (25 MG TOTAL) BY MOUTH ONCE DAILY. 4/7/23  Yes Wanda Cook DO   irbesartan (AVAPRO) 300 MG tablet TAKE 1 TABLET (300 MG TOTAL) BY MOUTH EVERY EVENING. 4/7/23  Yes Wanda Cook DO   levothyroxine (SYNTHROID) 125 MCG tablet TAKE 1 TABLET (125 MCG TOTAL) BY MOUTH BEFORE BREAKFAST. 7/27/23  Yes Wanda Cook DO   linaCLOtide (LINZESS) 290 mcg Cap capsule Take 1 capsule (290 mcg total) by mouth once daily. 2/20/23  Yes Jordan Marinelli MD   memantine (NAMENDA) 10 MG Tab Take 1 tablet (10 mg total) by mouth 2 (two) times daily. 11/16/22  Yes Wanda Cook DO   metFORMIN (GLUCOPHAGE-XR) 500 MG ER 24hr tablet TAKE 2 TABLETS (1,000 MG TOTAL) BY MOUTH 2 (TWO) TIMES DAILY WITH MEALS. 4/19/23  Yes Wanda Cook DO   metoprolol succinate (TOPROL-XL) 100 MG 24 hr tablet Take 100 mg by mouth. 4/18/23  Yes Provider, Historical   pen needle, diabetic (SURE COMFORT PEN NEEDLE) 32 gauge x 5/32" Ndle Use daily with insulin pen. 7/3/23   Zenaida Shetty NP   pioglitazone (ACTOS) 15 MG tablet Take 15 mg by mouth every morning. 4/17/23  Yes Provider, Historical   rosuvastatin (CRESTOR) 10 MG tablet Take 1 tablet (10 mg total) by mouth nightly. 11/16/22  Yes Wanda Cook DO   sertraline (ZOLOFT) 100 MG tablet Take 1.5 tablets (150 mg total) by mouth once daily. 6/27/23 12/24/23 Yes Mario Alberto Pierre MD   dulaglutide (TRULICITY) 4.5 mg/0.5 mL pen injector Inject 4.5 mg into the skin every 7 days. 6/13/23   Zenaida Shetty NP   EASY TOUCH LANCING DEVICE Misc  2/24/21   Provider, Historical   HYDROcodone-acetaminophen (NORCO)  mg per tablet  10/22/19   Provider, Historical   lamoTRIgine (LAMICTAL) 25 MG tablet Take 1 tablet (25 mg total) by mouth 2 (two) times daily. 5/11/23 5/10/24  Kandi Laurent, LEOBARDO   lancing device with lancets Kit 1 each by Misc.(Non-Drug; Combo Route) route 3 (three) times daily. 8/19/20 3/22/23  " "Brown, Amandeep B., MD   LANTUS SOLOSTAR U-100 INSULIN glargine 100 units/mL SubQ pen Inject 66 Units into the skin once daily. 6/12/23   Zenaida Shetty NP   polyethylene glycol (GLYCOLAX) 17 gram/dose powder Take 17 g by mouth once daily. 2/20/23   Jordan Marinelli MD   TRUE METRIX GLUCOSE TEST STRIP Strp USE 3 TIMES DAILY 6/1/23   Sofi Jim PA-C   zolpidem (AMBIEN) 5 MG Tab Take 1 tablet (5 mg total) by mouth nightly as needed (INSOMNIA). 2/10/23 6/10/23  Mario Alberto Pierre MD        Allergies:  Review of patient's allergies indicates:  No Known Allergies     Social History:   Social History     Socioeconomic History    Marital status:    Tobacco Use    Smoking status: Never    Smokeless tobacco: Never   Substance and Sexual Activity    Alcohol use: Not Currently    Drug use: Never       Family History:  Family History   Problem Relation Age of Onset    Hypertension Mother     Diabetes Sister        ROS: No acute cardiac events, no acute respiratory complaints.     Physical Exam (all patients):    BP (!) 184/89 (BP Location: Left arm, Patient Position: Lying)   Pulse 85   Temp 97.5 °F (36.4 °C) (Temporal)   Resp 18   Ht 5' 3" (1.6 m)   Wt 93 kg (205 lb)   SpO2 99%   Breastfeeding No   BMI 36.31 kg/m²   Lungs: Clear to auscultation bilaterally, respirations unlabored  Heart: Regular rate and rhythm, S1 and S2 normal, no obvious murmurs  Abdomen:         Soft, non-tender, bowel sounds normal, no masses, no organomegaly    Lab Results   Component Value Date    WBC 8.69 02/13/2023    MCV 87 02/13/2023    RDW 14.7 (H) 02/13/2023     02/13/2023    GLU 95 02/13/2023    HGBA1C 7.0 (H) 05/15/2023    BUN 14 02/13/2023     02/13/2023    K 4.2 02/13/2023     02/13/2023        SEDATION PLAN: per anesthesia      History reviewed, vital signs satisfactory, cardiopulmonary status satisfactory, sedation options, risks and plans have been discussed with the patient  All their " questions were answered and the patient agrees to the sedation procedures as planned and the patient is deemed an appropriate candidate for the sedation as planned.    Procedure explained to patient, informed consent obtained and placed in chart.    Fina Vieyra  8/15/2023  10:25 AM

## 2023-08-17 ENCOUNTER — OFFICE VISIT (OUTPATIENT)
Dept: INTERNAL MEDICINE | Facility: CLINIC | Age: 68
End: 2023-08-17
Payer: MEDICARE

## 2023-08-17 DIAGNOSIS — R53.1 GENERALIZED WEAKNESS: Primary | ICD-10-CM

## 2023-08-17 DIAGNOSIS — I10 HYPERTENSION GOAL BP (BLOOD PRESSURE) < 130/80: ICD-10-CM

## 2023-08-17 DIAGNOSIS — R53.81 DEBILITY: ICD-10-CM

## 2023-08-17 DIAGNOSIS — I69.354 HEMIPARESIS AFFECTING LEFT SIDE AS LATE EFFECT OF CEREBROVASCULAR ACCIDENT: ICD-10-CM

## 2023-08-17 PROCEDURE — 3051F HG A1C>EQUAL 7.0%<8.0%: CPT | Mod: CPTII,95,, | Performed by: INTERNAL MEDICINE

## 2023-08-17 PROCEDURE — 3051F PR MOST RECENT HEMOGLOBIN A1C LEVEL 7.0 - < 8.0%: ICD-10-PCS | Mod: CPTII,95,, | Performed by: INTERNAL MEDICINE

## 2023-08-17 PROCEDURE — 4010F ACE/ARB THERAPY RXD/TAKEN: CPT | Mod: CPTII,95,, | Performed by: INTERNAL MEDICINE

## 2023-08-17 PROCEDURE — 1160F PR REVIEW ALL MEDS BY PRESCRIBER/CLIN PHARMACIST DOCUMENTED: ICD-10-PCS | Mod: CPTII,95,, | Performed by: INTERNAL MEDICINE

## 2023-08-17 PROCEDURE — 3061F PR NEG MICROALBUMINURIA RESULT DOCUMENTED/REVIEW: ICD-10-PCS | Mod: CPTII,95,, | Performed by: INTERNAL MEDICINE

## 2023-08-17 PROCEDURE — 99499 UNLISTED E&M SERVICE: CPT | Mod: 95,,, | Performed by: INTERNAL MEDICINE

## 2023-08-17 PROCEDURE — 1160F RVW MEDS BY RX/DR IN RCRD: CPT | Mod: CPTII,95,, | Performed by: INTERNAL MEDICINE

## 2023-08-17 PROCEDURE — 1159F MED LIST DOCD IN RCRD: CPT | Mod: CPTII,95,, | Performed by: INTERNAL MEDICINE

## 2023-08-17 PROCEDURE — 3066F PR DOCUMENTATION OF TREATMENT FOR NEPHROPATHY: ICD-10-PCS | Mod: CPTII,95,, | Performed by: INTERNAL MEDICINE

## 2023-08-17 PROCEDURE — 99214 OFFICE O/P EST MOD 30 MIN: CPT | Mod: 95,,, | Performed by: INTERNAL MEDICINE

## 2023-08-17 PROCEDURE — 99214 PR OFFICE/OUTPT VISIT, EST, LEVL IV, 30-39 MIN: ICD-10-PCS | Mod: 95,,, | Performed by: INTERNAL MEDICINE

## 2023-08-17 PROCEDURE — 4010F PR ACE/ARB THEARPY RXD/TAKEN: ICD-10-PCS | Mod: CPTII,95,, | Performed by: INTERNAL MEDICINE

## 2023-08-17 PROCEDURE — 1159F PR MEDICATION LIST DOCUMENTED IN MEDICAL RECORD: ICD-10-PCS | Mod: CPTII,95,, | Performed by: INTERNAL MEDICINE

## 2023-08-17 PROCEDURE — 3066F NEPHROPATHY DOC TX: CPT | Mod: CPTII,95,, | Performed by: INTERNAL MEDICINE

## 2023-08-17 PROCEDURE — 3061F NEG MICROALBUMINURIA REV: CPT | Mod: CPTII,95,, | Performed by: INTERNAL MEDICINE

## 2023-08-17 NOTE — PROGRESS NOTES
Subjective     Patient ID: Krysta Manuel is a 68 y.o. female.    Chief Complaint: Extremity Weakness    The patient location is: [place]   The chief complaint leading to consultation is: weakness     Visit type: audiovisual    Face to Face time with patient: 10 minutes   10 minutes of total time spent on the encounter, which includes face to face time and non-face to face time preparing to see the patient (eg, review of tests), Obtaining and/or reviewing separately obtained history, Documenting clinical information in the electronic or other health record, Independently interpreting results (not separately reported) and communicating results to the patient/family/caregiver, or Care coordination (not separately reported).         Each patient to whom he or she provides medical services by telemedicine is:  (1) informed of the relationship between the physician and patient and the respective role of any other health care provider with respect to management of the patient; and (2) notified that he or she may decline to receive medical services by telemedicine and may withdraw from such care at any time.    Notes:   History mostly from daughter.   Concerned about gradual onset of weakness, getting worse over the past 2 months. Patient is mostly in the bed. When she gets in the chair she is unable to get herself out and requires a lot of assistance. She is weak all over and has baseline left side weakness due to a prior CVA.   She also needs help with her medications. She has uncontrolled HTN.   Her daughter and grandson had been assisting but now just the daughter due to her grandson going back to school.     Extremity Weakness   This is a recurrent problem. The current episode started more than 1 month ago. There has been no history of extremity trauma. The problem occurs constantly. The problem has been gradually worsening. The patient is experiencing no pain. Associated symptoms include a limited range of  motion. The symptoms are aggravated by activity and standing. Her past medical history is significant for diabetes.     Review of Systems   Constitutional:  Positive for activity change. Negative for unexpected weight change.   HENT:  Negative for hearing loss, rhinorrhea and trouble swallowing.    Eyes:  Positive for discharge and visual disturbance.   Respiratory:  Positive for chest tightness. Negative for wheezing.    Cardiovascular:  Positive for palpitations. Negative for chest pain.   Gastrointestinal:  Negative for blood in stool, constipation, diarrhea and vomiting.   Endocrine: Negative for polydipsia and polyuria.   Genitourinary:  Negative for difficulty urinating, dysuria, hematuria and menstrual problem.   Musculoskeletal:  Positive for extremity weakness. Negative for arthralgias, joint swelling and neck pain.   Neurological:  Positive for weakness. Negative for headaches.   Psychiatric/Behavioral:  Positive for confusion and dysphoric mood.           Objective     Physical Exam  Constitutional:       General: She is not in acute distress.     Appearance: She is well-developed. She is not ill-appearing.   Pulmonary:      Effort: Pulmonary effort is normal. No respiratory distress.   Neurological:      Mental Status: She is alert and oriented to person, place, and time.   Psychiatric:         Behavior: Behavior normal.         Thought Content: Thought content normal.         Judgment: Judgment normal.         Krysta was seen today for extremity weakness.    Diagnoses and all orders for this visit:    Generalized weakness  -     Ambulatory referral/consult to Home Health; Future    Hemiparesis affecting left side as late effect of cerebrovascular accident  -     Ambulatory referral/consult to Home Health; Future    Debility  -     Ambulatory referral/consult to Home Health; Future    Hypertension goal BP (blood pressure) < 130/80  -     continue medication  -     b/p monitoring at home

## 2023-08-18 RX ORDER — CALCIUM CITRATE/VITAMIN D3 200MG-6.25
TABLET ORAL
Qty: 100 EACH | Refills: 11 | Status: SHIPPED | OUTPATIENT
Start: 2023-08-18

## 2023-08-20 LAB
FINAL PATHOLOGIC DIAGNOSIS: NORMAL
GROSS: NORMAL
Lab: NORMAL

## 2023-08-22 ENCOUNTER — TELEPHONE (OUTPATIENT)
Dept: INTERNAL MEDICINE | Facility: CLINIC | Age: 68
End: 2023-08-22
Payer: MEDICARE

## 2023-08-22 DIAGNOSIS — R53.1 GENERALIZED WEAKNESS: ICD-10-CM

## 2023-08-22 DIAGNOSIS — R53.81 DEBILITY: ICD-10-CM

## 2023-08-22 DIAGNOSIS — I69.354 HEMIPARESIS AFFECTING LEFT SIDE AS LATE EFFECT OF CEREBROVASCULAR ACCIDENT: Primary | ICD-10-CM

## 2023-08-22 NOTE — TELEPHONE ENCOUNTER
Good Morning,     We received the referral on Krysta Manuel  1955 however we are not in network with [facility] insurance.     Thank you,     Nic[facility]

## 2023-08-23 PROCEDURE — G0180 MD CERTIFICATION HHA PATIENT: HCPCS | Mod: ,,, | Performed by: INTERNAL MEDICINE

## 2023-08-29 ENCOUNTER — OFFICE VISIT (OUTPATIENT)
Dept: NEUROLOGY | Facility: CLINIC | Age: 68
End: 2023-08-29
Payer: MEDICARE

## 2023-08-29 DIAGNOSIS — F32.5 MAJOR DEPRESSION IN REMISSION: ICD-10-CM

## 2023-08-29 DIAGNOSIS — F32.9 MAJOR DEPRESSIVE DISORDER, REMISSION STATUS UNSPECIFIED, UNSPECIFIED WHETHER RECURRENT: ICD-10-CM

## 2023-08-29 DIAGNOSIS — I69.354 HEMIPARESIS AFFECTING LEFT SIDE AS LATE EFFECT OF CEREBROVASCULAR ACCIDENT: ICD-10-CM

## 2023-08-29 DIAGNOSIS — F41.9 ANXIETY: ICD-10-CM

## 2023-08-29 DIAGNOSIS — G47.00 INSOMNIA, UNSPECIFIED TYPE: ICD-10-CM

## 2023-08-29 DIAGNOSIS — F03.90 DEMENTIA WITHOUT BEHAVIORAL DISTURBANCE: ICD-10-CM

## 2023-08-29 DIAGNOSIS — F02.811 MAJOR NEUROCOGNITIVE DISORDER DUE TO MULTIPLE ETIOLOGIES, WITH AGITATION: Primary | ICD-10-CM

## 2023-08-29 DIAGNOSIS — E03.9 HYPOTHYROIDISM (ACQUIRED): ICD-10-CM

## 2023-08-29 DIAGNOSIS — Z86.73 HISTORY OF STROKE: ICD-10-CM

## 2023-08-29 DIAGNOSIS — Z91.89 AT RISK FOR PROLONGED QT INTERVAL SYNDROME: ICD-10-CM

## 2023-08-29 DIAGNOSIS — F33.1 DEPRESSION, MAJOR, RECURRENT, MODERATE: ICD-10-CM

## 2023-08-29 PROCEDURE — 3061F PR NEG MICROALBUMINURIA RESULT DOCUMENTED/REVIEW: ICD-10-PCS | Mod: CPTII,95,, | Performed by: NURSE PRACTITIONER

## 2023-08-29 PROCEDURE — 1159F MED LIST DOCD IN RCRD: CPT | Mod: CPTII,95,, | Performed by: NURSE PRACTITIONER

## 2023-08-29 PROCEDURE — 4010F PR ACE/ARB THEARPY RXD/TAKEN: ICD-10-PCS | Mod: CPTII,95,, | Performed by: NURSE PRACTITIONER

## 2023-08-29 PROCEDURE — 99215 OFFICE O/P EST HI 40 MIN: CPT | Mod: 95,,, | Performed by: NURSE PRACTITIONER

## 2023-08-29 PROCEDURE — 3066F PR DOCUMENTATION OF TREATMENT FOR NEPHROPATHY: ICD-10-PCS | Mod: CPTII,95,, | Performed by: NURSE PRACTITIONER

## 2023-08-29 PROCEDURE — 99215 PR OFFICE/OUTPT VISIT, EST, LEVL V, 40-54 MIN: ICD-10-PCS | Mod: 95,,, | Performed by: NURSE PRACTITIONER

## 2023-08-29 PROCEDURE — 3051F HG A1C>EQUAL 7.0%<8.0%: CPT | Mod: CPTII,95,, | Performed by: NURSE PRACTITIONER

## 2023-08-29 PROCEDURE — 3066F NEPHROPATHY DOC TX: CPT | Mod: CPTII,95,, | Performed by: NURSE PRACTITIONER

## 2023-08-29 PROCEDURE — 3051F PR MOST RECENT HEMOGLOBIN A1C LEVEL 7.0 - < 8.0%: ICD-10-PCS | Mod: CPTII,95,, | Performed by: NURSE PRACTITIONER

## 2023-08-29 PROCEDURE — 1160F RVW MEDS BY RX/DR IN RCRD: CPT | Mod: CPTII,95,, | Performed by: NURSE PRACTITIONER

## 2023-08-29 PROCEDURE — 1159F PR MEDICATION LIST DOCUMENTED IN MEDICAL RECORD: ICD-10-PCS | Mod: CPTII,95,, | Performed by: NURSE PRACTITIONER

## 2023-08-29 PROCEDURE — 4010F ACE/ARB THERAPY RXD/TAKEN: CPT | Mod: CPTII,95,, | Performed by: NURSE PRACTITIONER

## 2023-08-29 PROCEDURE — 1160F PR REVIEW ALL MEDS BY PRESCRIBER/CLIN PHARMACIST DOCUMENTED: ICD-10-PCS | Mod: CPTII,95,, | Performed by: NURSE PRACTITIONER

## 2023-08-29 PROCEDURE — 3061F NEG MICROALBUMINURIA REV: CPT | Mod: CPTII,95,, | Performed by: NURSE PRACTITIONER

## 2023-08-29 RX ORDER — ZOLPIDEM TARTRATE 5 MG/1
5 TABLET ORAL NIGHTLY PRN
Qty: 30 TABLET | Refills: 3 | Status: SHIPPED | OUTPATIENT
Start: 2023-08-29 | End: 2023-11-27 | Stop reason: SDUPTHER

## 2023-08-29 RX ORDER — DONEPEZIL HYDROCHLORIDE 10 MG/1
10 TABLET, FILM COATED ORAL NIGHTLY
Qty: 30 TABLET | Refills: 11 | Status: SHIPPED | OUTPATIENT
Start: 2023-08-29 | End: 2024-08-28

## 2023-08-29 RX ORDER — LAMOTRIGINE 100 MG/1
100 TABLET ORAL DAILY
Qty: 30 TABLET | Refills: 11 | Status: SHIPPED | OUTPATIENT
Start: 2023-08-29 | End: 2023-12-12 | Stop reason: SDUPTHER

## 2023-08-29 NOTE — PROGRESS NOTES
Subjective:       Patient ID: Krysta Manuel is a 68 y.o. female.    Chief Complaint: Major neurcognitive disorder due to multiple etiologies, wi      Referred by PCP: Dr. Wanda Cook, DO    HPI  The patient is new to me, she is here for memory loss and behavior changes. The patient is accompanied by daughter/caretaker. The patient is presenting memory changes and behavior changes  that began in 10/2022, per daughter it has worsened since then. The main problems the patient has are related to progressive short term memory loss and changes in behavior. For example, the patient would repeat the same question over and over again. The patient forget conversations had with family.  The patient is not forgetting where placed certain things, daughter states she don't manage anything since having the stroke, her daughter provides her with assistance and care. Patient is very inactive. She talks to herself, easily upset. The patient do not drive, and never learned to drive. No confusion around and inside the house. No trouble remembering the date and time. Patient daughter is managing patients medications and appointments. Patient is aware of major holidays and political changes. The patient is daughter is handling finances. The patient requires assistance with ADLs. Daughter prepare meals, daughter provide assistance with baths. Patient able to feed her self.  No hallucinations or delusions. No seizures. Agitation behavioral problems. No problems handling tools. 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 Ambulate short distances with cane and uses wheelchair. Stroke Risk factors Risck factors HTN, HLD, DM, No history of headaches. Hx of  hypothyroidism. Chronic DM type II. PA fib takes Cardizem.  No history of alcoholism. No history of B12 deficiency. Chronic anxiety and depression Zoloft. Referral to physiatrists was missed No history of STDs.  No  "history of HIV infection. No toxic exposures. Left arm Hemiparesis, Stroke side  arm has abnormal movements at times per daughter. Left arm flaccid, gait instability. Urgency nocturia urinary incontinence. Takes ambiens for sleep partially helpful. Patient has decreased appetite. Multiple Siblings have dementia living ages (79, 75, onset). She was started on Namenda 10 mg BID by PCP on ,  tolerating well. Today MOCA 05- 25/30.      INTERVAL NOTE 08-: Patient present for follow up for memory with behavioral management. Patient accompanied by daughter. Patient is tolerating Namenda 10 mg BID no side effects. Tolerating Donepezil/Aricept 5 mg QHS no side effects. Daughter reports the  LTG 25 mg po BID is well tolerated but no changes noted in agitation and mood stabilization.Discussed plan of care.     Patient was previously managed by Dr. Pierre. He was prescribed patient Ambien 5 mg po HS for sleep( formerly managed by Dr. Pierre, who is no longer provider) Patient daughter request refill for Ambien, patient failed Trazodone but does well on Ambien 5 mg po no side effects. She is pending follow up for physiatry.     Review of Systems   Unable to perform ROS: Dementia   Constitutional:  Positive for activity change.   HENT:          Edentulous    Eyes: Negative.    Respiratory: Negative.     Cardiovascular: Negative.    Gastrointestinal: Negative.    Endocrine: Negative.    Genitourinary:  Positive for urgency.   Musculoskeletal:  Positive for arthralgias, back pain, gait problem and myalgias.   Allergic/Immunologic: Negative.    Neurological:  Positive for facial asymmetry, speech difficulty, weakness and numbness.   Hematological: Negative.    Psychiatric/Behavioral:  Positive for agitation, behavioral problems, confusion, decreased concentration, dysphoric mood and sleep disturbance.                  Current Outpatient Medications:     pen needle, diabetic (SURE COMFORT PEN NEEDLE) 32 gauge x 5/32" " Ndle, Use daily with insulin pen., Disp: 100 each, Rfl: 3    aspirin (ECOTRIN) 81 MG EC tablet, Take 1 tablet (81 mg total) by mouth once daily., Disp: 90 tablet, Rfl: 1    blood sugar diagnostic (TRUE METRIX GLUCOSE TEST STRIP) Strp, USE 3 TIMES DAILY, Disp: 100 each, Rfl: 11    clopidogreL (PLAVIX) 75 mg tablet, Take 75 mg by mouth once daily., Disp: , Rfl:     diltiaZEM (DILACOR XR) 240 MG CDCR, Take 240 mg by mouth once daily., Disp: , Rfl:     donepeziL (ARICEPT) 10 MG tablet, Take 1 tablet (10 mg total) by mouth every evening., Disp: 30 tablet, Rfl: 11    dulaglutide (TRULICITY) 4.5 mg/0.5 mL pen injector, Inject 4.5 mg into the skin every 7 days., Disp: 4 pen, Rfl: 6    EASY TOUCH LANCING DEVICE Misc, , Disp: , Rfl:     fluticasone propionate (FLONASE) 50 mcg/actuation nasal spray, 2 SPRAYS IN EACH NOSTRIL EVERY DAY AS NEEDED, Disp: 16 g, Rfl: 1    gabapentin (NEURONTIN) 100 MG capsule, Take 100 mg by mouth., Disp: , Rfl:     glipiZIDE (GLUCOTROL) 10 MG tablet, TAKE ONE TABLET BY MOUTH TWO TIMES A DAY BEFORE MEALS, Disp: 60 tablet, Rfl: 5    hydroCHLOROthiazide (HYDRODIURIL) 25 MG tablet, TAKE 1 TABLET (25 MG TOTAL) BY MOUTH ONCE DAILY., Disp: 90 tablet, Rfl: 3    HYDROcodone-acetaminophen (NORCO)  mg per tablet, , Disp: , Rfl: 0    irbesartan (AVAPRO) 300 MG tablet, TAKE 1 TABLET (300 MG TOTAL) BY MOUTH EVERY EVENING., Disp: 90 tablet, Rfl: 3    lamoTRIgine (LAMICTAL) 100 MG tablet, Take 1 tablet (100 mg total) by mouth once daily., Disp: 30 tablet, Rfl: 11    lancing device with lancets Kit, 1 each by Misc.(Non-Drug; Combo Route) route 3 (three) times daily., Disp: 200 each, Rfl: 10    LANTUS SOLOSTAR U-100 INSULIN glargine 100 units/mL SubQ pen, Inject 66 Units into the skin once daily., Disp: 30 mL, Rfl: 5    levothyroxine (SYNTHROID) 125 MCG tablet, TAKE 1 TABLET (125 MCG TOTAL) BY MOUTH BEFORE BREAKFAST., Disp: 90 tablet, Rfl: 2    linaCLOtide (LINZESS) 290 mcg Cap capsule, Take 1 capsule (290 mcg  total) by mouth once daily., Disp: 30 capsule, Rfl: 11    memantine (NAMENDA) 10 MG Tab, Take 1 tablet (10 mg total) by mouth 2 (two) times daily., Disp: 180 tablet, Rfl: 1    metFORMIN (GLUCOPHAGE-XR) 500 MG ER 24hr tablet, TAKE 2 TABLETS (1,000 MG TOTAL) BY MOUTH 2 (TWO) TIMES DAILY WITH MEALS., Disp: 360 tablet, Rfl: 1    metoprolol succinate (TOPROL-XL) 100 MG 24 hr tablet, Take 100 mg by mouth., Disp: , Rfl:     pioglitazone (ACTOS) 15 MG tablet, Take 15 mg by mouth every morning., Disp: , Rfl:     polyethylene glycol (GLYCOLAX) 17 gram/dose powder, Take 17 g by mouth once daily., Disp: 595 g, Rfl: 6    rosuvastatin (CRESTOR) 10 MG tablet, Take 1 tablet (10 mg total) by mouth nightly., Disp: 90 tablet, Rfl: 1    sertraline (ZOLOFT) 100 MG tablet, Take 1.5 tablets (150 mg total) by mouth once daily., Disp: 135 tablet, Rfl: 1    zolpidem (AMBIEN) 5 MG Tab, Take 1 tablet (5 mg total) by mouth nightly as needed (INSOMNIA)., Disp: 30 tablet, Rfl: 3  Past Medical History:   Diagnosis Date    Arthritis     Carotid artery disease     Dr. Jessa Szymanski--cardiology    Depression     DM II (diabetes mellitus, type II), controlled 12 years    Heart disease     Dr. Jessa Szymanski--cardiology    HTN (hypertension)     Hyperlipidemia     Hypothyroidism     Insomnia     Stroke 2010    Dr. Jessa Szymanski--cardiology    Vitamin D deficiency      Past Surgical History:   Procedure Laterality Date    CHOLECYSTECTOMY      COLONOSCOPY      COLONOSCOPY N/A 8/15/2023    Procedure: COLONOSCOPY;  Surgeon: Fina Vieyra MD;  Location: [facility];  Service: Endoscopy;  Laterality: N/A;    HYSTERECTOMY      THYROIDECTOMY      TOTAL ABDOMINAL HYSTERECTOMY W/ BILATERAL SALPINGOOPHORECTOMY       Social History     Socioeconomic History    Marital status:    Tobacco Use    Smoking status: Never    Smokeless tobacco: Never   Substance and Sexual Activity    Alcohol use: Not Currently    Drug use: Never             Past/Current  Medical/Surgical History, Past/Current Social History, Past/Current Family History and Past/Current Medications were reviewed in detail.        Objective:           VITAL SIGNS WERE REVIEWED      GENERAL APPEARANCE:     The patient looks comfortable, upset, cooperative.    BMI 35.76 KG     No signs of respiratory distress.    Normal breathing pattern.    No dysmorphic features    Normal eye contact.     GENERAL MEDICAL EXAM:    HEENT:  Head is atraumatic normocephalic.     No tender temporal arteries. Fundoscopic (Ophthalmoscopic) exam showed no disc edema.      Neck and Axillae: No JVD. No visible lesions.    No carotid bruits. No thyromegaly. No lymphadenopathy.    Cardiopulmonary: No cyanosis. No tachypnea. Normal respiratory effort.    Clear breath sounds. S1, S2 with regular rhythm . No murmurs.     Gastrointestinal/Urogenital:  No jaundice. No stomas or lesions. No visible hernias. No catheters.     Abdomen is soft non-tender. No masses or organomegaly.    Skin, Hair and Nails: No pathognonomic skin rash. No neurofibromatosis. No visible lesions.No stigmata of autoimmune disease. No clubbing.    Skin is warm and moist. No palpable masses.    Limbs: No varicose veins. No visible swelling.    No palpable edema. Pulses are symmetric. Pedal pulses are palpable.      Muskoskeletal: No visible deformities.No visible lesions.    No spine tenderness. No signs of longstanding neuropathy. No dislocations or fractures.            Neurologic Exam     Mental Status   Oriented to person, place, and time.   Follows 2 step commands.   Attention: decreased. Concentration: decreased.   Speech: slurred   Level of consciousness: alert  Knowledge: poor and inconsistent with education. Able to perform simple calculations.   Able to name object. Able to read. Able to repeat. Able to write. Abnormal comprehension.     Cranial Nerves     CN II   Visual fields full to confrontation.   Visual acuity: normal  Right visual field deficit:  none  Left visual field deficit: none     CN III, IV, VI   Pupils are equal, round, and reactive to light.  Extraocular motions are normal.   Right pupil: Size: 2 mm. Shape: regular. Reactivity: brisk. Consensual response: intact. Accommodation: intact.   Left pupil: Size: 2 mm. Shape: regular. Reactivity: brisk. Consensual response: intact. Accommodation: intact.   CN III: no CN III palsy  CN VI: no CN VI palsy  Nystagmus: none   Diplopia: none  Ophthalmoparesis: none  Upgaze: normal  Downgaze: normal  Conjugate gaze: present  Vestibulo-ocular reflex: present    CN V   Facial sensation intact.   Right facial sensation deficit: none  Left facial sensation deficit: complete  Left corneal reflex: abnormal    CN VII   Right facial weakness: none  Left facial weakness: peripheral    CN VIII   CN VIII normal.     CN IX, X   CN IX normal.   CN X normal.   Palate: asymmetric    CN XI   CN XI normal.   Right sternocleidomastoid strength: normal  Right trapezius strength: normal    CN XII   CN XII normal.   Tongue: not atrophic  Fasciculations: absent  Tongue deviation: left    Motor Exam   Right arm tone: normal  Left arm tone: decreased  Right arm pronator drift: absent  Left arm pronator drift: present  Right leg tone: normal  Left leg tone: decreased    Strength   Strength 5/5 throughout.   Right neck flexion: 5/5  Left neck flexion: 0/5  Right neck extension: 5/5  Left neck extension: 0/5  Right deltoid: 5/5  Left deltoid: 0/5  Right biceps: 5/5  Left biceps: 0/5  Right triceps: 5/5  Left triceps: 0/5  Right wrist flexion: 5/5  Left wrist flexion: 0/5  Right wrist extension: 5/5  Left wrist extension: 0/5  Right interossei: 5/5  Left interossei: 0/5  Right iliopsoas: 5/5  Left iliopsoas: 4/5  Right quadriceps: 5/5  Left quadriceps: 4/5  Right hamstrin/5  Left hamstrin/5  Right glutei: 5/5  Left glutei: 4/5  Right anterior tibial: 5/5  Left anterior tibial: 4/5  Right posterior tibial: 5/5  Left posterior tibial:  4/5  Right peroneal: 5/5  Left peroneal: 4/5  Right gastroc: 5/5  Left gastroc: 4/5    Sensory Exam   Light touch normal.   Right arm light touch: normal  Left arm light touch: normal  Right leg light touch: normal  Left leg light touch: normal  Vibration normal.   Right arm vibration: normal  Left arm vibration: normal  Right leg vibration: normal  Left leg vibration: normal  Proprioception normal.   Right arm proprioception: normal  Left arm proprioception: normal  Right leg proprioception: normal  Left leg proprioception: normal  Pinprick normal.   Right arm pinprick: normal  Left arm pinprick: normal  Right leg pinprick: normal  Left leg pinprick: normal  Sensory deficit distribution on left: lateral cutaneous thigh  Graphesthesia: normal  Stereognosis: normal    Gait, Coordination, and Reflexes     Gait  Gait: normal    Coordination   Romberg: negative  Finger to nose coordination: normal  Heel to shin coordination: normal  Tandem walking coordination: normal    Tremor   Resting tremor: absent  Intention tremor: absent  Action tremor: left arm and right arm    Reflexes   Right brachioradialis: 2+  Left brachioradialis: 2+  Right biceps: 2+  Left biceps: 2+  Right triceps: 2+  Left triceps: 2+  Right patellar: 2+  Left patellar: 2+  Right achilles: 2+  Left achilles: 2+  Right : 2+  Left : 2+  Right plantar: normal  Left plantar: normal  Right Ortez: absent  Left Ortez: absent  Right ankle clonus: absent  Left ankle clonus: absent  Right pendular knee jerk: absent  Left pendular knee jerk: absent        SHIN COGNITIVE ASSESSMENT (MOCA) TOTAL SCORE 30         NORMAL-MILD NCD 26-30    MILD DEMENTIA 20-25    Recall recovered with 2 cue    +1 for education 9 th grade completion     DATE 05-       TOTAL SCORE 25       VISUOSPATIAL EXECUTIVE (5) 3       NAMING (3) 3       ATTENTION (6) 5       LANGUAGE (3) 3       ABSTRACTION(2) 1       RECALL (5) 2       ORIENTATION (6) 6             Lab  Results   Component Value Date    WBC 8.69 02/13/2023    HGB 10.9 (L) 02/13/2023    HCT 36.3 (L) 02/13/2023    MCV 87 02/13/2023     02/13/2023     Sodium   Date Value Ref Range Status   02/13/2023 140 136 - 145 mmol/L Final     Potassium   Date Value Ref Range Status   02/13/2023 4.2 3.5 - 5.1 mmol/L Final     Chloride   Date Value Ref Range Status   02/13/2023 105 95 - 110 mmol/L Final     CO2   Date Value Ref Range Status   02/13/2023 25 23 - 29 mmol/L Final     Glucose   Date Value Ref Range Status   02/13/2023 95 70 - 110 mg/dL Final     BUN   Date Value Ref Range Status   02/13/2023 14 8 - 23 mg/dL Final     Creatinine   Date Value Ref Range Status   02/13/2023 0.9 0.5 - 1.4 mg/dL Final     Calcium   Date Value Ref Range Status   02/13/2023 10.1 8.7 - 10.5 mg/dL Final     Total Protein   Date Value Ref Range Status   02/13/2023 7.4 6.0 - 8.4 g/dL Final     Albumin   Date Value Ref Range Status   02/13/2023 3.6 3.5 - 5.2 g/dL Final     Total Bilirubin   Date Value Ref Range Status   02/13/2023 0.4 0.1 - 1.0 mg/dL Final     Comment:     For infants and newborns, interpretation of results should be based  on gestational age, weight and in agreement with clinical  observations.    Premature Infant recommended reference ranges:  Up to 24 hours.............<8.0 mg/dL  Up to 48 hours............<12.0 mg/dL  3-5 days..................<15.0 mg/dL  6-29 days.................<15.0 mg/dL       Alkaline Phosphatase   Date Value Ref Range Status   02/13/2023 70 55 - 135 U/L Final     AST   Date Value Ref Range Status   02/13/2023 15 10 - 40 U/L Final     ALT   Date Value Ref Range Status   02/13/2023 18 10 - 44 U/L Final     Anion Gap   Date Value Ref Range Status   02/13/2023 10 8 - 16 mmol/L Final     eGFR if    Date Value Ref Range Status   07/18/2022 >60.0 >60 mL/min/1.73 m^2 Final     eGFR if non    Date Value Ref Range Status   07/18/2022 58.0 (A) >60 mL/min/1.73 m^2 Final      Comment:     Calculation used to obtain the estimated glomerular filtration  rate (eGFR) is the CKD-EPI equation.        Lab Results   Component Value Date    BDRVLLAV32 416 05/15/2023     Lab Results   Component Value Date    TSH 0.637 05/15/2023    FREET4 0.96 06/06/2022   Labs Reviewed:    05-    FA >40.0^, HIV neg, Vitamin B 12 416 NL, TSH 0.637 NL, HC 8.5 NL,       EKG Results:    05-    QT interval 396 NL, HR 81     MRI Brain WO (Care Everywhere)     01-    Chronic lacunar type infarction of the right basal ganglia, with gliosis extending into the thalamus and centrum semiovale. The ventricles are normal in size. No extra-axial fluid collections. Expected arterial flow voids are demonstrated. Visualized sinuses and mastoids are clear. Bone marrow signal is within normal limits.    MRI ANGIOGRAM HEAD NECK WO (Care Everywhere)      01-  There is no large vessel occlusion or severe stenosis.   Distal vessel wall irregularity is in part due to motion artifact, but could also represent underlying small vessel vasculopathy.    Normal MRA of the neck    CT Head WO (Care Everywhere)     01-    No CT evidence of an acute intracranial process  Old infarction right basal ganglia and corona radiata.    EEG Results:    05-    The EEG is normal in the awake and sleep states.       MRI Brain WO:    05-    No acute findings.     Mild atrophy.     Large chronic right basal ganglia/periventricular white matter infarct with associated white matter degeneration.     Chronic right maxillary sinus mucosal thickening.  Assessment:       1. Major neurocognitive disorder due to multiple etiologies, with agitation    2. At risk for prolonged QT interval syndrome    3. Dementia without behavioral disturbance    4. Major depression in remission    5. Depression, major, recurrent, moderate    6. History of stroke    7. Hemiparesis affecting left side as late effect of cerebrovascular  accident    8. Major depressive disorder, remission status unspecified, unspecified whether recurrent    9. Hypothyroidism (acquired)    10. Insomnia, unspecified type    11. Anxiety        TModified Amor Score (m-RS)      0  The patient has no residual symptoms.    1  The patient has no significant disability; able to carry out all pre-stroke activities.    2  The patient has slight disability; unable to carry out all pre-stroke activities but able to look after self without daily help.    3  The patient has moderate disability; requiring some external help but able to walk without the assistance of another individual.    4  The patient has moderately severe disability; unable to walk or attend to bodily functions without assistance of another individual.    5  The patient has severe disability; bedridden, incontinent, requires continuous care.    6  The patient has  (during the hospital stay or after discharge from the hospital).    Plan:         MAJOR NEUROCOGNITIVE DISORDER DUE TO MULTIPLE ETIOLOGIES WITH AGITATION/ VASCULAR VS AD DEMENTIA/  HX OF RT BASAL GANGLIA AND ENRIQUEZ RADIATA STROKE WITH DEFICITS/ MDD/DOREEN/ HYPOTHYROIDISM/        EVALUATION     Comprehensive Neuropsychological Testing.    Explained to the patient and family that medications are intended to slow down the progression and not stop it or reverse the disease.The disease is relentless, progressive and so far cannot be controlled.     I counseled the patient and family that the rate of progression is extremely variable and the average (10 years) is an inaccurate measure.     Continue memantine/Namenda 10 mg BID.    Increase Donepezil/Aricept 10 mg QHS .The side effects include dizziness, diarrhea and nightmares and discussed with patient and family. If GI symptoms become an issue will try rivastigmine/Exelon patches.    EKG today     Follow up with Psychiatry Dr. Rodriguez     SYMPTOMATIC MANAGEMENT     Increase  mg Daily  to help  for agitation and mood stabilization.    Restart Ambien 5 mg po HS ( formerly managed by Dr. Pierre, who is no longer provider)     Future consideration:     Risperdal 1 mg QHS to assist with psychotic symptoms and behavioral disturbances   excessive sedation or paradoxical agitation.      Failed Trazodone per patient daughter.     HOME CARE     Pallative care     Falling Down Precautions. Gait is affected by the disease as well.     Avoid driving and access to firearms     Incremental 24/Care     Help with finances and decision making.    Join support group.    Proofing the house and use labeling.    Avoid antihistamines and anticholinergics.    Avoid changing routine.    Use written reminders.    Avoid multitasking.    Healthy diet, exercise (physical and cognitive).    Good sleep hygiene.    For behavioral problems I recommended that family to try some of the following communication tips: Try not to react and stay calm, Don't argue , Do not use logic, Let the patient feel safe, Keep reminding the patient and use photos if necessary, Distract the patient, Search for things to distract the person, then talk about what you found. For example, talk about a photograph or keepsake or even food, Use gentle touching or hugging to show you care, Body language matters, Use a cooling off period if needed, when possible. If safe to do so, give the patient some space or breathing room. Will consider antipsychotic medications as a last resort because of the risk of CAD and CVD.    Recommend reading the book: The 36-Hour Day and there are many online and printed resources to help caregivers as well.     For More Information About Hallucinations, Delusions, and Paranoia in Alzheimer's   [facility] Alzheimer's and related Dementias Education and Referral (ADEAR) Center   1-828.932.1218 (toll-free)   karis@mercedes.nih.gov   www.mer[employer].nih.gov/alzheimers   The [employer]'s ADEAR Center offers information and free print  publications about Alzheimer's disease and related dementias for families, caregivers, and health professionals. [facility] staff answer telephone, email, and written requests and make referrals to local and national resources      PREVENTION OF DELIRIUM       1. Good hydration and avoid electrolyte imbalance  2. Recognize andtreat infections immediately especially UTI.  3. Bladder emptying and prevent constipation.   4. Provide stimulating activities and familiar objects  5. Use eyeglasses and hearing aids if needed.   6. Use simple and regular communication about people, current place and time  7. Mobility and range-of-motion exercises  8. Reduce noise, lighting and avoid sleep interruptions  9. Non-narcotic pain management.  10.Nondrug treatment for sleep problems or anxiety  11. Avoid antihistamines.  12. Avoid narcotics.  13. Avoid benzodiazepines.       HX OF RT BRAIN BASAL GANGLIA AND ENRIQUEZ RADIATA STROKE WITH DEFICIT DYSARTHRIA, FACIAL DROOP LEFT SIDE, LEFT UPPER ARM PARAYRALTS LEFT  SIDE HEMIPARESIS    (2010 Right Brain Stroke with deficts left facial droop, Dysarthria, Left ARM HEMPARSIS, LEFT sided weakness/ Stroke Risk factors Risck factors HTN, HLD, DM)    Vascular Risk Factors (VRFs) stratification (BP, BS, BC control and Smoking Cessation) is the mainstay of stroke prevention (>90%). The benefit of antiplatelets is < 20% stroke risk reduction.       Wheelchair, Cane     Healthy diet and exercise    Avoid driving.    Call 911 if any SUDDEN:   Weakness  Numbness  Slurring of speech  Speech difficulty   Vertigo  Loss of balance  Loss of vision  Loss of hearing  Double vision  Trouble swallowing  Trouble breathing  Facial drooping        MEDICAL/SURGICAL COMORBIDITIES     All relevant medical comorbidities noted and managed by primary care physician and medical care team.          MISCELLANEOUS MEDICAL PROBLEMS       HEALTHY LIFESTYLE AND PREVENTATIVE CARE    Encouraged the patient to adhere to the  age-appropriate health maintenance guidelines including screening tests and vaccinations.     Discussed the overall importance of healthy lifestyle, optimal weight, exercise, healthy diet, good sleep hygiene and avoiding drugs including smoking, alcohol and recreational drugs. The patient verbalized full understanding.       Advised the patient to follow COVID-19 prevention measures.       I spent 160 minutes  more than 50 % spent face to face with the patient  Time spent in counseling and coordination of care including discussions etiology of diagnosis, pathogenesis of diagnosis, prognosis of diagnosis,, diagnostic results, impression and recommendations, diagnostic studies, management, risks and benefits of treatment, instructions of disease self-management, treatment instructions, follow up requirements, patient and family counseling/involvement in care compliance with treatment regimen. All of the patient's questions were answered during this discussion.       Zahraa Laurent, MSN NP      Collaborating Provider: Calin Matthew MD, FAAN Neurologist/Epileptologist

## 2023-09-01 ENCOUNTER — TELEPHONE (OUTPATIENT)
Dept: INTERNAL MEDICINE | Facility: CLINIC | Age: 68
End: 2023-09-01
Payer: MEDICARE

## 2023-09-01 NOTE — TELEPHONE ENCOUNTER
Home health nurse called to see if a verbal can be given for patient annual labs be completed. Verbal given.

## 2023-09-01 NOTE — TELEPHONE ENCOUNTER
----- Message from Madeleine Porter MA sent at 9/1/2023  2:55 PM CDT -----    ----- Message -----  From: Bebe Downs  Sent: 9/1/2023  11:48 AM CDT  To: Julio Cesar REDMOND Staff    Name of Who is Calling: [facility]        What is the request in detail: Would like to know if pt can get orders for her to have labs completed with Home Health nurse.        Can the clinic reply by [facility]: no       What Number to Call Back if not in [facility]:  [phone]

## 2023-09-05 PROCEDURE — 99284 EMERGENCY DEPT VISIT MOD MDM: CPT | Mod: 25

## 2023-09-06 ENCOUNTER — HOSPITAL ENCOUNTER (EMERGENCY)
Facility: HOSPITAL | Age: 68
Discharge: HOME | End: 2023-09-06
Attending: EMERGENCY MEDICINE
Payer: MEDICARE

## 2023-09-06 VITALS
WEIGHT: 205 LBS | RESPIRATION RATE: 18 BRPM | DIASTOLIC BLOOD PRESSURE: 70 MMHG | HEIGHT: 63 IN | BODY MASS INDEX: 36.32 KG/M2 | OXYGEN SATURATION: 98 % | TEMPERATURE: 98 F | SYSTOLIC BLOOD PRESSURE: 149 MMHG | HEART RATE: 73 BPM

## 2023-09-06 DIAGNOSIS — I10 ESSENTIAL HYPERTENSION, MALIGNANT: Primary | ICD-10-CM

## 2023-09-06 DIAGNOSIS — E03.2 IATROGENIC HYPOTHYROIDISM: ICD-10-CM

## 2023-09-06 DIAGNOSIS — E78.5 HYPERLIPEMIA: ICD-10-CM

## 2023-09-06 DIAGNOSIS — N28.9 FUNCTION KIDNEY DECREASED: Primary | ICD-10-CM

## 2023-09-06 DIAGNOSIS — R11.0 NAUSEA: ICD-10-CM

## 2023-09-06 DIAGNOSIS — E11.9 DIABETES MELLITUS WITHOUT COMPLICATION: ICD-10-CM

## 2023-09-06 LAB
ALBUMIN SERPL BCP-MCNC: 4 G/DL (ref 3.5–5.2)
ALP SERPL-CCNC: 67 U/L (ref 55–135)
ALT SERPL W/O P-5'-P-CCNC: 12 U/L (ref 10–44)
ANION GAP SERPL CALC-SCNC: 16 MMOL/L (ref 8–16)
AST SERPL-CCNC: 14 U/L (ref 10–40)
BACTERIA #/AREA URNS HPF: ABNORMAL /HPF
BASOPHILS # BLD AUTO: 0.03 K/UL (ref 0–0.2)
BASOPHILS NFR BLD: 0.3 % (ref 0–1.9)
BILIRUB SERPL-MCNC: 0.4 MG/DL (ref 0.1–1)
BILIRUB UR QL STRIP: NEGATIVE
BUN SERPL-MCNC: 23 MG/DL (ref 8–23)
CALCIUM SERPL-MCNC: 10 MG/DL (ref 8.7–10.5)
CHLORIDE SERPL-SCNC: 103 MMOL/L (ref 95–110)
CLARITY UR: ABNORMAL
CO2 SERPL-SCNC: 22 MMOL/L (ref 23–29)
COLOR UR: YELLOW
CREAT SERPL-MCNC: 1.3 MG/DL (ref 0.5–1.4)
DIFFERENTIAL METHOD: ABNORMAL
EOSINOPHIL # BLD AUTO: 0 K/UL (ref 0–0.5)
EOSINOPHIL NFR BLD: 0.4 % (ref 0–8)
ERYTHROCYTE [DISTWIDTH] IN BLOOD BY AUTOMATED COUNT: 14.7 % (ref 11.5–14.5)
EST. GFR  (NO RACE VARIABLE): 45 ML/MIN/1.73 M^2
GLUCOSE SERPL-MCNC: 174 MG/DL (ref 70–110)
GLUCOSE UR QL STRIP: NEGATIVE
HCT VFR BLD AUTO: 36.6 % (ref 37–48.5)
HGB BLD-MCNC: 11.8 G/DL (ref 12–16)
HGB UR QL STRIP: NEGATIVE
HYALINE CASTS #/AREA URNS LPF: 20 /LPF
IMM GRANULOCYTES # BLD AUTO: 0.03 K/UL (ref 0–0.04)
IMM GRANULOCYTES NFR BLD AUTO: 0.3 % (ref 0–0.5)
KETONES UR QL STRIP: NEGATIVE
LEUKOCYTE ESTERASE UR QL STRIP: ABNORMAL
LYMPHOCYTES # BLD AUTO: 1.3 K/UL (ref 1–4.8)
LYMPHOCYTES NFR BLD: 12.4 % (ref 18–48)
MCH RBC QN AUTO: 27.7 PG (ref 27–31)
MCHC RBC AUTO-ENTMCNC: 32.2 G/DL (ref 32–36)
MCV RBC AUTO: 86 FL (ref 82–98)
MICROSCOPIC COMMENT: ABNORMAL
MONOCYTES # BLD AUTO: 0.5 K/UL (ref 0.3–1)
MONOCYTES NFR BLD: 4.2 % (ref 4–15)
NEUTROPHILS # BLD AUTO: 8.9 K/UL (ref 1.8–7.7)
NEUTROPHILS NFR BLD: 82.4 % (ref 38–73)
NITRITE UR QL STRIP: NEGATIVE
NRBC BLD-RTO: 0 /100 WBC
PH UR STRIP: 6 [PH] (ref 5–8)
PLATELET # BLD AUTO: 251 K/UL (ref 150–450)
PMV BLD AUTO: 9.9 FL (ref 9.2–12.9)
POTASSIUM SERPL-SCNC: 4.7 MMOL/L (ref 3.5–5.1)
PROT SERPL-MCNC: 7.9 G/DL (ref 6–8.4)
PROT UR QL STRIP: ABNORMAL
RBC # BLD AUTO: 4.26 M/UL (ref 4–5.4)
RBC #/AREA URNS HPF: 2 /HPF (ref 0–4)
SODIUM SERPL-SCNC: 141 MMOL/L (ref 136–145)
SP GR UR STRIP: 1.03 (ref 1–1.03)
SQUAMOUS #/AREA URNS HPF: 3 /HPF
TROPONIN I SERPL DL<=0.01 NG/ML-MCNC: 0.01 NG/ML (ref 0–0.03)
URN SPEC COLLECT METH UR: ABNORMAL
UROBILINOGEN UR STRIP-ACNC: NEGATIVE EU/DL
WBC # BLD AUTO: 10.79 K/UL (ref 3.9–12.7)
WBC #/AREA URNS HPF: 6 /HPF (ref 0–5)

## 2023-09-06 PROCEDURE — 93005 ELECTROCARDIOGRAM TRACING: CPT

## 2023-09-06 PROCEDURE — 63600175 PHARM REV CODE 636 W HCPCS: Performed by: EMERGENCY MEDICINE

## 2023-09-06 PROCEDURE — 93010 EKG 12-LEAD: ICD-10-PCS | Mod: ,,, | Performed by: INTERNAL MEDICINE

## 2023-09-06 PROCEDURE — 84484 ASSAY OF TROPONIN QUANT: CPT | Performed by: EMERGENCY MEDICINE

## 2023-09-06 PROCEDURE — 80053 COMPREHEN METABOLIC PANEL: CPT | Performed by: EMERGENCY MEDICINE

## 2023-09-06 PROCEDURE — 85025 COMPLETE CBC W/AUTO DIFF WBC: CPT | Performed by: EMERGENCY MEDICINE

## 2023-09-06 PROCEDURE — 81000 URINALYSIS NONAUTO W/SCOPE: CPT | Performed by: EMERGENCY MEDICINE

## 2023-09-06 PROCEDURE — 93010 ELECTROCARDIOGRAM REPORT: CPT | Mod: ,,, | Performed by: INTERNAL MEDICINE

## 2023-09-06 PROCEDURE — 96374 THER/PROPH/DIAG INJ IV PUSH: CPT

## 2023-09-06 RX ORDER — ONDANSETRON 2 MG/ML
4 INJECTION INTRAMUSCULAR; INTRAVENOUS
Status: COMPLETED | OUTPATIENT
Start: 2023-09-06 | End: 2023-09-06

## 2023-09-06 RX ORDER — ONDANSETRON 4 MG/1
4 TABLET, FILM COATED ORAL EVERY 8 HOURS PRN
Qty: 12 TABLET | Refills: 0 | Status: SHIPPED | OUTPATIENT
Start: 2023-09-06 | End: 2023-10-16

## 2023-09-06 RX ADMIN — ONDANSETRON 4 MG: 2 INJECTION INTRAMUSCULAR; INTRAVENOUS at 04:09

## 2023-09-06 NOTE — ED PROVIDER NOTES
SCRIBE #1 NOTE: I, Leslye Fairbanks, am scribing for, and in the presence of, Ladan Hernandez MD. I have scribed the entire note.       History     Chief Complaint   Patient presents with    Nausea     Pt states she was having nausea and feeling clammy but symptoms have resolved since having a bowel movement. Pt denies any symptoms at this time. States she would like to be checked out because the same thing happened twice today      Review of patient's allergies indicates:  No Known Allergies      History of Present Illness     HPI    9/6/2023, 3:58 AM  History obtained from the patient  Additional history obtained from independent historian: patient's daughter at bedside      History of Present Illness: Krysta Manuel is a 68 y.o. 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 The pt's daughter reports that she started taking Cardura today; she took her first dose 2 hours prior to the onset of her sxs. Symptoms are constant and moderate in severity. No mitigating or exacerbating factors reported. Associated sxs include diaphoresis. Patient denies any fever, chills, vomiting, diarrhea, CP, SOB, abdominal pain, dysuria, and all other sxs at this time. No prior Tx reports. No further complaints or concerns at this time.       Arrival mode: EMS    PCP: Wanda Cook DO        Past Medical History:  Past Medical History:   Diagnosis Date    Arthritis     Carotid artery disease     Dr. Jessa Szymanski--cardiology    Depression     DM II (diabetes mellitus, type II), controlled 12 years    Heart disease     Dr. Jessa Szymanski--cardiology    HTN (hypertension)     Hyperlipidemia     Hypothyroidism     Insomnia     Stroke 2010    Dr. Jessa Szymanski--cardiology    Vitamin D deficiency        Past Surgical History:  Past Surgical History:   Procedure Laterality Date     CHOLECYSTECTOMY      COLONOSCOPY      COLONOSCOPY N/A 8/15/2023    Procedure: COLONOSCOPY;  Surgeon: Fina Vieyra MD;  Location: [facility];  Service: Endoscopy;  Laterality: N/A;    HYSTERECTOMY      THYROIDECTOMY      TOTAL ABDOMINAL HYSTERECTOMY W/ BILATERAL SALPINGOOPHORECTOMY           Family History:  Family History   Problem Relation Age of Onset    Hypertension Mother     Diabetes Sister        Social History:  Social History     Tobacco Use    Smoking status: Never    Smokeless tobacco: Never   Substance and Sexual Activity    Alcohol use: Not Currently    Drug use: Never    Sexual activity: Not on file        Review of Systems     Review of Systems   Constitutional:  Positive for diaphoresis. Negative for chills and fever.   HENT:  Negative for sore throat.    Respiratory:  Negative for shortness of breath.    Cardiovascular:  Negative for chest pain.   Gastrointestinal:  Positive for nausea. Negative for abdominal pain, diarrhea and vomiting.   Genitourinary:  Negative for dysuria.   Musculoskeletal:  Negative for back pain.   Skin:  Negative for rash.   Neurological:  Negative for weakness.   Hematological:  Does not bruise/bleed easily.   All other systems reviewed and are negative.     Physical Exam     Initial Vitals [09/05/23 2326]   BP Pulse Resp Temp SpO2   (!) 104/56 73 18 97.8 °F (36.6 °C) 97 %      MAP       --          Physical Exam  Nursing Notes and Vital Signs Reviewed.  Constitutional: Patient is in no acute distress. Well-developed and well-nourished.  Head: Atraumatic. Normocephalic.  Eyes: PERRL. EOM intact. Conjunctivae are not pale. No scleral icterus.  ENT: Mucous membranes are moist. Oropharynx is clear and symmetric.    Neck: Supple. Full ROM. No lymphadenopathy.  Cardiovascular: Regular rate. Regular rhythm. No murmurs, rubs, or gallops. Distal pulses are 2+ and symmetric.  Pulmonary/Chest: No respiratory distress. Clear to auscultation bilaterally. No wheezing or  "rales.  Abdominal: Soft and non-distended.  There is no tenderness.  No rebound, guarding, or rigidity. Good bowel sounds.  Genitourinary: No CVA tenderness  Musculoskeletal: Left hemiparesis. No obvious deformities. No edema. No calf tenderness.  Skin: Warm and dry.  Neurological:  Alert, awake, and appropriate.  Normal speech.  No acute focal neurological deficits are appreciated.  Psychiatric: Normal affect. Good eye contact. Appropriate in content.     ED Course   Procedures  ED Vital Signs:  Vitals:    09/05/23 2326 09/06/23 0401 09/06/23 0411 09/06/23 0501   BP: (!) 104/56  (!) 148/64 (!) 149/70   Pulse: 73 72 74 73   Resp: 18      Temp: 97.8 °F (36.6 °C)      TempSrc: Oral      SpO2: 97%  99% 98%   Weight: 93 kg (205 lb)      Height: 5' 3" (1.6 m)          Abnormal Lab Results:  Labs Reviewed   CBC W/ AUTO DIFFERENTIAL - Abnormal; Notable for the following components:       Result Value    Hemoglobin 11.8 (*)     Hematocrit 36.6 (*)     RDW 14.7 (*)     Gran # (ANC) 8.9 (*)     Gran % 82.4 (*)     Lymph % 12.4 (*)     All other components within normal limits   COMPREHENSIVE METABOLIC PANEL - Abnormal; Notable for the following components:    CO2 22 (*)     Glucose 174 (*)     eGFR 45 (*)     All other components within normal limits   URINALYSIS, REFLEX TO URINE CULTURE - Abnormal; Notable for the following components:    Appearance, UA Hazy (*)     Protein, UA 1+ (*)     Leukocytes, UA 1+ (*)     All other components within normal limits    Narrative:     Specimen Source->Urine   URINALYSIS MICROSCOPIC - Abnormal; Notable for the following components:    WBC, UA 6 (*)     Hyaline Casts, UA 20 (*)     All other components within normal limits    Narrative:     Specimen Source->Urine   TROPONIN I        All Lab Results:  Results for orders placed or performed during the hospital encounter of 09/06/23   CBC auto differential   Result Value Ref Range    WBC 10.79 3.90 - 12.70 K/uL    RBC 4.26 4.00 - 5.40 M/uL "    Hemoglobin 11.8 (L) 12.0 - 16.0 g/dL    Hematocrit 36.6 (L) 37.0 - 48.5 %    MCV 86 82 - 98 fL    MCH 27.7 27.0 - 31.0 pg    MCHC 32.2 32.0 - 36.0 g/dL    RDW 14.7 (H) 11.5 - 14.5 %    Platelets 251 150 - 450 K/uL    MPV 9.9 9.2 - 12.9 fL    Immature Granulocytes 0.3 0.0 - 0.5 %    Gran # (ANC) 8.9 (H) 1.8 - 7.7 K/uL    Immature Grans (Abs) 0.03 0.00 - 0.04 K/uL    Lymph # 1.3 1.0 - 4.8 K/uL    Mono # 0.5 0.3 - 1.0 K/uL    Eos # 0.0 0.0 - 0.5 K/uL    Baso # 0.03 0.00 - 0.20 K/uL    nRBC 0 0 /100 WBC    Gran % 82.4 (H) 38.0 - 73.0 %    Lymph % 12.4 (L) 18.0 - 48.0 %    Mono % 4.2 4.0 - 15.0 %    Eosinophil % 0.4 0.0 - 8.0 %    Basophil % 0.3 0.0 - 1.9 %    Differential Method Automated    Comprehensive metabolic panel   Result Value Ref Range    Sodium 141 136 - 145 mmol/L    Potassium 4.7 3.5 - 5.1 mmol/L    Chloride 103 95 - 110 mmol/L    CO2 22 (L) 23 - 29 mmol/L    Glucose 174 (H) 70 - 110 mg/dL    BUN 23 8 - 23 mg/dL    Creatinine 1.3 0.5 - 1.4 mg/dL    Calcium 10.0 8.7 - 10.5 mg/dL    Total Protein 7.9 6.0 - 8.4 g/dL    Albumin 4.0 3.5 - 5.2 g/dL    Total Bilirubin 0.4 0.1 - 1.0 mg/dL    Alkaline Phosphatase 67 55 - 135 U/L    AST 14 10 - 40 U/L    ALT 12 10 - 44 U/L    eGFR 45 (A) >60 mL/min/1.73 m^2    Anion Gap 16 8 - 16 mmol/L   Troponin I   Result Value Ref Range    Troponin I 0.011 0.000 - 0.026 ng/mL   Urinalysis, Reflex to Urine Culture Urine, Clean Catch    Specimen: Urine   Result Value Ref Range    Specimen UA Urine, Clean Catch     Color, UA Yellow Yellow, Straw, Ayaka    Appearance, UA Hazy (A) Clear    pH, UA 6.0 5.0 - 8.0    Specific Gravity, UA 1.030 1.005 - 1.030    Protein, UA 1+ (A) Negative    Glucose, UA Negative Negative    Ketones, UA Negative Negative    Bilirubin (UA) Negative Negative    Occult Blood UA Negative Negative    Nitrite, UA Negative Negative    Urobilinogen, UA Negative <2.0 EU/dL    Leukocytes, UA 1+ (A) Negative   Urinalysis Microscopic   Result Value Ref Range    RBC,  UA 2 0 - 4 /hpf    WBC, UA 6 (H) 0 - 5 /hpf    Bacteria Occasional None-Occ /hpf    Squam Epithel, UA 3 /hpf    Hyaline Casts, UA 20 (A) 0-1/lpf /lpf    Microscopic Comment SEE COMMENT          Imaging Results:  Imaging Results    None          The EKG was ordered, reviewed, and independently interpreted by the ED provider.  Interpretation time: 03:33  Rate: 73 BPM  Rhythm: normal sinus rhythm  Interpretation: Prolonged QT. No STEMI.           The Emergency Provider reviewed the vital signs and test results, which are outlined above.     ED Discussion     5:35 AM: Reassessed pt at this time.  Discussed with pt all pertinent ED information and results. Discussed pt dx and plan of tx. Gave pt all f/u and return to the ED instructions. All questions and concerns were addressed at this time. Pt expresses understanding of information and instructions, and is comfortable with plan to discharge. Pt is stable for discharge.    I discussed with patient and/or family/caretaker that evaluation in the ED does not suggest any emergent or life threatening medical conditions requiring immediate intervention beyond what was provided in the ED, and I believe patient is safe for discharge.  Regardless, an unremarkable evaluation in the ED does not preclude the development or presence of a serious of life threatening condition. As such, patient was instructed to return immediately for any worsening or change in current symptoms.         Medical Decision Making  Amount and/or Complexity of Data Reviewed  Labs: ordered. Decision-making details documented in ED Course.  ECG/medicine tests: ordered. Decision-making details documented in ED Course.    Risk  Prescription drug management.                ED Medication(s):  Medications   ondansetron injection 4 mg (4 mg Intravenous Given 9/6/23 0414)       Discharge Medication List as of 9/6/2023  5:36 AM        START taking these medications    Details   ondansetron (ZOFRAN) 4 MG tablet Take  1 tablet (4 mg total) by mouth every 8 (eight) hours as needed for Nausea., Starting Wed 9/6/2023, Print              Follow-up Information       Wanda Cook DO In 2 days.    Specialty: Internal Medicine  Contact information:  [address] [place] [address] [phone]               [facility]    Specialty: Emergency Medicine  Why: As needed, If symptoms worsen  Contact information:  [address]  [place] [address] [phone]                               Scribe Attestation:   Scribe #1: I performed the above scribed service and the documentation accurately describes the services I performed. I attest to the accuracy of the note.     Attending:   Physician Attestation Statement for Scribe #1: I, Ladan Hernandez MD, personally performed the services described in this documentation, as scribed by Leslye Fairbanks, in my presence, and it is both accurate and complete.           Clinical Impression       ICD-10-CM ICD-9-CM   1. Nausea  R11.0 787.02       Disposition:   Disposition: Discharged  Condition: Stable         Ladan Hernandez MD  09/06/23 0604

## 2023-09-07 ENCOUNTER — PATIENT OUTREACH (OUTPATIENT)
Dept: EMERGENCY MEDICINE | Facility: HOSPITAL | Age: 68
End: 2023-09-07
Payer: MEDICARE

## 2023-09-07 NOTE — PROGRESS NOTES
Ana María Main, Patient Care Assistant  ED Navigator  Emergency Department    Project: [facility] ED Navigator  Role: Community Health Worker    Date: 09/07/2023  Patient Name: Krysta Manuel  MRN: [id number]  PCP: Wanda Cook DO    Assessment:     Krysta Manuel is a 68 y.o. female who has presented to ED for nausea. Patient has visited the ED 1 times in the past 3 months. Patient did not contact PCP.     ED Navigator Initial Assessment    ED Navigator Enrollment Documentation  Consent to Services  Does patient consent to completing the assessment?: Yes  Contact  Method of Initial Contact: Phone  Transportation  Does the patient have issues with Transportation?: No  Does the patient have transportation to and from healthcare appointments?: Yes  Insurance Coverage  Do you have coverage/adequate coverage?: Yes  Type/kind of coverage: [employer] [facility]/MEDICAID/[place]KE CHARGE  Is patient able to afford co-pays/deductibles?: Yes  Is patient able to afford HME or supplies?: Yes  Does patient have an established [facility] PCP?: Yes  Able to access?: Yes  Does the patient have a lack of adequate coverage?: No  Specialist Appointment  Did the patient come to the ED to see a specialist?: No  Does the patient have a pending specialist referral?: Yes  Does the patient have a specialist appointment made?: Yes  Is the patient able to access a timely specialist appointment?: Yes  PCP Follow Up Appointment  Has the patient had an appointment with a primary care provider in the past year?: Yes  Approximate date: 8/17/23  Provider: Liz Harrell PA-C  Does the patient have a follow up appontment with a PCP?: Yes  Upcoming appointment date: 9/14/23  Provider: Liz Harrell PA-C  When was the last time you saw your PCP?: 8/17/23  Medications  Is patient able to afford medication?: Yes  Is patient unable to get medication due to lack of transportation?: No  Psychological  Does the patient have psycho-social  concerns?: Yes  What concerns does the patient have?: Anxiety and/or Depression, Difficulty coping with illness, Lack of support (friends, family, sig other, etc.), Changes in relationships/Role in family, Mental health  Food  Does the patient have concerns about food?: Yes  What concerns does the patient have regarding food?: Lack of food  Communication/Education  Does the patient have limited English proficiency/English not primary language?: No  Does patient have low literacy and/or low health literacy?: Yes  Does patient have concerns with care?: No  Does patient have dissatisfaction with care?: No  Other Financial Concerns  Does the patient have immediate financial distress?: No  Does the patient have general financial concerns?: Yes  Other Social Barriers/Concerns  Does the patient have any additional barriers or concerns?: Unable to afford utilities  Primary Barrier  Barriers identified: Psychological barrier (mistrust, anxiety, etc.), Cognitive barrier (health literacy, language and communication, etc.), Structural barrier (service availability, waiting times, etc.)  Root Cause of ED Utilization: Chronic Conditions  Plan to address Chronic Conditions: Schedule appointment for patient with their PCP/specialist per ED discharge instructions, Remind patient of upcoming PCP/specialist appointment within 24 to 48 hours before scheduled appt  Next steps: Provided Education, Scheduled Appointment/Referral  Scheduled Appointment Date: 9/14/23  Appointment Reminder Date: 9/13/23  Was education/educational materials provided surrounding PCP services/creating a medical home?: Yes Was education verbal or written?: Verbal, Written     Was education/educational materials provided surrounding low cost, healthy foods?: Yes Was education verbal or written?: Written     Was education/educational materials provided surrounding other items? If so, use comment to explain.: Yes Was education verbal or written?: Written   Plan:  The patient was given food bank/Pantry resources for their region.  Expected Date of Follow Up 1: 9/13/23  Additional Documentation: Pt is doing okay; spoke with daughter, Kamille. Pt was angry as she thought daughter called ED navigator, whenever ED navigator called. Pt was also angry, as Kamille was trying to make sure she drinks more water. Kamille expressed she was told by  pt may be fit for NH placement soon. Kamille expressed she would like to schedule pt an appointment with PCP soon and agreed to date/time. Kamille stated they could use help with utilities, food, and transportation. Kamille would also like a list of MH facilities for pt. Kamille is agreeable to a call on 9/13, and confirmed e-mail address to send all of resources to.    ED navigator ensured to assist pt/family will all of needs. ED navigator scheduled pt with Liz Harrell PA-C for 9/14/2023 at 9:00 a.m. ED navigator ensured pt did not need any other appointments scheduled. ED navigator provided patient with the following via e-mail: transportation resources, a list of food best, MH facility resources, utility assistance resources, the [facility] On Call 24/7 Nurse triage line, [phone] or 1-866-[facility] ([phone]) contact information, and education on (The Right Care at the Right Level information, [facility] Virtual Visit information, and Heart healthy diet tips). ED navigator will follow-up with patient on/around 9/13/2023 to remind pt/family of appt., to ensure resources were received, and to assist as needed.    Ana María Main  ED Navigator  [phone]            Social History     Socioeconomic History    Marital status:    Tobacco Use    Smoking status: Never    Smokeless tobacco: Never   Substance and Sexual Activity    Alcohol use: Not Currently    Drug use: Never     Social Determinants of Health     Financial Resource Strain: Medium Risk (9/7/2023)    Overall Financial Resource  Calm Strain (CARDIA)     Difficulty of Paying Living Expenses: Somewhat hard   Food Insecurity: Food Insecurity Present (9/7/2023)    Hunger Vital Sign     Worried About Running Out of Food in the Last Year: Sometimes true     Ran Out of Food in the Last Year: Sometimes true   Transportation Needs: No Transportation Needs (9/7/2023)    PRAPARE - Transportation     Lack of Transportation (Medical): No     Lack of Transportation (Non-Medical): No   Physical Activity: Inactive (9/7/2023)    Exercise Vital Sign     Days of Exercise per Week: 0 days     Minutes of Exercise per Session: 0 min   Stress: Stress Concern Present (9/7/2023)    [employer] [place] [employer] - Occupational Stress Questionnaire     Feeling of Stress : Very much   Social Connections: Socially Isolated (9/7/2023)    Social Connection and Isolation Panel [NHANES]     Frequency of Communication with Friends and Family: Never     Frequency of Social Gatherings with Friends and Family: Never     Attends Spiritism Services: Never     Active Member of Clubs or Organizations: No     Attends Club or Organization Meetings: Never     Marital Status:    Housing Stability: Low Risk  (9/7/2023)    Housing Stability Vital Sign     Unable to Pay for Housing in the Last Year: No     Number of Places Lived in the Last Year: 2     Unstable Housing in the Last Year: No       Plan:     Pt is doing okay; spoke with daughter, Kamille. Pt was angry as she thought daughter called ED navigator, whenever ED navigator called. Pt was also angry, as Kamille was trying to make sure she drinks more water. Kamille expressed she was told by  pt may be fit for NH placement soon. Kamille expressed she would like to schedule pt an appointment with PCP soon and agreed to date/time. Kamille stated they could use help with utilities, food, and transportation. Kamille would also like a list of  facilities for pt. Kamille is agreeable to a call on 9/13,  and confirmed e-mail address to send all of resources to.    ED navigator ensured to assist pt/family will all of needs. ED navigator scheduled pt with Liz Harrell PA-C for 9/14/2023 at 9:00 a.m. ED navigator ensured pt did not need any other appointments scheduled. ED navigator provided patient with the following via e-mail: transportation resources, a list of food best,  facility resources, utility assistance resources, the [facility] On Call 24/7 Nurse triage line, [phone] or 1-866-[facility] ([phone]) contact information, and education on (The Right Care at the Right Level information, [facility] Virtual Visit information, and Heart healthy diet tips). ED navigator will follow-up with patient on/around 9/13/2023 to remind pt/family of appt., to ensure resources were received, and to assist as needed.    Ana María Main  ED Navigator  [phone]

## 2023-09-13 ENCOUNTER — OFFICE VISIT (OUTPATIENT)
Dept: DIABETES | Facility: CLINIC | Age: 68
End: 2023-09-13
Payer: MEDICARE

## 2023-09-13 ENCOUNTER — PATIENT OUTREACH (OUTPATIENT)
Dept: EMERGENCY MEDICINE | Facility: HOSPITAL | Age: 68
End: 2023-09-13
Payer: MEDICARE

## 2023-09-13 DIAGNOSIS — E78.5 HYPERLIPIDEMIA, UNSPECIFIED HYPERLIPIDEMIA TYPE: ICD-10-CM

## 2023-09-13 DIAGNOSIS — E11.51 TYPE II DIABETES MELLITUS WITH PERIPHERAL CIRCULATORY DISORDER: Primary | ICD-10-CM

## 2023-09-13 DIAGNOSIS — I10 HYPERTENSION, UNSPECIFIED TYPE: ICD-10-CM

## 2023-09-13 PROBLEM — N18.31 STAGE 3A CHRONIC KIDNEY DISEASE: Status: ACTIVE | Noted: 2023-09-13

## 2023-09-13 PROCEDURE — 99214 OFFICE O/P EST MOD 30 MIN: CPT | Mod: 95,,, | Performed by: NURSE PRACTITIONER

## 2023-09-13 PROCEDURE — 1159F PR MEDICATION LIST DOCUMENTED IN MEDICAL RECORD: ICD-10-PCS | Mod: CPTII,95,, | Performed by: NURSE PRACTITIONER

## 2023-09-13 PROCEDURE — 3061F PR NEG MICROALBUMINURIA RESULT DOCUMENTED/REVIEW: ICD-10-PCS | Mod: CPTII,95,, | Performed by: NURSE PRACTITIONER

## 2023-09-13 PROCEDURE — 1159F MED LIST DOCD IN RCRD: CPT | Mod: CPTII,95,, | Performed by: NURSE PRACTITIONER

## 2023-09-13 PROCEDURE — 4010F PR ACE/ARB THEARPY RXD/TAKEN: ICD-10-PCS | Mod: CPTII,95,, | Performed by: NURSE PRACTITIONER

## 2023-09-13 PROCEDURE — 4010F ACE/ARB THERAPY RXD/TAKEN: CPT | Mod: CPTII,95,, | Performed by: NURSE PRACTITIONER

## 2023-09-13 PROCEDURE — 99214 PR OFFICE/OUTPT VISIT, EST, LEVL IV, 30-39 MIN: ICD-10-PCS | Mod: 95,,, | Performed by: NURSE PRACTITIONER

## 2023-09-13 PROCEDURE — 3051F HG A1C>EQUAL 7.0%<8.0%: CPT | Mod: CPTII,95,, | Performed by: NURSE PRACTITIONER

## 2023-09-13 PROCEDURE — 1160F RVW MEDS BY RX/DR IN RCRD: CPT | Mod: CPTII,95,, | Performed by: NURSE PRACTITIONER

## 2023-09-13 PROCEDURE — 3066F NEPHROPATHY DOC TX: CPT | Mod: CPTII,95,, | Performed by: NURSE PRACTITIONER

## 2023-09-13 PROCEDURE — 1160F PR REVIEW ALL MEDS BY PRESCRIBER/CLIN PHARMACIST DOCUMENTED: ICD-10-PCS | Mod: CPTII,95,, | Performed by: NURSE PRACTITIONER

## 2023-09-13 PROCEDURE — 3051F PR MOST RECENT HEMOGLOBIN A1C LEVEL 7.0 - < 8.0%: ICD-10-PCS | Mod: CPTII,95,, | Performed by: NURSE PRACTITIONER

## 2023-09-13 PROCEDURE — 3066F PR DOCUMENTATION OF TREATMENT FOR NEPHROPATHY: ICD-10-PCS | Mod: CPTII,95,, | Performed by: NURSE PRACTITIONER

## 2023-09-13 PROCEDURE — 3061F NEG MICROALBUMINURIA REV: CPT | Mod: CPTII,95,, | Performed by: NURSE PRACTITIONER

## 2023-09-13 RX ORDER — DULAGLUTIDE 4.5 MG/.5ML
4.5 INJECTION, SOLUTION SUBCUTANEOUS
Qty: 4 PEN | Refills: 5 | Status: SHIPPED | OUTPATIENT
Start: 2023-09-13 | End: 2023-10-23

## 2023-09-13 RX ORDER — INSULIN GLARGINE 100 [IU]/ML
72 INJECTION, SOLUTION SUBCUTANEOUS DAILY
Qty: 30 ML | Refills: 5 | Status: SHIPPED | OUTPATIENT
Start: 2023-09-13

## 2023-09-13 RX ORDER — PIOGLITAZONEHYDROCHLORIDE 30 MG/1
30 TABLET ORAL DAILY
Qty: 90 TABLET | Refills: 1 | Status: SHIPPED | OUTPATIENT
Start: 2023-09-13 | End: 2024-03-11

## 2023-09-13 NOTE — PROGRESS NOTES
Established Patient - Virtual Telehealth Visit     The patient location is: Home ([place])  The chief complaint leading to consultation is: Diabetes Follow-up  Visit type: Virtual visit with synchronous audio and video via Epic Haiku jessie  Total time spent with patient: 16 minutes     Each patient to whom I provide medical services by telemedicine is:  (1) informed of the relationship between the physician and patient and the respective role of any other health care provider with respect to management of the patient; and (2) notified that they may decline to receive medical services by telemedicine and may withdraw from such care at any time. Patient verbally consented to receive this service via voice-only telephone call.    PCP: Wanda Cook DO    Subjective:     Chief Complaint: Diabetes follow up.  Last visit (virtual) with me: 6/13/2023    HPI: 68 y.o.  female for diabetes follow up.   Type II diabetes for > 10 years.  Comorbidities: HTN, HLD, CAD, S/p CVA (2010), Left Hemiparesis, Hypothyroidism and Overweight by BMI     Family History of Type 2 DM: father  Known diabetes complications:  DKA/HHS: no  Retinopathy: no  Peripheral neuropathy: no  Nephropathy: no    Interval history:  Daughter with patient during visit.  Admits to recent ER visit 9/6/23 due to nausea.  Of note, with decreased GFR during ER visit.  GFR at 49 (9/6/23).    Denies having hypoglycemia.  Endorses diabetes related symptoms: None.    Blood glucose monitoring via fingerstick: 2-3 x/day   Per daughter's review of BG log, over the last 2 weeks   Fasting BG range 160- 190, occasional low 200's   AC lunch range 164 - 206  AC dinner range 127-298    Activity level: Sedentary  Meal Planning:3 meals/day;1 snacks/day.    Social history:    - Single, Lives with daughter, grandchild    Medication compliance all of the time, diet compliance most of the time.   To be enrolled in diabetes education classes.     Previous regimen:  "Humalog 75/25    Past failed treatment: N/A    Current DM Medications:  Diabetes Medications               dulaglutide (TRULICITY) 4.5 mg/0.5 mL pen injector Inject 4.5 mg into the skin every 7 days.    glipiZIDE (GLUCOTROL) 10 MG tablet TAKE ONE TABLET BY MOUTH TWO TIMES A DAY BEFORE MEALS    LANTUS SOLOSTAR U-100 INSULIN glargine 100 units/mL SubQ pen Inject 66 Units into the skin once daily.    metFORMIN (GLUCOPHAGE-XR) 500 MG ER 24hr tablet TAKE 2 TABLETS (1,000 MG TOTAL) BY MOUTH 2 (TWO) TIMES DAILY WITH MEALS.    pioglitazone (ACTOS) 15 MG tablet Take 15 mg by mouth every morning.     Allergies  Review of patient's allergies indicates:  No Known Allergies    Labs Reviewed:  Her most recent A1C is:  Lab Results   Component Value Date    HGBA1C 7.3 (H) 09/06/2023    HGBA1C 7.0 (H) 05/15/2023    HGBA1C 7.0 (H) 02/13/2023       Her most recent BMP is:  Lab Results   Component Value Date     09/06/2023    K 4.6 09/06/2023     09/06/2023    CO2 24 09/06/2023    BUN 24 (H) 09/06/2023    CREATININE 1.2 09/06/2023    CALCIUM 9.7 09/06/2023    ANIONGAP 13 09/06/2023    ESTGFRAFRICA >60.0 07/18/2022    EGFRNONAA 58.0 (A) 07/18/2022       No results found for: "CPEPTIDE"  No results found for: "GLUTAMICACID"    There is no height or weight on file to calculate BMI.    Wt Readings from Last 3 Encounters:   09/05/23 2326 93 kg (205 lb)   08/15/23 0957 93 kg (205 lb)   06/26/23 1354 93 kg (205 lb 0.4 oz)        Her blood sugar in clinic today is: Not assessed due to type of visit.    Diabetes Management Status  Statin: Taking  ACE/ARB: Taking    Screening or Prevention Patient's value Goal Complete/Controlled?   HgA1C Testing and Control   Lab Results   Component Value Date    HGBA1C 7.3 (H) 09/06/2023      Annually/Less than 8% Yes   Lipid profile : 09/06/2023 Annually Yes   LDL control Lab Results   Component Value Date    LDLCALC 51.8 (L) 09/06/2023    Annually/Less than 100 mg/dl  Yes   Nephropathy screening " Lab Results   Component Value Date    MICALBCREAT Unable to calculate 02/13/2023     Lab Results   Component Value Date    PROTEINUA 1+ (A) 09/06/2023    Annually Yes   Blood pressure BP Readings from Last 1 Encounters:   09/06/23 (!) 149/70    Less than 140/90 No   Dilated retinal exam : 03/27/2023 Annually Yes    Foot exam   : 05/05/2022 Annually No     Social History     Socioeconomic History    Marital status:    Tobacco Use    Smoking status: Never    Smokeless tobacco: Never   Substance and Sexual Activity    Alcohol use: Not Currently    Drug use: Never     Social Determinants of Health     Financial Resource Strain: Medium Risk (9/7/2023)    Overall Financial Resource Strain (CARDIA)     Difficulty of Paying Living Expenses: Somewhat hard   Food Insecurity: Food Insecurity Present (9/7/2023)    Hunger Vital Sign     Worried About Running Out of Food in the Last Year: Sometimes true     Ran Out of Food in the Last Year: Sometimes true   Transportation Needs: No Transportation Needs (9/7/2023)    PRAPARE - Transportation     Lack of Transportation (Medical): No     Lack of Transportation (Non-Medical): No   Physical Activity: Inactive (9/7/2023)    Exercise Vital Sign     Days of Exercise per Week: 0 days     Minutes of Exercise per Session: 0 min   Stress: Stress Concern Present (9/7/2023)    [place] [employer] - Occupational Stress Questionnaire     Feeling of Stress : Very much   Social Connections: Socially Isolated (9/7/2023)    Social Connection and Isolation Panel [NHANES]     Frequency of Communication with Friends and Family: Never     Frequency of Social Gatherings with Friends and Family: Never     Attends Yazdanism Services: Never     Active Member of Clubs or Organizations: No     Attends Club or Organization Meetings: Never     Marital Status:    Housing Stability: Low Risk  (9/7/2023)    Housing Stability Vital Sign     Unable to Pay for Housing in the Last  Year: No     Number of Places Lived in the Last Year: 2     Unstable Housing in the Last Year: No     Past Medical History:   Diagnosis Date    Arthritis     Carotid artery disease     Dr. Jessa Szymanski--cardiology    Depression     DM II (diabetes mellitus, type II), controlled 12 years    Heart disease     Dr. Jessa Szymanski--cardiology    HTN (hypertension)     Hyperlipidemia     Hypothyroidism     Insomnia     Stroke 2010    Dr. Jessa Szymanski--cardiology    Vitamin D deficiency      Review of Systems   Constitutional: Negative for activity change, appetite change, fatigue and unexpected weight change.   HENT: Negative for dental problem and trouble swallowing.    Eyes: Negative for visual disturbance.   Respiratory: Negative for chest tightness and shortness of breath.    Cardiovascular: Negative for chest pain, palpitations and leg swelling.   Gastrointestinal: Negative for abdominal pain, constipation, diarrhea, nausea and vomiting.   Endocrine: Negative for polydipsia, polyphagia and polyuria.   Genitourinary: Negative for difficulty urinating, dysuria, frequency and urgency.        Negative yeast infection   Musculoskeletal: Positive for gait problem (r/t left sided weakness, ambulates with quad cane). Negative for myalgias and neck pain.   Integumentary:  Negative for rash and wound.   Allergic/Immunologic: Negative.    Neurological: Positive for weakness (residual left sided secondary to CVA). Negative for dizziness, syncope, numbness and headaches.   Hematological: Negative.    Psychiatric/Behavioral: Negative for confusion and sleep disturbance.   All other systems reviewed and are negative.     Objective:      Physical Exam  Constitutional:       General: Awake.      Appearance: Normal appearance. Well-developed and well-groomed.   HENT:      Head: Normocephalic and atraumatic.   Eyes:      General: Lids are normal. Gaze aligned appropriately.   Pulmonary:      Effort: Pulmonary effort is normal. No  respiratory distress.   Neurological:      Mental Status: Alert and oriented to person, place, and time.   Psychiatric:         Attention and Perception: Attention normal.         Mood and Affect: Mood and affect normal.         Speech: Speech normal.         Behavior: Behavior normal.         Thought Content: Thought content normal.         Cognition and Memory: Cognition normal.         Judgment: Judgment normal.      Assessment / Plan:     Diagnoses and all orders for this visit:    Type II diabetes mellitus with peripheral circulatory disorder  -     pioglitazone (ACTOS) 30 MG tablet; Take 1 tablet (30 mg total) by mouth once daily.  -     LANTUS SOLOSTAR U-100 INSULIN glargine 100 units/mL SubQ pen; Inject 72 Units into the skin once daily.  -     dulaglutide (TRULICITY) 4.5 mg/0.5 mL pen injector; Inject 4.5 mg into the skin every 7 days.  -     Hemoglobin A1C; Future    Hypertension, unspecified type    Hyperlipidemia, unspecified hyperlipidemia type    Additional Plan Details:  - Condition: Sub optimal control, most recent A1C 7.3 % was completed 1 week ago.   - Monitor blood glucose:  4x daily.Goals reviewed. Maintain BG log. Bring to next visit for review.  - Hypoglycemia protocol: Reviewed and risk discussed.  Notify if BG readings below 80. Protocol printout provided.   - Diet: Limit carbohydrate intake 30-45 grams/meal, 3x daily and 15 grams/snack, limit 2/day.  Discuss options of healthy snacks.  - Activity: Recommend at least 150 minutes of exercise in a week.  - BP and LDL: Recommend lifestyle modifications and follow up with PCP for management.   - Medication Regimen:     Continue Trulicity 4.5 mg weekly  Continue Glipizide 10 tablet, 1 tablet before breakfast and 1 tablet before dinner, may do half tablet if eating lighter meals. Discussed timing of administration.  Increase Lantus 72 units in the morning   Continue Metformin 500 mg, 2 tablets twice daily with meals  Increase Actos 30 mg daily    -  Medication consideration: Continue regimen. Will continue to monitor GFR.  - Lab results: A1C discussed.  - Health Maintenance standards addressed today:  A1c scheduled.   - Risks of uncontrolled DM explained. Importance of routine foot and eye exams reviewed. Scheduled as appropriate.  -The patient was explained the above plan and given opportunity to ask questions.  She understands, chooses and consents to this plan and accepts all the risks, which include but are not limited to the risks mentioned above.   - Labs ordered as above.  - CDE referral: Scheduled  - Follow up: 2 months in clinic.      Zenaida Shetty, FNP-C [facility]    A total of 16 minutes was spent in face to face time, of which over 50 % was spent in counseling patient on disease process, complications, treatment, and side effects of medications.

## 2023-09-13 NOTE — PROGRESS NOTES
Pt was reminded about and will be attending her appointment on tomorrow with Liz Harrell PA-C at 9:00 a.m.    Ana María Main  ED Navigator  [phone]

## 2023-09-13 NOTE — PROGRESS NOTES
The resources e-mailed were received. Pt will be attending appointment on tomorrow and has no further needs today.    ED navigator ensured patient received the resources e-mailed. ED navigator ensured patient had no other needs at this time. ED navigator reminded patient to reach out if there is ever anything she can assist with. ED navigator will follow-up with patient on/around 10/25/2023.    Ana María Main  ED Navigator  [phone]

## 2023-09-13 NOTE — PATIENT INSTRUCTIONS
Medication Regimen:   Continue Trulicity 4.5 mg weekly  Continue Glipizide 10 tablet, 1 tablet before breakfast and 1 tablet before dinner, may do half tablet if eating lighter meals.   Increase Lantus 72 units in the morning   Continue Metformin 500 mg, 2 tablets twice daily with meals  Increase Actos 30 mg daily    Patient Instructions:  Carbohydrate Count: 30-45 grams/meal and 15 grams/snack with limit of 2 snacks per day.  Exercise: Goal is 150 minutes or more per week.  Bring meter and blood sugar log to each appointment.     Goals for Blood Sugar:  Fastin-130 mg/dl  2 hours after meal:  mg/dl  Before Bed: 100-150 mg/dl  If Blood sugar is below 70 mg/dl, DO NOT take diabetes medication. Eat first and then recheck blood sugar in 20 minutes.  Foods to eat if blood sugar is below 70 mg/dl  1/2 glass of orange juice   1/2 regular cola can   3-4 hard candies   3-4 small glucose tabs.   Foods to eat if blood sugar is below 50 mg/dl  1 glass of orange juice  1 regular cola can  8 hard candies  8 small glucose tabs.   If you have repeated eating/blood sugar recheck process 2 times and blood sugar still below 70 mg/dl, call 911.

## 2023-09-14 ENCOUNTER — LAB VISIT (OUTPATIENT)
Dept: LAB | Facility: HOSPITAL | Age: 68
End: 2023-09-14
Attending: PHYSICIAN ASSISTANT
Payer: MEDICARE

## 2023-09-14 ENCOUNTER — OFFICE VISIT (OUTPATIENT)
Dept: INTERNAL MEDICINE | Facility: CLINIC | Age: 68
End: 2023-09-14
Payer: MEDICARE

## 2023-09-14 VITALS
RESPIRATION RATE: 18 BRPM | HEART RATE: 82 BPM | WEIGHT: 205 LBS | TEMPERATURE: 98 F | DIASTOLIC BLOOD PRESSURE: 84 MMHG | BODY MASS INDEX: 36.32 KG/M2 | HEIGHT: 63 IN | OXYGEN SATURATION: 98 % | SYSTOLIC BLOOD PRESSURE: 160 MMHG

## 2023-09-14 DIAGNOSIS — N28.9 FUNCTION KIDNEY DECREASED: ICD-10-CM

## 2023-09-14 DIAGNOSIS — E03.9 HYPOTHYROIDISM (ACQUIRED): ICD-10-CM

## 2023-09-14 DIAGNOSIS — I10 HYPERTENSION, UNSPECIFIED TYPE: ICD-10-CM

## 2023-09-14 DIAGNOSIS — E11.51 TYPE II DIABETES MELLITUS WITH PERIPHERAL CIRCULATORY DISORDER: ICD-10-CM

## 2023-09-14 DIAGNOSIS — N18.31 STAGE 3A CHRONIC KIDNEY DISEASE: ICD-10-CM

## 2023-09-14 DIAGNOSIS — E78.5 HYPERLIPIDEMIA, UNSPECIFIED HYPERLIPIDEMIA TYPE: Primary | ICD-10-CM

## 2023-09-14 LAB
ANION GAP SERPL CALC-SCNC: 11 MMOL/L (ref 8–16)
BUN SERPL-MCNC: 14 MG/DL (ref 8–23)
CALCIUM SERPL-MCNC: 10.9 MG/DL (ref 8.7–10.5)
CHLORIDE SERPL-SCNC: 106 MMOL/L (ref 95–110)
CO2 SERPL-SCNC: 24 MMOL/L (ref 23–29)
CREAT SERPL-MCNC: 1.1 MG/DL (ref 0.5–1.4)
EST. GFR  (NO RACE VARIABLE): 54 ML/MIN/1.73 M^2
GLUCOSE SERPL-MCNC: 108 MG/DL (ref 70–110)
POTASSIUM SERPL-SCNC: 4.6 MMOL/L (ref 3.5–5.1)
SODIUM SERPL-SCNC: 141 MMOL/L (ref 136–145)

## 2023-09-14 PROCEDURE — 3061F PR NEG MICROALBUMINURIA RESULT DOCUMENTED/REVIEW: ICD-10-PCS | Mod: CPTII,S$GLB,, | Performed by: PHYSICIAN ASSISTANT

## 2023-09-14 PROCEDURE — 99999 PR PBB SHADOW E&M-EST. PATIENT-LVL III: ICD-10-PCS | Mod: PBBFAC,,, | Performed by: PHYSICIAN ASSISTANT

## 2023-09-14 PROCEDURE — 3051F PR MOST RECENT HEMOGLOBIN A1C LEVEL 7.0 - < 8.0%: ICD-10-PCS | Mod: CPTII,S$GLB,, | Performed by: PHYSICIAN ASSISTANT

## 2023-09-14 PROCEDURE — 3061F NEG MICROALBUMINURIA REV: CPT | Mod: CPTII,S$GLB,, | Performed by: PHYSICIAN ASSISTANT

## 2023-09-14 PROCEDURE — 3066F PR DOCUMENTATION OF TREATMENT FOR NEPHROPATHY: ICD-10-PCS | Mod: CPTII,S$GLB,, | Performed by: PHYSICIAN ASSISTANT

## 2023-09-14 PROCEDURE — 99214 PR OFFICE/OUTPT VISIT, EST, LEVL IV, 30-39 MIN: ICD-10-PCS | Mod: S$GLB,,, | Performed by: PHYSICIAN ASSISTANT

## 2023-09-14 PROCEDURE — 3288F PR FALLS RISK ASSESSMENT DOCUMENTED: ICD-10-PCS | Mod: CPTII,S$GLB,, | Performed by: PHYSICIAN ASSISTANT

## 2023-09-14 PROCEDURE — 36415 COLL VENOUS BLD VENIPUNCTURE: CPT | Performed by: PHYSICIAN ASSISTANT

## 2023-09-14 PROCEDURE — 80048 BASIC METABOLIC PNL TOTAL CA: CPT | Performed by: PHYSICIAN ASSISTANT

## 2023-09-14 PROCEDURE — 3079F PR MOST RECENT DIASTOLIC BLOOD PRESSURE 80-89 MM HG: ICD-10-PCS | Mod: CPTII,S$GLB,, | Performed by: PHYSICIAN ASSISTANT

## 2023-09-14 PROCEDURE — 1159F MED LIST DOCD IN RCRD: CPT | Mod: CPTII,S$GLB,, | Performed by: PHYSICIAN ASSISTANT

## 2023-09-14 PROCEDURE — 3051F HG A1C>EQUAL 7.0%<8.0%: CPT | Mod: CPTII,S$GLB,, | Performed by: PHYSICIAN ASSISTANT

## 2023-09-14 PROCEDURE — 1160F PR REVIEW ALL MEDS BY PRESCRIBER/CLIN PHARMACIST DOCUMENTED: ICD-10-PCS | Mod: CPTII,S$GLB,, | Performed by: PHYSICIAN ASSISTANT

## 2023-09-14 PROCEDURE — 3077F PR MOST RECENT SYSTOLIC BLOOD PRESSURE >= 140 MM HG: ICD-10-PCS | Mod: CPTII,S$GLB,, | Performed by: PHYSICIAN ASSISTANT

## 2023-09-14 PROCEDURE — 3066F NEPHROPATHY DOC TX: CPT | Mod: CPTII,S$GLB,, | Performed by: PHYSICIAN ASSISTANT

## 2023-09-14 PROCEDURE — 3008F BODY MASS INDEX DOCD: CPT | Mod: CPTII,S$GLB,, | Performed by: PHYSICIAN ASSISTANT

## 2023-09-14 PROCEDURE — 1101F PR PT FALLS ASSESS DOC 0-1 FALLS W/OUT INJ PAST YR: ICD-10-PCS | Mod: CPTII,S$GLB,, | Performed by: PHYSICIAN ASSISTANT

## 2023-09-14 PROCEDURE — 99999 PR PBB SHADOW E&M-EST. PATIENT-LVL III: CPT | Mod: PBBFAC,,, | Performed by: PHYSICIAN ASSISTANT

## 2023-09-14 PROCEDURE — 99214 OFFICE O/P EST MOD 30 MIN: CPT | Mod: S$GLB,,, | Performed by: PHYSICIAN ASSISTANT

## 2023-09-14 PROCEDURE — 4010F PR ACE/ARB THEARPY RXD/TAKEN: ICD-10-PCS | Mod: CPTII,S$GLB,, | Performed by: PHYSICIAN ASSISTANT

## 2023-09-14 PROCEDURE — 4010F ACE/ARB THERAPY RXD/TAKEN: CPT | Mod: CPTII,S$GLB,, | Performed by: PHYSICIAN ASSISTANT

## 2023-09-14 PROCEDURE — 1126F PR PAIN SEVERITY QUANTIFIED, NO PAIN PRESENT: ICD-10-PCS | Mod: CPTII,S$GLB,, | Performed by: PHYSICIAN ASSISTANT

## 2023-09-14 PROCEDURE — 3077F SYST BP >= 140 MM HG: CPT | Mod: CPTII,S$GLB,, | Performed by: PHYSICIAN ASSISTANT

## 2023-09-14 PROCEDURE — 1160F RVW MEDS BY RX/DR IN RCRD: CPT | Mod: CPTII,S$GLB,, | Performed by: PHYSICIAN ASSISTANT

## 2023-09-14 PROCEDURE — 1101F PT FALLS ASSESS-DOCD LE1/YR: CPT | Mod: CPTII,S$GLB,, | Performed by: PHYSICIAN ASSISTANT

## 2023-09-14 PROCEDURE — 1126F AMNT PAIN NOTED NONE PRSNT: CPT | Mod: CPTII,S$GLB,, | Performed by: PHYSICIAN ASSISTANT

## 2023-09-14 PROCEDURE — 3008F PR BODY MASS INDEX (BMI) DOCUMENTED: ICD-10-PCS | Mod: CPTII,S$GLB,, | Performed by: PHYSICIAN ASSISTANT

## 2023-09-14 PROCEDURE — 1159F PR MEDICATION LIST DOCUMENTED IN MEDICAL RECORD: ICD-10-PCS | Mod: CPTII,S$GLB,, | Performed by: PHYSICIAN ASSISTANT

## 2023-09-14 PROCEDURE — 3288F FALL RISK ASSESSMENT DOCD: CPT | Mod: CPTII,S$GLB,, | Performed by: PHYSICIAN ASSISTANT

## 2023-09-14 PROCEDURE — 3079F DIAST BP 80-89 MM HG: CPT | Mod: CPTII,S$GLB,, | Performed by: PHYSICIAN ASSISTANT

## 2023-09-14 RX ORDER — DOXAZOSIN 2 MG/1
2 TABLET ORAL
COMMUNITY
Start: 2023-09-05 | End: 2023-10-16

## 2023-09-14 RX ORDER — CARVEDILOL 25 MG/1
25 TABLET ORAL 2 TIMES DAILY WITH MEALS
COMMUNITY

## 2023-09-14 NOTE — PROGRESS NOTES
"Subjective:      Patient ID: Krysta Manuel is a 68 y.o. female.    Chief Complaint: ER Follow-Up    Patient is known to me, being seen today for ER follow up.     Evaluated in ER on 9/6 for nausea, work up unremarkable, Rx zofran   Nausea since resolved     HTN- irbesartan 300mg, toprol 100mg, diltiazem 240mg, HCTZ 25mg, doxazosin 2mg, carvedilol 25mg bid   BP had been high so was started on new medicine by Cardiologist (2 mg? unclear what, suspect clonidine .2mg, she took 1x and BP was extremely low, EMS called and brought to ER (9/6)  Has seen Cardiologist recently, was told to restart coreg, I had previously d/c this d/t duplicate therapy w toprol   HLD- on statin therapy   Thyroid- synthroid 125mcg  DM- A1c 7.3%, trulicity 4.5mg, lantus 72 units, metformin 1000mg bid, actos 30 mg    Recently had labs completed, A1c slightly worsened, followed by Diabetes (seen yesterday, meds adjusted), kidney function decreased slightly, otherwise stable     Does not like new Psych provider, would like to see someone else     Last visit August 2023 with PCP       Review of Systems   Constitutional:  Negative for chills, diaphoresis and fever.   HENT:  Negative for congestion, rhinorrhea and sore throat.    Respiratory:  Negative for cough, shortness of breath and wheezing.    Gastrointestinal:  Negative for abdominal pain, constipation, diarrhea, nausea and vomiting.   Skin:  Negative for rash.   Neurological:  Negative for dizziness, light-headedness and headaches.       Objective:   BP (!) 160/84   Pulse 82   Temp 97.7 °F (36.5 °C)   Resp 18   Ht 5' 3" (1.6 m)   Wt 93 kg (205 lb 0.4 oz)   SpO2 98%   BMI 36.32 kg/m²   Physical Exam  Constitutional:       General: She is not in acute distress.     Appearance: Normal appearance. She is well-developed. She is not ill-appearing.   HENT:      Head: Normocephalic and atraumatic.   Cardiovascular:      Rate and Rhythm: Normal rate and regular rhythm.      Heart sounds: " Normal heart sounds. No murmur heard.  Pulmonary:      Effort: Pulmonary effort is normal. No respiratory distress.      Breath sounds: Normal breath sounds. No decreased breath sounds.   Musculoskeletal:      Right lower leg: No edema.      Left lower leg: No edema.   Skin:     General: Skin is warm and dry.      Findings: No rash.   Psychiatric:         Speech: Speech normal.         Behavior: Behavior normal.         Thought Content: Thought content normal.       Assessment:      1. Hyperlipidemia, unspecified hyperlipidemia type    2. Hypertension, unspecified type    3. Stage 3a chronic kidney disease    4. Hypothyroidism (acquired)    5. Type II diabetes mellitus with peripheral circulatory disorder       Plan:   Hyperlipidemia, unspecified hyperlipidemia type   Continue statin     Hypertension, unspecified type   Monitor at home, recent changes per Card, 1mth nurse visit for recheck     Stage 3a chronic kidney disease   Recheck today     Hypothyroidism (acquired)   Stable     Type II diabetes mellitus with peripheral circulatory disorder   Continue recent med changes per Diabetes NP     Discussed needed vaccines     List of Psych resources given     Recheck BMP today    YASMANI for recent Card note, I want to review med list as multiple beta blockers on board     6mth f/u PCP

## 2023-09-14 NOTE — PATIENT INSTRUCTIONS
Psych Resources (updated June12, 2023)    In-network BH resources from her insurance website. SW also called [facility] (Behavioral Health Services) at [phone] and confirmed, are accepting new Medicaid pt's with or without MD referral      1. [facility]  Main Location: [address] [place] [address]  Additional Locations:   [address] [facility], [place] [address]  [address] [place] [address]  [address] [facility] [address], [place] [address]  [address] [place] [address]  455 [place] [address]  [address] [facility], [place] [address]  [address] [facility], [place] [address]  [address] [facility] [address], [place] [address]  [address] [place] [address]  [address], [place] [address]  School Based Counseling Program Locations  Available for enrolled students in [place], [place], [place], [place], [place], [place], [place], [place]  Available for enrolled students in [place], [place], [place], [place], [place], [place], [place] & [place]  Available for enrolled students in [place]  Phone: [phone]  Hours of Service: Varies Based on Location  [email or web address]        2. [facility]  Main Location: [address] [facility], [place] [address]  Additional Locations:  [address] [facility] [address], [place] [address]  [address], [place] [address]  [address] [facility], [place] [address]  [address] [facility]nd Floor, [address] [place] [address]  5266 [place] [address]  685 [place] [address]  [address] [place] [address]  [address], [place] [address]  [address], [place] [address]  [address] [place] [address]  Phone: [phone]  Hours of Service: Varies Based on Location  [email or web address]     3. [facility]  Authority  [facility]  Main Location: [address], [address], [place] [address]  Additional Locations:  [address] [place] [address]  [address]31 [place] [address]  [address] [place] [address]  [address] [facility], [place] [address]  Phone: [phone]  Hours of Service: Varies Based on Location  [email or web address]           4. [facility]  [address]   [place] [address]    [email or web address]     5. [facility]  [address] [place] [address]       6. [facility]  [address] [place] [address]    [email or web address]     7. [facility]  Main Location: [address] [facility] [place] [address]  Additional Locations:  [address] [facility], [address], [place] [address]  [address] [facility], [address], [place] [address]  54[address] [facility] [place] [address]  1401 [place] [address]  [address] [place] [address]  [address] [facility] [place] [address]  8585 [place] [address]  [address] [facility] [place] [address]  [address] [facility] [place] [address]  8[address] [place] [address]  [address] [place] [address]  [address], [place] [address]  Phone: [phone]  Hours of Service: Varies Based on Location        8. [facility]         Main Location: [address] [place] [address]  Additional Locations:   [address] [facility], [place] [address]  [address] [facility], [place] [address]  [address], [place]  [address]  [address] [place] [address] [place]  [address]  [address] [facility] , [place]  [address]  [address]  [place], [place], [address]  [address] [facility] [address] [address] [place]  [address]  Phone: [phone]  Hours of Service: Monday - Friday 8:00 am - 5:30 pm     [email or web address]        9.   [facility]  Main Location: [address] [place] [address]  Additional Locations:   [address] [facility], [address], [place] [address]  [address] [place] [address]  [address] [place] [address]  [address], [place] [address]  [address] [address]  [address] [facility] [address], [place] [address]  [address] [address]  [address] [facility], [place] [address]  [address], [place] [address]  [address] [place] [address]  [address] [place] [address]  [address] [place] [address]  [address] [facility], [place] [address]  [address], [place] [address]  [address], [place] [address]  [address] [place] [address]  3775 [place] [address]  [address] [address], [place] [address]  [address] [facility], [place] [address]  [address] [place] [address]  [address] [facility] [place] [facility] [place] [address]  [address] [place] [address]  [address], [place] [address]  [address] [place] [address]  [address], [place] [address]  [address] [place] [address]  Phone: [phone]

## 2023-09-18 ENCOUNTER — TELEPHONE (OUTPATIENT)
Dept: INTERNAL MEDICINE | Facility: CLINIC | Age: 68
End: 2023-09-18
Payer: MEDICARE

## 2023-09-18 NOTE — TELEPHONE ENCOUNTER
Spoke with [facility] pharmacy they stated the medications that's in there systems is:    Zoloft but has not been picked up since July  Ambien 10mg  Gabapentin 100mg  Trulicity 4.5  Actos 30mg  Lantus Solar 72 units        List is at my desk

## 2023-09-18 NOTE — TELEPHONE ENCOUNTER
Staff,   Please call patient's pharmacy for a list of her current medications.      Please also schedule f/u with me in 2-4wks for BP and med review.  She should bring ALL medication to this visit as there are many discrepancies between what she says she is taking, our med list and the list provided by Cardiology.      Thanks

## 2023-09-18 NOTE — TELEPHONE ENCOUNTER
Per my last visit note with patient, reportedly taking the following for BP:  irbesartan 300mg, toprol 100mg, diltiazem 240mg, HCTZ 25mg, doxazosin 2mg, carvedilol 25mg bid     Questioned toprol and carvedilol combo and patient/family stated this were prescribed by Cardiologist, neither of these are listed on med list from Cardiology visit (YASMANI signed last visit) and I personally verified with staff at [facility] [place] that they are not currently prescribing either for patient    Last visit w Card 9/5 and doxazosin added at that time

## 2023-09-18 NOTE — TELEPHONE ENCOUNTER
Spoke with daughter regarding appt. Appt has been scheduled and patient was instructed to bring all meds even OTC and home BP machine.

## 2023-09-26 ENCOUNTER — DOCUMENT SCAN (OUTPATIENT)
Dept: HOME HEALTH SERVICES | Facility: HOSPITAL | Age: 68
End: 2023-09-26
Payer: MEDICARE

## 2023-09-27 ENCOUNTER — ON-DEMAND VIRTUAL (OUTPATIENT)
Dept: URGENT CARE | Facility: CLINIC | Age: 68
End: 2023-09-27
Payer: MEDICARE

## 2023-09-27 DIAGNOSIS — R09.81 SINUS CONGESTION: Primary | ICD-10-CM

## 2023-09-27 PROCEDURE — 99499 NO LOS: ICD-10-PCS | Mod: 95,,, | Performed by: FAMILY MEDICINE

## 2023-09-27 PROCEDURE — 99499 UNLISTED E&M SERVICE: CPT | Mod: 95,,, | Performed by: FAMILY MEDICINE

## 2023-10-02 DIAGNOSIS — E11.51 TYPE II DIABETES MELLITUS WITH PERIPHERAL CIRCULATORY DISORDER: ICD-10-CM

## 2023-10-02 RX ORDER — GLIPIZIDE 10 MG/1
TABLET ORAL
Qty: 60 TABLET | Refills: 5 | Status: SHIPPED | OUTPATIENT
Start: 2023-10-02

## 2023-10-03 ENCOUNTER — TELEPHONE (OUTPATIENT)
Dept: INTERNAL MEDICINE | Facility: CLINIC | Age: 68
End: 2023-10-03
Payer: MEDICARE

## 2023-10-03 NOTE — TELEPHONE ENCOUNTER
"Spoke with daughter regarding rescheduling patients appt, she denied an appt regarding medication check and BP check as we cannot properly care for patient as we do not know all the medication she is taking, with Elsie and Dr. Cook and Mrs. Simental stated "every other doctor knows what my mom takes but not you." " She is just mad because she wants her to stop her cardio medicine but the cardiologist said to restart." Explained that we do not know exactly what she is taking as well as the patients cardiologist. Daughter started yelling on the phone as I was trying to explain the situation with her medication and blood pressure, mid sentence daughter hung up the phone and line was disconnected.   "

## 2023-10-03 NOTE — TELEPHONE ENCOUNTER
Please schedule appt, patient has had several cancellations.      BP f/u, she should bring all medication and BP cuff and log to appt please

## 2023-10-16 ENCOUNTER — OFFICE VISIT (OUTPATIENT)
Dept: INTERNAL MEDICINE | Facility: CLINIC | Age: 68
End: 2023-10-16
Payer: MEDICARE

## 2023-10-16 VITALS
RESPIRATION RATE: 18 BRPM | TEMPERATURE: 96 F | BODY MASS INDEX: 36.99 KG/M2 | WEIGHT: 208.75 LBS | DIASTOLIC BLOOD PRESSURE: 80 MMHG | SYSTOLIC BLOOD PRESSURE: 148 MMHG | HEART RATE: 92 BPM | HEIGHT: 63 IN | OXYGEN SATURATION: 97 %

## 2023-10-16 DIAGNOSIS — I69.354 HEMIPARESIS AFFECTING LEFT SIDE AS LATE EFFECT OF CEREBROVASCULAR ACCIDENT: ICD-10-CM

## 2023-10-16 DIAGNOSIS — T40.2X5A CONSTIPATION DUE TO OPIOID THERAPY: ICD-10-CM

## 2023-10-16 DIAGNOSIS — Z79.899 ENCOUNTER FOR MEDICATION REVIEW: Primary | ICD-10-CM

## 2023-10-16 DIAGNOSIS — I10 HYPERTENSION GOAL BP (BLOOD PRESSURE) < 130/80: ICD-10-CM

## 2023-10-16 DIAGNOSIS — Z79.899 POLYPHARMACY: ICD-10-CM

## 2023-10-16 DIAGNOSIS — K59.03 CONSTIPATION DUE TO OPIOID THERAPY: ICD-10-CM

## 2023-10-16 PROCEDURE — 1160F RVW MEDS BY RX/DR IN RCRD: CPT | Mod: CPTII,S$GLB,, | Performed by: INTERNAL MEDICINE

## 2023-10-16 PROCEDURE — 3008F PR BODY MASS INDEX (BMI) DOCUMENTED: ICD-10-PCS | Mod: CPTII,S$GLB,, | Performed by: INTERNAL MEDICINE

## 2023-10-16 PROCEDURE — 99999 PR PBB SHADOW E&M-EST. PATIENT-LVL IV: CPT | Mod: PBBFAC,,, | Performed by: INTERNAL MEDICINE

## 2023-10-16 PROCEDURE — 1159F PR MEDICATION LIST DOCUMENTED IN MEDICAL RECORD: ICD-10-PCS | Mod: CPTII,S$GLB,, | Performed by: INTERNAL MEDICINE

## 2023-10-16 PROCEDURE — 3077F PR MOST RECENT SYSTOLIC BLOOD PRESSURE >= 140 MM HG: ICD-10-PCS | Mod: CPTII,S$GLB,, | Performed by: INTERNAL MEDICINE

## 2023-10-16 PROCEDURE — 3079F PR MOST RECENT DIASTOLIC BLOOD PRESSURE 80-89 MM HG: ICD-10-PCS | Mod: CPTII,S$GLB,, | Performed by: INTERNAL MEDICINE

## 2023-10-16 PROCEDURE — 3288F FALL RISK ASSESSMENT DOCD: CPT | Mod: CPTII,S$GLB,, | Performed by: INTERNAL MEDICINE

## 2023-10-16 PROCEDURE — 3051F PR MOST RECENT HEMOGLOBIN A1C LEVEL 7.0 - < 8.0%: ICD-10-PCS | Mod: CPTII,S$GLB,, | Performed by: INTERNAL MEDICINE

## 2023-10-16 PROCEDURE — 1101F PT FALLS ASSESS-DOCD LE1/YR: CPT | Mod: CPTII,S$GLB,, | Performed by: INTERNAL MEDICINE

## 2023-10-16 PROCEDURE — 3066F PR DOCUMENTATION OF TREATMENT FOR NEPHROPATHY: ICD-10-PCS | Mod: CPTII,S$GLB,, | Performed by: INTERNAL MEDICINE

## 2023-10-16 PROCEDURE — 3008F BODY MASS INDEX DOCD: CPT | Mod: CPTII,S$GLB,, | Performed by: INTERNAL MEDICINE

## 2023-10-16 PROCEDURE — 3061F PR NEG MICROALBUMINURIA RESULT DOCUMENTED/REVIEW: ICD-10-PCS | Mod: CPTII,S$GLB,, | Performed by: INTERNAL MEDICINE

## 2023-10-16 PROCEDURE — 99999 PR PBB SHADOW E&M-EST. PATIENT-LVL IV: ICD-10-PCS | Mod: PBBFAC,,, | Performed by: INTERNAL MEDICINE

## 2023-10-16 PROCEDURE — 3288F PR FALLS RISK ASSESSMENT DOCUMENTED: ICD-10-PCS | Mod: CPTII,S$GLB,, | Performed by: INTERNAL MEDICINE

## 2023-10-16 PROCEDURE — 1101F PR PT FALLS ASSESS DOC 0-1 FALLS W/OUT INJ PAST YR: ICD-10-PCS | Mod: CPTII,S$GLB,, | Performed by: INTERNAL MEDICINE

## 2023-10-16 PROCEDURE — 1126F PR PAIN SEVERITY QUANTIFIED, NO PAIN PRESENT: ICD-10-PCS | Mod: CPTII,S$GLB,, | Performed by: INTERNAL MEDICINE

## 2023-10-16 PROCEDURE — 3079F DIAST BP 80-89 MM HG: CPT | Mod: CPTII,S$GLB,, | Performed by: INTERNAL MEDICINE

## 2023-10-16 PROCEDURE — 90694 VACC AIIV4 NO PRSRV 0.5ML IM: CPT | Mod: S$GLB,,, | Performed by: INTERNAL MEDICINE

## 2023-10-16 PROCEDURE — G0008 ADMIN INFLUENZA VIRUS VAC: HCPCS | Mod: S$GLB,,, | Performed by: INTERNAL MEDICINE

## 2023-10-16 PROCEDURE — 3061F NEG MICROALBUMINURIA REV: CPT | Mod: CPTII,S$GLB,, | Performed by: INTERNAL MEDICINE

## 2023-10-16 PROCEDURE — 4010F ACE/ARB THERAPY RXD/TAKEN: CPT | Mod: CPTII,S$GLB,, | Performed by: INTERNAL MEDICINE

## 2023-10-16 PROCEDURE — 1159F MED LIST DOCD IN RCRD: CPT | Mod: CPTII,S$GLB,, | Performed by: INTERNAL MEDICINE

## 2023-10-16 PROCEDURE — 99214 PR OFFICE/OUTPT VISIT, EST, LEVL IV, 30-39 MIN: ICD-10-PCS | Mod: S$GLB,,, | Performed by: INTERNAL MEDICINE

## 2023-10-16 PROCEDURE — 3077F SYST BP >= 140 MM HG: CPT | Mod: CPTII,S$GLB,, | Performed by: INTERNAL MEDICINE

## 2023-10-16 PROCEDURE — 3066F NEPHROPATHY DOC TX: CPT | Mod: CPTII,S$GLB,, | Performed by: INTERNAL MEDICINE

## 2023-10-16 PROCEDURE — 3051F HG A1C>EQUAL 7.0%<8.0%: CPT | Mod: CPTII,S$GLB,, | Performed by: INTERNAL MEDICINE

## 2023-10-16 PROCEDURE — G0008 FLU VACCINE - QUADRIVALENT - ADJUVANTED: ICD-10-PCS | Mod: S$GLB,,, | Performed by: INTERNAL MEDICINE

## 2023-10-16 PROCEDURE — 99214 OFFICE O/P EST MOD 30 MIN: CPT | Mod: S$GLB,,, | Performed by: INTERNAL MEDICINE

## 2023-10-16 PROCEDURE — 1126F AMNT PAIN NOTED NONE PRSNT: CPT | Mod: CPTII,S$GLB,, | Performed by: INTERNAL MEDICINE

## 2023-10-16 PROCEDURE — 90694 FLU VACCINE - QUADRIVALENT - ADJUVANTED: ICD-10-PCS | Mod: S$GLB,,, | Performed by: INTERNAL MEDICINE

## 2023-10-16 PROCEDURE — 1160F PR REVIEW ALL MEDS BY PRESCRIBER/CLIN PHARMACIST DOCUMENTED: ICD-10-PCS | Mod: CPTII,S$GLB,, | Performed by: INTERNAL MEDICINE

## 2023-10-16 PROCEDURE — 4010F PR ACE/ARB THEARPY RXD/TAKEN: ICD-10-PCS | Mod: CPTII,S$GLB,, | Performed by: INTERNAL MEDICINE

## 2023-10-16 RX ORDER — HYDRALAZINE HYDROCHLORIDE 25 MG/1
25 TABLET, FILM COATED ORAL EVERY 12 HOURS
Qty: 60 TABLET | Refills: 1 | Status: SHIPPED | OUTPATIENT
Start: 2023-10-16 | End: 2023-12-12

## 2023-10-16 RX ORDER — POLYETHYLENE GLYCOL 3350 17 G/17G
17 POWDER, FOR SOLUTION ORAL DAILY
Qty: 510 G | Refills: 11 | Status: SHIPPED | OUTPATIENT
Start: 2023-10-16

## 2023-10-16 NOTE — PROGRESS NOTES
Krysta Manuel  68 y.o. Black or  female  [id number]    Chief Complaint:  Chief Complaint   Patient presents with    Medication review       History of Present Illness:  Here with her daughter for medication review.   She has all of her current medications today.   She needs a refill on Mirlax.   Her b/p remains high. She is seeing an outside cardiologist and is being prescribed diltiazem, carvedilol, irbesartan and HCTZ.   She needs a hospital bed. She has a history of CVA with left sided hemiparesis.     History:  Past Medical History:   Diagnosis Date    Arthritis     Carotid artery disease     Dr. Jessa Szymanski--cardiology    Depression     DM II (diabetes mellitus, type II), controlled 12 years    Heart disease     Dr. Jessa Szymanski--cardiology    HTN (hypertension)     Hyperlipidemia     Hypothyroidism     Insomnia     Stroke 2010    Dr. Jessa Szymanski--cardiology    Vitamin D deficiency        Medications:  Current Outpatient Medications on File Prior to Visit   Medication Sig Dispense Refill    aspirin (ECOTRIN) 81 MG EC tablet Take 1 tablet (81 mg total) by mouth once daily. 90 tablet 1    blood sugar diagnostic (TRUE METRIX GLUCOSE TEST STRIP) Strp USE 3 TIMES DAILY 100 each 11    carvediloL (COREG) 25 MG tablet Take 25 mg by mouth 2 (two) times daily with meals.      clopidogreL (PLAVIX) 75 mg tablet Take 75 mg by mouth once daily.      diltiaZEM (DILACOR XR) 240 MG CDCR Take 240 mg by mouth once daily.      donepeziL (ARICEPT) 10 MG tablet Take 1 tablet (10 mg total) by mouth every evening. 30 tablet 11    dulaglutide (TRULICITY) 4.5 mg/0.5 mL pen injector Inject 4.5 mg into the skin every 7 days. 4 pen 5    EASY TOUCH LANCING DEVICE Misc       fluticasone propionate (FLONASE) 50 mcg/actuation nasal spray 2 SPRAYS IN EACH NOSTRIL EVERY DAY AS NEEDED 16 g 1    gabapentin (NEURONTIN) 100 MG capsule Take 100 mg by mouth.      glipiZIDE (GLUCOTROL) 10 MG tablet TAKE ONE TABLET BY MOUTH TWO  "TIMES A DAY BEFORE MEALS 60 tablet 5    hydroCHLOROthiazide (HYDRODIURIL) 25 MG tablet TAKE 1 TABLET (25 MG TOTAL) BY MOUTH ONCE DAILY. 90 tablet 3    HYDROcodone-acetaminophen (NORCO)  mg per tablet   0    irbesartan (AVAPRO) 300 MG tablet TAKE 1 TABLET (300 MG TOTAL) BY MOUTH EVERY EVENING. 90 tablet 3    LANTUS SOLOSTAR U-100 INSULIN glargine 100 units/mL SubQ pen Inject 72 Units into the skin once daily. 30 mL 5    levothyroxine (SYNTHROID) 125 MCG tablet TAKE 1 TABLET (125 MCG TOTAL) BY MOUTH BEFORE BREAKFAST. 90 tablet 2    linaCLOtide (LINZESS) 290 mcg Cap capsule Take 1 capsule (290 mcg total) by mouth once daily. 30 capsule 11    memantine (NAMENDA) 10 MG Tab Take 1 tablet (10 mg total) by mouth 2 (two) times daily. 180 tablet 1    metFORMIN (GLUCOPHAGE-XR) 500 MG ER 24hr tablet Take 2 tablets (1,000 mg total) by mouth 2 (two) times daily with meals. 360 tablet 0    pen needle, diabetic (SURE COMFORT PEN NEEDLE) 32 gauge x 5/32" Ndle Use daily with insulin pen. 100 each 3    pioglitazone (ACTOS) 30 MG tablet Take 1 tablet (30 mg total) by mouth once daily. 90 tablet 1    rosuvastatin (CRESTOR) 10 MG tablet Take 1 tablet (10 mg total) by mouth nightly. 90 tablet 1    sertraline (ZOLOFT) 100 MG tablet Take 1.5 tablets (150 mg total) by mouth once daily. 135 tablet 1    zolpidem (AMBIEN) 5 MG Tab Take 1 tablet (5 mg total) by mouth nightly as needed (INSOMNIA). 30 tablet 3    lamoTRIgine (LAMICTAL) 100 MG tablet Take 1 tablet (100 mg total) by mouth once daily. 30 tablet 11    lancing device with lancets Kit 1 each by Misc.(Non-Drug; Combo Route) route 3 (three) times daily. 200 each 10       Allergies:  Review of patient's allergies indicates:  No Known Allergies    Review of Systems   Constitutional:  Negative for weight loss.   Gastrointestinal:  Positive for constipation.   Neurological:  Positive for focal weakness and weakness.   Psychiatric/Behavioral:  Positive for memory loss.  "       Exam:  Vitals:    10/16/23 0950   BP: (!) 148/80   Pulse:    Resp:    Temp:      Weight: 94.7 kg (208 lb 12.4 oz)   Body mass index is 36.98 kg/m².      Physical Exam  Vitals reviewed.   Constitutional:       General: She is not in acute distress.     Appearance: She is well-developed. She is not ill-appearing.   Eyes:      General: No scleral icterus.  Cardiovascular:      Rate and Rhythm: Normal rate.   Pulmonary:      Effort: Pulmonary effort is normal. No respiratory distress.   Neurological:      Mental Status: She is alert and oriented to person, place, and time.   Psychiatric:         Behavior: Behavior normal.       Assessment:  The primary encounter diagnosis was Encounter for medication review. Diagnoses of Polypharmacy, Constipation due to opioid therapy, Hypertension goal BP (blood pressure) < 130/80, and Hemiparesis affecting left side as late effect of cerebrovascular accident were also pertinent to this visit.    Plan:  Encounter for medication review    Polypharmacy    Constipation due to opioid therapy  -     refill polyethylene glycol (GLYCOLAX) 17 gram/dose powder; Take 17 g by mouth once daily.  Dispense: 510 g; Refill: 11    Hypertension goal BP (blood pressure) < 130/80  -     start hydrALAZINE (APRESOLINE) 25 MG tablet; Take 1 tablet (25 mg total) by mouth every 12 (twelve) hours.  Dispense: 60 tablet; Refill: 1    Hemiparesis affecting left side as late effect of cerebrovascular accident  -     HOSPITAL BED FOR HOME USE    Immunization due   -     Influenza - Quadrivalent (Adjuvanted)    Follow up in about 4 weeks (around 11/13/2023).

## 2023-10-22 PROCEDURE — G0179 MD RECERTIFICATION HHA PT: HCPCS | Mod: ,,, | Performed by: INTERNAL MEDICINE

## 2023-10-23 DIAGNOSIS — E11.51 TYPE II DIABETES MELLITUS WITH PERIPHERAL CIRCULATORY DISORDER: ICD-10-CM

## 2023-10-23 DIAGNOSIS — I69.354 HEMIPARESIS AFFECTING LEFT SIDE AS LATE EFFECT OF CEREBROVASCULAR ACCIDENT: ICD-10-CM

## 2023-10-23 RX ORDER — DULAGLUTIDE 4.5 MG/.5ML
4.5 INJECTION, SOLUTION SUBCUTANEOUS
Qty: 4 PEN | Refills: 5 | Status: SHIPPED | OUTPATIENT
Start: 2023-10-23

## 2023-10-23 NOTE — TELEPHONE ENCOUNTER
Refill Routing Note   Medication(s) are not appropriate for processing by [facility] for the following reason(s):      Medication outside of protocol    [facility] action(s):  Route Care Due:  None identified            Appointments  past 12m or future 3m with PCP    Date Provider   Last Visit   10/16/2023 Wanda Cook DO   Next Visit   1/22/2024 Wanda Cook DO   ED visits in past 90 days: 1        Note composed:2:56 PM 10/23/2023

## 2023-10-24 RX ORDER — ASPIRIN 81 MG/1
81 TABLET ORAL DAILY
Qty: 30 TABLET | Refills: 11 | Status: SHIPPED | OUTPATIENT
Start: 2023-10-24

## 2023-10-25 ENCOUNTER — PATIENT OUTREACH (OUTPATIENT)
Dept: EMERGENCY MEDICINE | Facility: HOSPITAL | Age: 68
End: 2023-10-25
Payer: MEDICARE

## 2023-10-25 NOTE — PROGRESS NOTES
Pt is well and has no needs during this time, per daughter, Kamille.    ED navigator ensured there was nothing she could help patient with. ED navigator reminded patient to reach out if there is ever anything she can assist with. ED navigator will follow-up with patient on/around 12/11/2023.    Ana María Main  ED Navigator  [phone]

## 2023-10-26 ENCOUNTER — OFFICE VISIT (OUTPATIENT)
Dept: NEUROLOGY | Facility: CLINIC | Age: 68
End: 2023-10-26
Payer: MEDICARE

## 2023-10-26 ENCOUNTER — DOCUMENT SCAN (OUTPATIENT)
Dept: HOME HEALTH SERVICES | Facility: HOSPITAL | Age: 68
End: 2023-10-26
Payer: MEDICARE

## 2023-10-26 DIAGNOSIS — Z86.73 HISTORY OF STROKE: ICD-10-CM

## 2023-10-26 DIAGNOSIS — F41.0 PANIC DISORDER: ICD-10-CM

## 2023-10-26 DIAGNOSIS — R41.89 COGNITIVE CHANGE: ICD-10-CM

## 2023-10-26 DIAGNOSIS — F33.0 MILD EPISODE OF RECURRENT MAJOR DEPRESSIVE DISORDER: Primary | ICD-10-CM

## 2023-10-26 DIAGNOSIS — I69.354 HEMIPARESIS AFFECTING LEFT SIDE AS LATE EFFECT OF CEREBROVASCULAR ACCIDENT: ICD-10-CM

## 2023-10-26 PROCEDURE — 99499 UNLISTED E&M SERVICE: CPT | Mod: S$GLB,,, | Performed by: STUDENT IN AN ORGANIZED HEALTH CARE EDUCATION/TRAINING PROGRAM

## 2023-10-26 PROCEDURE — 90791 PR PSYCHIATRIC DIAGNOSTIC EVALUATION: ICD-10-PCS | Mod: S$GLB,NDTC,, | Performed by: STUDENT IN AN ORGANIZED HEALTH CARE EDUCATION/TRAINING PROGRAM

## 2023-10-26 PROCEDURE — 90791 PSYCH DIAGNOSTIC EVALUATION: CPT | Mod: S$GLB,NDTC,, | Performed by: STUDENT IN AN ORGANIZED HEALTH CARE EDUCATION/TRAINING PROGRAM

## 2023-10-26 PROCEDURE — 99499 NO LOS: ICD-10-PCS | Mod: S$GLB,,, | Performed by: STUDENT IN AN ORGANIZED HEALTH CARE EDUCATION/TRAINING PROGRAM

## 2023-10-26 PROCEDURE — 99999 PR PBB SHADOW E&M-EST. PATIENT-LVL I: CPT | Mod: PBBFAC,,, | Performed by: STUDENT IN AN ORGANIZED HEALTH CARE EDUCATION/TRAINING PROGRAM

## 2023-10-26 PROCEDURE — 99999 PR PBB SHADOW E&M-EST. PATIENT-LVL I: ICD-10-PCS | Mod: PBBFAC,,, | Performed by: STUDENT IN AN ORGANIZED HEALTH CARE EDUCATION/TRAINING PROGRAM

## 2023-10-26 NOTE — PROGRESS NOTES
NEUROPSYCHOLOGY CONSULT    Referral Information  NAME:  Krysta Manuel DATE OF SERVICE: 10/26/2023   MRN#:  [id number] EDUCATION: 9   AGE: 68 y.o. HANDEDNESS: Right    : 1955 RACE: Black or    SEX: Female REFERRAL: Kandi Laurent NP;  Neurology, [facility]     Evaluation methods: I had the pleasure of seeing Krysta Manuel on 10/26/2023 in person at the [facility], Department of Neurology. Data sources for the below report include review of the available medical record, an interview with the patient, and a collateral interview with their daughter with their expressed consent. At the outset of the appointment, the undersigned explained the rationale for the evaluation along with the limits of confidentiality; and verbal informed consent for this evaluation was obtained.    The chief complaint/medical necessity leading to consultation/medical necessity is: cognitive decline    NEUROPSYCHOLOGICAL EVALUATION - CONFIDENTIAL    SUMMARY/ IMPRESSIONS     Ms. Manuel is a 68 y.o., right-handed, Black or , female with 9 years of formal education. She was referred by her neurology team due to cognitive concerns in the context of a history of right basal ganglia stroke in  and more-recent onset of depression and cognitive decline. Medical history is notable for major depressive disorder, anxiety, hyperlipidemia, hypertension, diabetes type 2, hypothyroidism, and chronic kidney disease stage 3. Cognitive screening completed by her referring neurology team on 2023 was below normal limits (education-corrected MoCA = 24/30). Most-recent neurologic exam completed on 2023 was documented as notable for bilateral action tremor, absent left neck flexion, deltoid, biceps, triceps, wrist, and interossei strength, reduced left lower hemibody strength, and decreased left leg and arm muscle tone. Most-recent brain MRI completed on 2023 was  documented as reflecting mild atrophy and a large chronic right basal ganglia/ periventricular white matter infarct with associated white matter degeneration. Awake and asleep EEG completed on 05/24/2023 was within normal limits.     During interview, Ms. Manuel and her daughter reported the insidious onset and slowly worsening course of both mood and cognitive concerns beginning approximately one year ago, superimposed on initial mood changes beginning approximately three years ago as below. Cognitively, Ms. Manuel initially was reluctant to report cognitive changes but typically acknowledged agreement with her daughter when cognitive concerns were discussed. In this context, they reported difficulties with expressive language, visuospatial and navigational skills, difficulty with memory for recent information with benefit from reminders and recognition cueing, and executive/behavioral dysfunction characterized by difficulty understanding abstract concepts and sarcasm and greater difficulty inhibiting frustration or irritability.     Emotionally, Ms. Manuel discussed a current major depressive episode with daily and near-constant anhedonia, avolition, social withdrawal, psychomotor retardation, and sadness.  Depression symptoms are worsened by social isolation and in particular because she no long spends time with her grandchildren. Her physical limitations and reduced autonomy were also discussed as significant contributing factors to depressed mood. Ms. Manuel and her daughter also discussed symptoms concerning for panic with 2-3 instances occurring out of the blue per week. They described increased respiration, sweatiness, dilated pupils, and fear that her blood sugar is low but when checked her blood sugar is normal. Ms. Manuel reported subjective fear and feelings of panic during these events when discussed.     Functionally, Ms. Manuel receives assistance with all basic activities of daily  living due to a combination of the above mood, cognitive, and movement symptoms.     Ms. Manuel has a history of the above stroke, which increases the risk of an organic etiology for her cognitive concerns. Her new-onset mood changes in particular complicate her differential among a primary neurocognitive process with onset subsequent to her above-identified stroke and a primary psychiatric process as causal of interval changes. She does not have psychological or medical concerns that would preclude gathering neuropsychological test data at this time. As such, formal neuropsychological testing is clinically indicated in order to aid in differential diagnosis and treatment planning.    Diagnoses  1. Mild episode of recurrent major depressive disorder        2. History of stroke        3. Hemiparesis affecting left side as late effect of cerebrovascular accident        4. Cognitive change             PLAN/ RECOMMENDATIONS     Provider Recommendations:   On the basis of the above summary, neuropsychological testing is clinically indicated at this time. Ms. Manuel will be scheduled for comprehensive neuropsychological testing. A detailed report including detailed diagnostic information and recommendations will be completed after testing has been completed.     Patient Recommendations:  The next step in your care is to complete neuropsychological testing. Our office staff will reach out to you to schedule an appointment for the testing portion of your evaluation. Please review your after visit summary for more information about your testing appointment.     The above plan/ recommendations were discussed with Ms. Manuel during her appointment today.     Thank you for allowing me to participate in Ms. Gordon care.  If you have any questions, please contact me at [phone].        _____________________  Levi Redding, Ph.D.  Neuropsychologist  Department of Neuropsychology  [facility], [place]  [place]    HISTORY OF PRESENT ILLNESS: Ms. Krysta Manuel is an 68 y.o., right-handed, -American female with 9 years of education who was referred for a neuropsychological evaluation in the setting of a right basal ganglia stroke in 2010 and more-recent onset of depression and memory loss.     Onset of Cognitive Concerns: Insidious, beginning approximately one year ago, predominately as observed changes in mood.    Course of Cognitive Concerns: Ms. Manuel and her daughter reported that her cognitive skills have been slowly worsening. Ms. Manuel reported that things tend to worsen with depressed mood and panic .    Characterization of Cognitive Concerns  Attention/ working memory: Ms. Manuel denied attention or working memory concerns.  Processing speed: Ms. Manuel denied slowing of processing speed.  Language: Ms. Manuel denied language difficulties. Her daughter discussed some worsening of word-finding difficulty.  Both denied receptive language difficulty.   Visual-spatial/ navigation: Ms. Manuel denied visuospatial changes. There are reports of her feeling lost and turned around. She has had difficulty discriminating cars from trucks, and mild difficulty with depth perception.   Psychomotor: Ms. Manuel She has left sided hemiparesis. She had difficulty with motor sequencing and difficulty following complex instruction possibly associated with vision loss. Ms. Manuel and her daughter also discussed errors in movement, e.g. reaching with her palm supinated instead of pronated and backward-bending when she intends to use the toilet.   Memory: Ms. Manuel denied difficulties with memory. Her daughter discussed difficulties with memory for recent conversations and recent events, with frequent benefit from reminder cueing. Ms. Manuel recalls well-learned information well.   Decision making: Ms. Manuel has greater difficulty understanding abstraction, e.g. becoming angry when her  "family makes jokes because she does not realize that they are expressing irony.   Orientation: Ms. Manuel denied errors in orientation to person, place, time, or situation. She uses a calendar at home.     Neuropsychiatric Symptoms:  Hallucinations: Denied  Delusional/Paranoid Thinking: Denied  Apathy: Denied  Irritability/Agitation: Denied but her daughter discussed this symptom, which leads to difficulty engaging in therapy.  Disinhibition: Denied, but her daughter discussed these symptoms on occasion.   Depression/Labile Mood: Ms. Manuel discussed that she feels "a little sad" 2-3 days a week. She discussed very frequent anhedonia, social withdrawal, psychomotor retardation. She discussed that lack of family contact is a major stressor for her.   Anxiety: Ms. Manuel reported possible panic attacks happening a few times per week with diaphoresis, increased respiration, and dilated pupils.     DAILY FUNCTIONING:  Living Environment: In a home in [place] with her daughter and grandchild.     BASIC ADLS:  Feeding: independent  Dressing: independent  Bathing: Requires assistance  Toileting: independent with use of a bedside commode.     IADLS:  Support System: Her daughter  Appointment Management: She gets occasional reminders.   Medication Compliance: Her daughter fills the pill-box and monitors medications because Ms. Manuel prefers to not take some of her medications.   Financial Management: Her daughter has managed finances since her stroke. There have been past instances of over-spending.   Cooking: dependent due to physical changes after the stroke.   Driving: She has never driven.       BRAIN HEALTH RISK FACTORS:  Hearing Loss: Denied  Physical Activity: Denied  Social Isolation: Endorsed  Falls: Denied  Sleep: She sleeps from 10:30-09:00    MEDICAL HISTORY: Ms. Manuel  has a past medical history of Arthritis, Carotid artery disease, Depression, DM II (diabetes mellitus, type II), controlled " (12 years), Heart disease, HTN (hypertension), Hyperlipidemia, Hypothyroidism, Insomnia, Stroke (2010), and Vitamin D deficiency.    NEUROIMAGING:  Results for orders placed or performed during the hospital encounter of 05/25/23   MRI Brain Without Contrast    Narrative    EXAMINATION:  MRI BRAIN WITHOUT CONTRAST    CLINICAL HISTORY:  Memory loss.  Behavioral changes.  Unspecified dementia, unspecified severity, without behavioral disturbance, psychotic disturbance, mood disturbance, and anxietyMemory loss;Mental status change, unknown cause;    TECHNIQUE:  Standard multiplanar noncontrast sequences of the brain.    COMPARISON:  None    FINDINGS:  The ventricles are mildly enlarged consistent with mild atrophy.    There is a large chronic appearing deep right basal ganglia/periventricular white matter infarct consistent with a large chronic appearing lacunar infarct.    No hemorrhagic transformation or hemosiderin deposition.    The diffusion-weighted sequence is normal.  No acute findings.    Pituitary gland region is normal.    There is chronic mucosal thickening within the right maxillary sinus.    The diffusion sequence is negative.      Impression    No acute findings.    Mild atrophy.    Large chronic right basal ganglia/periventricular white matter infarct with associated white matter degeneration.    Chronic right maxillary sinus mucosal thickening.      Electronically signed by: Abhay Hancock MD  Date:    05/25/2023  Time:    11:47         Current medications: Ms. Manuel has a current medication list which includes the following prescription(s): aspirin, true metrix glucose test strip, carvedilol, clopidogrel, diltiazem, donepezil, trulicity, easy touch lancing device, fluticasone propionate, gabapentin, glipizide, hydralazine, hydrochlorothiazide, hydrocodone-acetaminophen, irbesartan, lamotrigine, lancing device with lancets, lantus solostar u-100 insulin, levothyroxine, linaclotide, memantine,  metformin, pen needle, diabetic, pioglitazone, polyethylene glycol, rosuvastatin, sertraline, and zolpidem.    Review of patient's allergies indicates:  No Known Allergies    PSYCHIATRIC HISTORY: She reported that mood changes have occurred over the last three years without any history of psychiatric concerns previously, including following her stroke.    SUICIDAL IDEATION:  History: Denied  Current Stressors: Social isolation and withdrawal.   Active Suicidal Ideation:Denied  Plan/Intent: Denied    FAMILY HISTORY: family history includes Diabetes in her sister; Hypertension in her mother.    PSYCHOSOCIAL HISTORY:   Education:   Level Attained: 9   Learning Difficulties: Endorsed difficulties with English but she was able to persist without help and did not fail.    Special Education: Denied   Repeated Grade: Denied     Vocation:   Highest Attained: Housekeeping and babysitting.    Retired: in 2010 after her stroke.     Relationship Status:   : No   :  4 years ago   Children: 4    SUBSTANCE USE: Ms. Manuel denied a history of heavy or problematic substance use and is a lifelong non-smoker.    MENTAL STATUS AND OBSERVATIONS:  APPEARANCE: Casually dressed and adequate grooming/hygiene.   ALERTNESS/ORIENTATION: Attentive and alert. Ms. Manuel was fully oriented to person, place, time, and situation.   GAIT/MOTOR: Ms. Manuel was transported in a wheelchair. She sat with her left arm in her lap, immobile. She demonstrated adequate movement of her left leg. When attention was drawn to interview, she had noticeable right-leg shaking/ foot tapping. Ms. Manuel was able to mirror but not persist on the [place] 3-step with her right hand.   SENSORY: Vision and hearing were adequate for testing purposes.   SPEECH/LANGUAGE: Normal in rate, rhythm, tone, and volume. Expressive and receptive language were grossly intact.  MOOD/AFFECT: Mood was depressed and affect was blunted and mood-congruent,  with occasional smiling or laughter when appropriate.   INTERPERSONAL BEHAVIOR: Rapport was quickly and easily established   THOUGHT PROCESSES: Thoughts seemed logical and goal-directed.     Billing Documentation     Time spent conducting face to face interview with the patient: 47 minutes; [id number].      Referral Diagnoses:  Z86.73 (ICD-10-CM) - History of stroke   F02.811 (ICD-10-CM) - Major neurocognitive disorder due to multiple etiologies, with agitation   R45.1 (ICD-10-CM) - Agitation

## 2023-11-03 ENCOUNTER — DOCUMENT SCAN (OUTPATIENT)
Dept: HOME HEALTH SERVICES | Facility: HOSPITAL | Age: 68
End: 2023-11-03
Payer: MEDICARE

## 2023-11-08 DIAGNOSIS — F03.90 DEMENTIA WITHOUT BEHAVIORAL DISTURBANCE: ICD-10-CM

## 2023-11-08 DIAGNOSIS — E78.5 HYPERLIPIDEMIA LDL GOAL <70: ICD-10-CM

## 2023-11-08 RX ORDER — ROSUVASTATIN CALCIUM 10 MG/1
10 TABLET, COATED ORAL NIGHTLY
Qty: 90 TABLET | Refills: 3 | Status: SHIPPED | OUTPATIENT
Start: 2023-11-08

## 2023-11-08 NOTE — TELEPHONE ENCOUNTER
No care due was identified.  [facility] Embedded Care Due Messages. Reference number: [id number].   11/08/2023 10:23:53 AM CST

## 2023-11-08 NOTE — TELEPHONE ENCOUNTER
Refill Routing Note   Medication(s) are not appropriate for processing by [facility] for the following reason(s):      Medication outside of protocol    [facility] action(s):  Route  Approve Care Due:  None identified            Appointments  past 12m or future 3m with PCP    Date Provider   Last Visit   10/16/2023 Wanda Cook DO   Next Visit   1/22/2024 Wanda Cook DO   ED visits in past 90 days: 1        Note composed:10:31 AM 11/08/2023

## 2023-11-10 RX ORDER — MEMANTINE HYDROCHLORIDE 10 MG/1
10 TABLET ORAL 2 TIMES DAILY
Qty: 180 TABLET | Refills: 1 | Status: SHIPPED | OUTPATIENT
Start: 2023-11-10

## 2023-11-13 ENCOUNTER — OFFICE VISIT (OUTPATIENT)
Dept: DIABETES | Facility: CLINIC | Age: 68
End: 2023-11-13
Payer: MEDICARE

## 2023-11-13 VITALS
BODY MASS INDEX: 36.86 KG/M2 | WEIGHT: 208 LBS | HEART RATE: 85 BPM | SYSTOLIC BLOOD PRESSURE: 152 MMHG | DIASTOLIC BLOOD PRESSURE: 84 MMHG | HEIGHT: 63 IN

## 2023-11-13 DIAGNOSIS — N18.31 STAGE 3A CHRONIC KIDNEY DISEASE: ICD-10-CM

## 2023-11-13 DIAGNOSIS — E78.5 HYPERLIPIDEMIA, UNSPECIFIED HYPERLIPIDEMIA TYPE: ICD-10-CM

## 2023-11-13 DIAGNOSIS — Z79.4 UNCONTROLLED TYPE 2 DIABETES MELLITUS WITH HYPERGLYCEMIA, WITH LONG-TERM CURRENT USE OF INSULIN: Primary | ICD-10-CM

## 2023-11-13 DIAGNOSIS — E11.65 UNCONTROLLED TYPE 2 DIABETES MELLITUS WITH HYPERGLYCEMIA, WITH LONG-TERM CURRENT USE OF INSULIN: Primary | ICD-10-CM

## 2023-11-13 DIAGNOSIS — I10 HYPERTENSION, UNSPECIFIED TYPE: ICD-10-CM

## 2023-11-13 LAB — GLUCOSE SERPL-MCNC: 137 MG/DL (ref 70–110)

## 2023-11-13 PROCEDURE — 82962 GLUCOSE BLOOD TEST: CPT | Mod: S$GLB,,, | Performed by: NURSE PRACTITIONER

## 2023-11-13 PROCEDURE — 3079F PR MOST RECENT DIASTOLIC BLOOD PRESSURE 80-89 MM HG: ICD-10-PCS | Mod: CPTII,S$GLB,, | Performed by: NURSE PRACTITIONER

## 2023-11-13 PROCEDURE — 3061F PR NEG MICROALBUMINURIA RESULT DOCUMENTED/REVIEW: ICD-10-PCS | Mod: CPTII,S$GLB,, | Performed by: NURSE PRACTITIONER

## 2023-11-13 PROCEDURE — 99999 PR PBB SHADOW E&M-EST. PATIENT-LVL III: ICD-10-PCS | Mod: PBBFAC,,, | Performed by: NURSE PRACTITIONER

## 2023-11-13 PROCEDURE — 1126F AMNT PAIN NOTED NONE PRSNT: CPT | Mod: CPTII,S$GLB,, | Performed by: NURSE PRACTITIONER

## 2023-11-13 PROCEDURE — 1159F MED LIST DOCD IN RCRD: CPT | Mod: CPTII,S$GLB,, | Performed by: NURSE PRACTITIONER

## 2023-11-13 PROCEDURE — 3008F PR BODY MASS INDEX (BMI) DOCUMENTED: ICD-10-PCS | Mod: CPTII,S$GLB,, | Performed by: NURSE PRACTITIONER

## 2023-11-13 PROCEDURE — 4010F ACE/ARB THERAPY RXD/TAKEN: CPT | Mod: CPTII,S$GLB,, | Performed by: NURSE PRACTITIONER

## 2023-11-13 PROCEDURE — 3077F SYST BP >= 140 MM HG: CPT | Mod: CPTII,S$GLB,, | Performed by: NURSE PRACTITIONER

## 2023-11-13 PROCEDURE — 1126F PR PAIN SEVERITY QUANTIFIED, NO PAIN PRESENT: ICD-10-PCS | Mod: CPTII,S$GLB,, | Performed by: NURSE PRACTITIONER

## 2023-11-13 PROCEDURE — 3288F FALL RISK ASSESSMENT DOCD: CPT | Mod: CPTII,S$GLB,, | Performed by: NURSE PRACTITIONER

## 2023-11-13 PROCEDURE — 3288F PR FALLS RISK ASSESSMENT DOCUMENTED: ICD-10-PCS | Mod: CPTII,S$GLB,, | Performed by: NURSE PRACTITIONER

## 2023-11-13 PROCEDURE — 1160F RVW MEDS BY RX/DR IN RCRD: CPT | Mod: CPTII,S$GLB,, | Performed by: NURSE PRACTITIONER

## 2023-11-13 PROCEDURE — 99214 OFFICE O/P EST MOD 30 MIN: CPT | Mod: S$GLB,,, | Performed by: NURSE PRACTITIONER

## 2023-11-13 PROCEDURE — 4010F PR ACE/ARB THEARPY RXD/TAKEN: ICD-10-PCS | Mod: CPTII,S$GLB,, | Performed by: NURSE PRACTITIONER

## 2023-11-13 PROCEDURE — 3051F HG A1C>EQUAL 7.0%<8.0%: CPT | Mod: CPTII,S$GLB,, | Performed by: NURSE PRACTITIONER

## 2023-11-13 PROCEDURE — 82962 POCT GLUCOSE, HAND-HELD DEVICE: ICD-10-PCS | Mod: S$GLB,,, | Performed by: NURSE PRACTITIONER

## 2023-11-13 PROCEDURE — 1101F PR PT FALLS ASSESS DOC 0-1 FALLS W/OUT INJ PAST YR: ICD-10-PCS | Mod: CPTII,S$GLB,, | Performed by: NURSE PRACTITIONER

## 2023-11-13 PROCEDURE — 3061F NEG MICROALBUMINURIA REV: CPT | Mod: CPTII,S$GLB,, | Performed by: NURSE PRACTITIONER

## 2023-11-13 PROCEDURE — 1160F PR REVIEW ALL MEDS BY PRESCRIBER/CLIN PHARMACIST DOCUMENTED: ICD-10-PCS | Mod: CPTII,S$GLB,, | Performed by: NURSE PRACTITIONER

## 2023-11-13 PROCEDURE — 3008F BODY MASS INDEX DOCD: CPT | Mod: CPTII,S$GLB,, | Performed by: NURSE PRACTITIONER

## 2023-11-13 PROCEDURE — 3051F PR MOST RECENT HEMOGLOBIN A1C LEVEL 7.0 - < 8.0%: ICD-10-PCS | Mod: CPTII,S$GLB,, | Performed by: NURSE PRACTITIONER

## 2023-11-13 PROCEDURE — 1101F PT FALLS ASSESS-DOCD LE1/YR: CPT | Mod: CPTII,S$GLB,, | Performed by: NURSE PRACTITIONER

## 2023-11-13 PROCEDURE — 3077F PR MOST RECENT SYSTOLIC BLOOD PRESSURE >= 140 MM HG: ICD-10-PCS | Mod: CPTII,S$GLB,, | Performed by: NURSE PRACTITIONER

## 2023-11-13 PROCEDURE — 99214 PR OFFICE/OUTPT VISIT, EST, LEVL IV, 30-39 MIN: ICD-10-PCS | Mod: S$GLB,,, | Performed by: NURSE PRACTITIONER

## 2023-11-13 PROCEDURE — 99999 PR PBB SHADOW E&M-EST. PATIENT-LVL III: CPT | Mod: PBBFAC,,, | Performed by: NURSE PRACTITIONER

## 2023-11-13 PROCEDURE — 3066F NEPHROPATHY DOC TX: CPT | Mod: CPTII,S$GLB,, | Performed by: NURSE PRACTITIONER

## 2023-11-13 PROCEDURE — 3079F DIAST BP 80-89 MM HG: CPT | Mod: CPTII,S$GLB,, | Performed by: NURSE PRACTITIONER

## 2023-11-13 PROCEDURE — 1159F PR MEDICATION LIST DOCUMENTED IN MEDICAL RECORD: ICD-10-PCS | Mod: CPTII,S$GLB,, | Performed by: NURSE PRACTITIONER

## 2023-11-13 PROCEDURE — 3066F PR DOCUMENTATION OF TREATMENT FOR NEPHROPATHY: ICD-10-PCS | Mod: CPTII,S$GLB,, | Performed by: NURSE PRACTITIONER

## 2023-11-13 NOTE — PROGRESS NOTES
PCP: Wanda Cook DO    Subjective:     Chief Complaint: Diabetes follow up.  Last visit with me: 9/13/23    HPI: 68 y.o.  female for diabetes follow up.   Type II diabetes for > 10 years.  Comorbidities: HTN, HLD, CAD, S/p CVA (2010), Left Hemiparesis, Hypothyroidism and Overweight by BMI   Personal history of pancreatitis: denies  Family history of pancreatic cancer in first-degree relative: denies  Family history of MTC/MEN/endocrine tumors: denies     Family History of Type 2 DM: father  Known diabetes complications:  DKA/HHS: no  Retinopathy: no  Peripheral neuropathy: no  Nephropathy: no    Interval history:  Daughter with patient during visit.  Admits to inconsistent Lantus intake per daughter.  Patient refusing to take if premeal BG on the 100's.    Denies recent hospital admission or ER visit.  Admits having hypoglycemia early morning.   Reports taking Glipizide after a light dinner last night.  Endorses diabetes related symptoms: None.    Blood glucose monitoring via fingerstick: 2-3 x/day   Per review of BG log, over the last 1 week   Fasting BG range 115, daughter reports average 140   AC lunch 88 , daughter reports average 130-170  AC dinner 184, 193, 229    Activity level: Sedentary  Meal Planning:3 meals/day;1 snacks/day.    Social history:    - Single, Lives with daughter, grandchild    Medication compliance all of the time, diet compliance most of the time.   To be enrolled in diabetes education classes.     Previous regimen: Humalog 75/25    Past failed treatment: N/A    Current DM Medications:  Diabetes Medications               dulaglutide (TRULICITY) 4.5 mg/0.5 mL pen injector INJECT 4.5 MG INTO THE SKIN EVERY 7 DAYS.    glipiZIDE (GLUCOTROL) 10 MG tablet TAKE ONE TABLET BY MOUTH TWO TIMES A DAY BEFORE MEALS    LANTUS SOLOSTAR U-100 INSULIN glargine 100 units/mL SubQ pen Inject 72 Units into the skin once daily.  Inconsistent    metFORMIN (GLUCOPHAGE-XR) 500 MG ER 24hr tablet  "Take 2 tablets (1,000 mg total) by mouth 2 (two) times daily with meals.    pioglitazone (ACTOS) 30 MG tablet Take 1 tablet (30 mg total) by mouth once daily.       Allergies  Review of patient's allergies indicates:  No Known Allergies    Labs Reviewed:  Her most recent A1C is:  Lab Results   Component Value Date    HGBA1C 7.3 (H) 09/06/2023    HGBA1C 7.0 (H) 05/15/2023    HGBA1C 7.0 (H) 02/13/2023       Her most recent BMP is:  Lab Results   Component Value Date     09/14/2023    K 4.6 09/14/2023     09/14/2023    CO2 24 09/14/2023    BUN 14 09/14/2023    CREATININE 1.1 09/14/2023    CALCIUM 10.9 (H) 09/14/2023    ANIONGAP 11 09/14/2023    ESTGFRAFRICA >60.0 07/18/2022    EGFRNONAA 58.0 (A) 07/18/2022       No results found for: "CPEPTIDE"  No results found for: "GLUTAMICACID"    Body mass index is 36.85 kg/m².    Wt Readings from Last 3 Encounters:   11/13/23 1140 94.3 kg (208 lb)   10/16/23 0903 94.7 kg (208 lb 12.4 oz)   09/14/23 0923 93 kg (205 lb 0.4 oz)        Her blood sugar in clinic today is: 137    Diabetes Management Status  Statin: Taking  ACE/ARB: Taking    Screening or Prevention Patient's value Goal Complete/Controlled?   HgA1C Testing and Control   Lab Results   Component Value Date    HGBA1C 7.3 (H) 09/06/2023      Annually/Less than 8% Yes   Lipid profile : 09/06/2023 Annually Yes   LDL control Lab Results   Component Value Date    LDLCALC 51.8 (L) 09/06/2023    Annually/Less than 100 mg/dl  Yes   Nephropathy screening Lab Results   Component Value Date    MICALBCREAT Unable to calculate 02/13/2023     Lab Results   Component Value Date    PROTEINUA 1+ (A) 09/06/2023    Annually Yes   Blood pressure BP Readings from Last 1 Encounters:   11/13/23 (!) 152/84    Less than 140/90 No   Dilated retinal exam : 03/27/2023 Annually Yes    Foot exam   : 05/05/2022 Annually No     Social History     Socioeconomic History    Marital status:    Tobacco Use    Smoking status: Never    " Smokeless tobacco: Never   Substance and Sexual Activity    Alcohol use: Not Currently    Drug use: Never     Social Determinants of Health     Financial Resource Strain: Medium Risk (9/7/2023)    Overall Financial Resource Strain (CARDIA)     Difficulty of Paying Living Expenses: Somewhat hard   Food Insecurity: Food Insecurity Present (9/7/2023)    Hunger Vital Sign     Worried About Running Out of Food in the Last Year: Sometimes true     Ran Out of Food in the Last Year: Sometimes true   Transportation Needs: No Transportation Needs (9/7/2023)    PRAPARE - Transportation     Lack of Transportation (Medical): No     Lack of Transportation (Non-Medical): No   Physical Activity: Inactive (9/7/2023)    Exercise Vital Sign     Days of Exercise per Week: 0 days     Minutes of Exercise per Session: 0 min   Stress: Stress Concern Present (9/7/2023)    [employer] - Occupational Stress Questionnaire     Feeling of Stress : Very much   Social Connections: Socially Isolated (9/7/2023)    Social Connection and Isolation Panel [NHANES]     Frequency of Communication with Friends and Family: Never     Frequency of Social Gatherings with Friends and Family: Never     Attends [employer] Services: Never     Active Member of Clubs or Organizations: No     Attends Club or Organization Meetings: Never     Marital Status:    Housing Stability: Low Risk  (9/7/2023)    Housing Stability Vital Sign     Unable to Pay for Housing in the Last Year: No     Number of Places Lived in the Last Year: 2     Unstable Housing in the Last Year: No     Past Medical History:   Diagnosis Date    Arthritis     Carotid artery disease     Dr. Jessa Szymanski--cardiology    Depression     DM II (diabetes mellitus, type II), controlled 12 years    Heart disease     Dr. Jessa Szymanski--cardiology    HTN (hypertension)     Hyperlipidemia     Hypothyroidism     Insomnia     Stroke 2010    Dr. Jessa Szymanski--cardiology     Vitamin D deficiency      Review of Systems   Constitutional: Negative for activity change, appetite change, fatigue and unexpected weight change.   HENT: Negative for dental problem and trouble swallowing.    Eyes: Negative for visual disturbance.   Respiratory: Negative for chest tightness and shortness of breath.    Cardiovascular: Negative for chest pain, palpitations and leg swelling.   Gastrointestinal: Negative for abdominal pain, constipation, diarrhea, nausea and vomiting.   Endocrine: Negative for polydipsia, polyphagia and polyuria.   Genitourinary: Negative for difficulty urinating, dysuria, frequency and urgency.        Negative yeast infection   Musculoskeletal: Positive for gait problem (r/t left sided weakness, ambulates with quad cane). Negative for myalgias and neck pain.   Integumentary:  Negative for rash and wound.   Allergic/Immunologic: Negative.    Neurological: Positive for weakness (residual left sided secondary to CVA). Negative for dizziness, syncope, numbness and headaches.   Hematological: Negative.    Psychiatric/Behavioral: Negative for confusion and sleep disturbance.   All other systems reviewed and are negative.     Objective:       Physical Exam  Vitals reviewed.   Constitutional:       Appearance: Normal appearance.   HENT:      Head: Normocephalic and atraumatic.   Eyes:      General: Vision grossly intact.      Extraocular Movements: Extraocular movements intact.      Pupils: Pupils are equal, round, and reactive to light.   Neck:      Thyroid: No thyroid mass or thyroid tenderness.   Cardiovascular:      Rate and Rhythm: Normal rate and regular rhythm.      Pulses: Normal pulses.           Radial pulses are 2+ on the right side and 2+ on the left side.        Dorsalis pedis pulses are 2+ on the right side and 2+ on the left side.      Heart sounds: Normal heart sounds.   Pulmonary:      Effort: Pulmonary effort is normal.      Breath sounds: Normal breath sounds.   Abdominal:       General: Bowel sounds are normal.      Palpations: Abdomen is soft.   Musculoskeletal:         General: Normal range of motion.      Cervical back: Normal range of motion and neck supple.      Right lower leg: No edema.      Left lower leg: No edema.      Right foot: No deformity or bunion.      Left foot: No deformity or bunion.      Comment: Residual left sided weakness.  Feet:      Right foot:      Protective Sensation: 6 sites tested. 6 sites sensed.      Skin integrity: Skin integrity normal. No ulcer, skin breakdown, callus or dry skin.      Toenail Condition: Right toenails are abnormally thick.      Left foot:      Protective Sensation: 6 sites tested. 6 sites sensed.      Skin integrity: Skin integrity normal. No ulcer, skin breakdown, callus or dry skin.      Toenail Condition: Left toenails are abnormally thick.      Comments: Pinprick exam completed with 10-g monofilament. Vibration sensation intact.  Lymphadenopathy:      Cervical: No cervical adenopathy.   Skin:     General: Skin is warm and dry.      Findings: No rash or wound.      Comments: Negative for acanthosis nigricans. Well-healed SQ injection sites.   Neurological:      Mental Status: She is alert and oriented to person, place, and time.      Coordination: Coordination is intact.      Gait: Gait is intact.   Psychiatric:         Attention and Perception: Attention normal.         Mood and Affect: Mood normal.         Speech: Speech normal.         Behavior: Behavior normal. Behavior is cooperative.         Thought Content: Thought content normal.         Cognition and Memory: Cognition normal.     Assessment / Plan:     Diagnoses and all orders for this visit:    Uncontrolled type 2 diabetes mellitus with hyperglycemia, with long-term current use of insulin  -     POCT Glucose, Hand-Held Device    Hypertension, unspecified type    Hyperlipidemia, unspecified hyperlipidemia type    Stage 3a chronic kidney disease    Additional Plan  Details:  - Condition: Sub optimal control, most recent A1C 7.3 % was completed 1 week ago.   - Monitor blood glucose:  4x daily.Goals reviewed. Maintain BG log. Bring to next visit for review.  - Hypoglycemia protocol: Reviewed and risk discussed.  Notify if BG readings below 80. Protocol printout provided.   - Diet: Limit carbohydrate intake 30-45 grams/meal, 3x daily and 15 grams/snack, limit 2/day.  Discuss options of healthy snacks.  - Activity: Recommend at least 150 minutes of exercise in a week.  - BP and LDL: Recommend lifestyle modifications and follow up with PCP for management.   - Medication Regimen:     Continue Trulicity 4.5 mg weekly  Continue Glipizide 10 tablet, 1 tablet before breakfast and 1 tablet before dinner, may do half tablet if eating lighter meals. Discussed timing of administration.  Continue Lantus 72 units in the morning. Discussed consistency.  Continue Metformin 500 mg, 2 tablets twice daily with meals  Continue Actos 30 mg daily    - Medication consideration: Continue regimen. Discussed consistency of Lantus and timing of Glipizide.   - Lab results: A1C discussed.  - Health Maintenance standards addressed today:  A1c scheduled.   - Risks of uncontrolled DM explained. Importance of routine foot and eye exams reviewed. Scheduled as appropriate.  -The patient was explained the above plan and given opportunity to ask questions.  She understands, chooses and consents to this plan and accepts all the risks, which include but are not limited to the risks mentioned above.   - Labs ordered as above.  - CDE referral: Scheduled  - Follow up: 2 months in clinic.      Charlotte T. Delacruz, FNP-C [facility]    A total of 30 minutes was spent in face to face time, of which over 50 % was spent in counseling patient on disease process, complications, treatment, and side effects of medications.

## 2023-11-13 NOTE — PATIENT INSTRUCTIONS
Medication Regimen:   Continue Trulicity 4.5 mg weekly  Continue Glipizide 10 tablet, 1 tablet before breakfast and 1 tablet before dinner, may do half tablet if eating lighter meals.   Continue Lantus 72 units in the morning   Continue Metformin 500 mg, 2 tablets twice daily with meals  Continue Actos 30 mg daily    Patient Instructions:  Carbohydrate Count: 30-45 grams/meal and 15 grams/snack with limit of 2 snacks per day.  Exercise: Goal is 150 minutes or more per week.  Bring meter and blood sugar log to each appointment.     Goals for Blood Sugar:  Fastin-130 mg/dl  2 hours after meal:  mg/dl  Before Bed: 100-150 mg/dl  If Blood sugar is below 70 mg/dl, DO NOT take diabetes medication. Eat first and then recheck blood sugar in 20 minutes.  Foods to eat if blood sugar is below 70 mg/dl  1/2 glass of orange juice   1/2 regular cola can   3-4 hard candies   3-4 small glucose tabs.   Foods to eat if blood sugar is below 50 mg/dl  1 glass of orange juice  1 regular cola can  8 hard candies  8 small glucose tabs.   If you have repeated eating/blood sugar recheck process 2 times and blood sugar still below 70 mg/dl, call 911.

## 2023-11-16 ENCOUNTER — OFFICE VISIT (OUTPATIENT)
Dept: NEUROLOGY | Facility: CLINIC | Age: 68
End: 2023-11-16
Payer: MEDICARE

## 2023-11-16 DIAGNOSIS — F41.0 PANIC DISORDER WITHOUT AGORAPHOBIA: ICD-10-CM

## 2023-11-16 DIAGNOSIS — Z86.73 HISTORY OF STROKE: ICD-10-CM

## 2023-11-16 DIAGNOSIS — F33.0 MILD EPISODE OF RECURRENT MAJOR DEPRESSIVE DISORDER: ICD-10-CM

## 2023-11-16 DIAGNOSIS — F02.811 MAJOR NEUROCOGNITIVE DISORDER DUE TO MULTIPLE ETIOLOGIES, WITH AGITATION: ICD-10-CM

## 2023-11-16 DIAGNOSIS — F02.80 MAJOR NEUROCOGNITIVE DISORDER DUE TO MULTIPLE ETIOLOGIES: Primary | ICD-10-CM

## 2023-11-16 PROCEDURE — 99499 NO LOS: ICD-10-PCS | Mod: S$GLB,,, | Performed by: STUDENT IN AN ORGANIZED HEALTH CARE EDUCATION/TRAINING PROGRAM

## 2023-11-16 PROCEDURE — 96138 PSYCL/NRPSYC TECH 1ST: CPT | Mod: S$GLB,,, | Performed by: STUDENT IN AN ORGANIZED HEALTH CARE EDUCATION/TRAINING PROGRAM

## 2023-11-16 PROCEDURE — 96132 NRPSYC TST EVAL PHYS/QHP 1ST: CPT | Mod: S$GLB,,, | Performed by: STUDENT IN AN ORGANIZED HEALTH CARE EDUCATION/TRAINING PROGRAM

## 2023-11-16 PROCEDURE — 96132 PR NEUROPSYCHOLOGIC TEST EVAL SVCS, 1ST HR: ICD-10-PCS | Mod: S$GLB,,, | Performed by: STUDENT IN AN ORGANIZED HEALTH CARE EDUCATION/TRAINING PROGRAM

## 2023-11-16 PROCEDURE — 96139 PR PSYCH/NEUROPSYCH TEST ADMIN/SCORING, BY TECH, 2+ TESTS, EA ADDTL 30 MIN: ICD-10-PCS | Mod: S$GLB,,, | Performed by: STUDENT IN AN ORGANIZED HEALTH CARE EDUCATION/TRAINING PROGRAM

## 2023-11-16 PROCEDURE — 96133 PR NEUROPSYCHOLOGIC TEST EVAL SVCS, EA ADDTL HR: ICD-10-PCS | Mod: S$GLB,,, | Performed by: STUDENT IN AN ORGANIZED HEALTH CARE EDUCATION/TRAINING PROGRAM

## 2023-11-16 PROCEDURE — 99999 PR PBB SHADOW E&M-EST. PATIENT-LVL II: ICD-10-PCS | Mod: PBBFAC,,, | Performed by: STUDENT IN AN ORGANIZED HEALTH CARE EDUCATION/TRAINING PROGRAM

## 2023-11-16 PROCEDURE — 96133 NRPSYC TST EVAL PHYS/QHP EA: CPT | Mod: S$GLB,,, | Performed by: STUDENT IN AN ORGANIZED HEALTH CARE EDUCATION/TRAINING PROGRAM

## 2023-11-16 PROCEDURE — 96138 PR PSYCH/NEUROPSYCH TEST ADMIN/SCORING, BY TECH, 2+ TESTS, 1ST 30 MIN: ICD-10-PCS | Mod: S$GLB,,, | Performed by: STUDENT IN AN ORGANIZED HEALTH CARE EDUCATION/TRAINING PROGRAM

## 2023-11-16 PROCEDURE — 99999 PR PBB SHADOW E&M-EST. PATIENT-LVL II: CPT | Mod: PBBFAC,,, | Performed by: STUDENT IN AN ORGANIZED HEALTH CARE EDUCATION/TRAINING PROGRAM

## 2023-11-16 PROCEDURE — 99499 UNLISTED E&M SERVICE: CPT | Mod: S$GLB,,, | Performed by: STUDENT IN AN ORGANIZED HEALTH CARE EDUCATION/TRAINING PROGRAM

## 2023-11-16 PROCEDURE — 96139 PSYCL/NRPSYC TST TECH EA: CPT | Mod: S$GLB,,, | Performed by: STUDENT IN AN ORGANIZED HEALTH CARE EDUCATION/TRAINING PROGRAM

## 2023-11-21 NOTE — PROGRESS NOTES
NEUROPSYCHOLOGY CONSULT    Referral Information  NAME:  Krysta Manuel DATE OF SERVICE: 2023   MRN#:  [id number] EDUCATION: 9   AGE: 68 y.o. HANDEDNESS: Right    : 1955 RACE: Black or    SEX: Female REFERRAL: Kandi Laurent NP;  Neurology, [facility]     Evaluation methods: I had the pleasure of seeing Krysta Manuel on 2023 in person at the [facility], Department of Neurology. Data sources for the below report include review of the available medical record, an interview with the patient and her daughter on 10/26/2023, and administration of a series of neuropsychological tests listed in the Results section of this report. At the outset of the appointment, the undersigned explained the rationale for the evaluation along with the limits of confidentiality; and verbal informed consent for this evaluation was obtained.    The chief complaint/medical necessity leading to consultation/medical necessity is: cognitive decline    NEUROPSYCHOLOGICAL EVALUATION - CONFIDENTIAL    SUMMARY/TREATMENT PLAN   Summary of History:  Ms. Manuel is a 68 y.o., right-handed, Black or , female with 9 years of formal education. She was referred by her neurology team due to cognitive concerns in the context of a history of right basal ganglia stroke in  and more-recent onset of depression and cognitive decline. Medical history is notable for major depressive disorder, anxiety, hyperlipidemia, hypertension, diabetes type 2, hypothyroidism, and chronic kidney disease stage 3. Cognitive screening completed by her referring neurology team on 2023 was below normal limits (education-corrected MoCA = 24/30). Most-recent neurologic exam completed on 2023 was documented as notable for bilateral action tremor, absent left neck flexion, deltoid, biceps, triceps, wrist, and interossei strength, reduced left lower hemibody strength, and decreased left  leg and arm muscle tone. Most-recent brain MRI completed on 05/25/2023 was documented as reflecting mild atrophy and a large chronic right basal ganglia/ periventricular white matter infarct with associated white matter degeneration. Awake and asleep EEG completed on 05/24/2023 was within normal limits.      During interview, Ms. Manuel and her daughter reported the insidious onset and slowly worsening course of both mood and cognitive concerns beginning approximately one year ago, superimposed on initial mood changes beginning approximately three years ago as below. Cognitively, Ms. Manuel initially was reluctant to report cognitive changes but typically acknowledged agreement with her daughter when cognitive concerns were discussed. In this context, they reported difficulties with expressive language, visuospatial and navigational skills, difficulty with memory for recent information with benefit from reminders and recognition cueing, and executive/behavioral dysfunction characterized by difficulty understanding abstract concepts and sarcasm and greater difficulty inhibiting frustration or irritability.      Emotionally, Ms. Manuel discussed a current major depressive episode with daily and near-constant anhedonia, avolition, social withdrawal, psychomotor retardation, and sadness.  Depression symptoms are worsened by social isolation and in particular because she no long spends time with her grandchildren. Her physical limitations and reduced autonomy were also discussed as significant contributing factors to depressed mood. Ms. Manuel and her daughter also discussed symptoms concerning for panic with 2-3 instances occurring out of the blue per week. They described increased respiration, sweatiness, dilated pupils, and fear that her blood sugar is low but when checked her blood sugar is normal. Ms. Manuel reported subjective fear and feelings of panic during these events when discussed.       Functionally, Ms. Manuel receives assistance with all instrumental and select basic activities of daily living due to a combination of the above mood, cognitive, and motor symptoms.     Test Results:    [place] Findings:   Ms. Manuel is a woman of estimated below average baseline cognitive functioning on the basis of a combined metric including demographic data and her performance on a single-word reading task.  Current intellectual functioning is below-average and consistent with baseline estimates, suggesting against generalized cognitive decline.  Performances in the following areas were consistent with her estimated baseline, suggesting against significant declines in relevant domains:  Auditory attention  Working memory  Receptive language  Phonemic better than semantic verbal fluency  Visuospatial skills  Verbal and visual abstract reasoning  Right hand fine motor dexterity  Simple motor processing speed  Performances in the following areas were below normal limits, suggesting weakness or decline in relevant domains:  Retention, recall, and recognition for verbal information with subtly-suppressed initial encoding.   Learning, recall, and recognition of visual information with adequate retention of the limited information encoded.   Graphomotor processing speed  Naming to confrontation.  Although normative data are unavailable, Ms. Manuel discontinued a timed multitasking test early in the context of significant difficulty.   Ms. Manuel's daughter endorsed clinically-significant declines in ADL functioning in all domains assessed on a standard caregiver questionnaire: self care, household care, employment & recreation, shopping & money, travel, and communication  On screening measures of depression and anxiety, Ms. Manuel endorsed clinically significant mild levels of both depression and anxiety.    Data Synthesis: Cognitive test results are consistent with left greater than right mesial temporal  and temporal language systems dysfunction; and frontal-subcortical systems dysfunction.  Although depression and anxiety are present and contribute to functional impairment as above, mood alone would not explain her cognitive test performances.     Diagnostic Considerations: Ms. Manuel has evidence for clinically significant cognitive decline relative to her baseline. In this context, there is evidence for functional decline due to her cognitive changes on the basis of interview and report of her caregiver on a standard inventory. As such, she qualifies for a major neurocognitive disorder diagnosis.    Ms. Manuel's responses on mood screening inventories are consistent with impressions following interview of mild recurrent major depressive disorder and panic disorder without agoraphobia. It is likely that cognitive and functional changes are worsening mood concerns, and both may have a reciprocal relationship day-to-day in particular. However, mood concerns alone are unlikely to explain cognitive test performances.     Etiologic/ Staging Considerations: Cognitive test results and the insidious onset and progressive course of memory concerns are consistent with her neurology team's concern for an Alzheimer's disease process superimposed on her cerebrovascular history. In particular, her right basal ganglia stroke would not explain her pattern of memory and semantic language difficulties. Given her pattern of primary memory loss and relative sparing of semantic language, I do not suspect an advanced Alzheimer's disease process.       Diagnoses  1. Major neurocognitive disorder due to multiple etiologies  Ambulatory consult to Neuropsychology    Vascular, Alzhimers Disease, Hx Stroke      2. Mild episode of recurrent major depressive disorder        3. Panic disorder without agoraphobia        4. History of stroke  Ambulatory consult to Neuropsychology         Provider Recommendations:     Ms. Manuel will be  encouraged to follow up with her referring provider in order to integrate these findings into the overall context of her clinical care, and to implement any of the below recommendations as appropriate.     Test results support your plan of continued management on Aricept and Namenda.     Consider whether augmenting her pharmacologic management of depression is safe in the context of her current medication regimen. Individual psychotherapy is less likely to be beneficial given memory loss.     Repeat assessment is recommended in 12 months, in order to assess for changes over time and update her treatment plan. Scores from this assessment should be used as a baseline for comparison at that time.       Patient Recommendations:  The next step in your care is to follow up with your referring provider in order to help with continued management of your care. The below recommendations may help you and your family compensate for your difficulties and better understand the reasons for your cognitive changes.      Given your cognitive concerns, the following safety recommendations are made to help keep you and others safe:  You would benefit from continued assistance with medication management to ensure that you take the correct medications and doses at the right times and you obtain refills as needed. If medication errors begin to occur with your current system, I will recommend that your family begin to provide medications to you. As of now, your plan appears to meet your needs.   You will also need assistance with daily management of diabetes, as you may forget to check blood sugar or make errors taking the right amount of insulin.  You are at increased risk for wandering and getting lost, even in familiar surroundings, and may need to bring someone with you when you are out of the house.    I agree with your neurology team's referral to  home health. If not already completed, discuss with home health whether they can  complete an occupational therapy evaluation of independent living skills and related safety concerns. This assessment will help determine the appropriate level of supervision; and may help you develop supports to maintain independence.     There are steps you can take to improve your long-term brain health and reduce your risk for cognitive decline in the future. I encourage you to follow the recommendations in your daily life:  Engage in physical activity (i.e., something that gets your heart rate up) totaling at least 15-30 minutes per day. Regular exercise is very important for both long-term health and stress management. Walking, hiking, elliptical, stationary biking, dancing, and yoga are all good options.   Work closely with your doctor(s) to manage any vascular conditions such as high blood pressure, high cholesterol, and diabetes. Follow the management guidelines from the American Heart Association at www.heart.org.  Remain mentally engaged by keeping socially active, reading, learning new skills, playing games, or participating in a hobby.  Get a good night's sleep by avoiding screens 30-60 minutes before bed and sticking to a regular sleep schedule.  Eat a balanced, heart-healthy diet that is low in salt, saturated fats, and added sugar. The Mediterranean diet has been shown to be especially healthy; studies show that it may reduce the risk of developing dementia and slow the rate of age-related cognitive decline.     For management of stress/anxiety, I recommend you work with a caregiver to engage in daily practice of relaxation strategies (e.g., deep breathing, progressive muscle relaxation, mindfulness), the following are just a few good examples:   The smartphone jessie [id number] can help guide you through a deep breathing exercise that may help with anxiety.   There are also excellent free videos for guided meditation, muscle relaxation, and mindfulness on Youtube or other video platforms.   I  "recommend trying some and finding something that works. For any of these skills, they only work if you practice them regularly.   I recommend first choosing one skill and practicing it ten minutes a day when you do not feel anxious or distressed. Then you can use these skills when you need them.     Repeat assessment is indicated in 12 months, or sooner in the event that you or your family notice significant cognitive or functional declines. At that time scores from this assessment should be used as a baseline for comparison.     The following strategies and community resources are provided in case they become necessary in the future. While I do not expect that these strategies will be needed I would like you and your family to have tools in case troubling symptoms arise.     Behavioral Management Strategies:  Remember that you cannot reason with acute psychosis or confusion. Unless there is an immediate safety issue, there is no need to challenge or correct the person.  For example, if Ms. Manuel is hallucinating, do not argue her that what they are seeing is not real. If they are expressing paranoia, empathize gently with their fear, and attempt to redirect them to a different activity.   If the Ms. Manuel is particularly stuck on a topic or activity, as long as the activity is safe and is not worsening their agitation, you don't need to intervene.   Communicate calmly, simply, and concisely  Reassure Ms. Manuel that they are safe and you are here to help  Try not to express irritation or anger  Speak quietly and calmly, do not shout or threaten Ms. Manuel. Avoid provocation.   Establish verbal contact and provide orientation and reassurance.  Attempt to identify Ms. Manule's wants and feelings, listen to what they are saying, and reflect those wants and feelings back to them.  Dont use sarcasm as a weapon   Set clear limits on behavior in a calm voice ("You cannot hit the nurse.")  Offer choices " "and optimism ("Which toothpaste would you like to use today?")  Attempt to redirect Ms. Manuel to an alternative behavior ("Can you come help me with this?)  Avoid saying things like, "We already went over this!" "You can't keep doing this." "I already explained this to you"  Instead, simply nod calmly and acknowledge what Ms. Manuel is saying ("Yes, that makes sense."), and then request their help or company in a different behavior.   Having a mental list of activities Ms. Manuel enjoys can be helpful with redirection. For example, do they enjoy going for walks? Eating a piece of candy?   If redirection becomes challenging, you can place objects that your family member enjoys strategically in the home in order to use for distraction. This is particularly useful if there are items that they often talk about or frequently ask you questions about; or if they are visually striking. Once you focus the person on this object, talk about it briefly until they are calm and then attempt to redirect to a new behavior.   Sometimes activities which are repetitive can be calming, particularly if there is a comforting sensory element. For example, pt may enjoy folding soft towels or laundry.  Consider using music they know and enjoy such as their favorite songs and artists.  Limit overstimulation  Decrease distractions by turning off the TV, computer, any fluorescent lights that hum, etc.  Ask any casual visitors to leave--the fewer people the better  If the person is very agitated, avoid touching them, and respect their personal space  Sit down and ask the person to sit down also    Preventative strategies:  Try to help the patient develop a routine and stick to it  Address all immediate safety issues  Does Ms. Manuel still have access to the car and keys? Take the keys away, or disconnect the car battery.  Are they wandering at night? Install locks on the outside doors. A keypad lock works well. Alarms on bedroom " "doors can also be helpful.   Are they turning on the stove and risking a fire? If you cannot limit their access to the kitchen, you may need to unplug dangerous devices any time you are not in the room.   If Ms. Manuel is exhibiting poor judgment throughout the day, they likely need someone supervising them at all times.   Do they still have access to significant financial assets? Limit their access to accounts, and consult with a  if necessary.   Identify situations that seem to trigger agitation or a problematic behavior, and modify the person's environment to minimize or eliminate that trigger.   For example, is Ms. Manuel's behavior worse if they don't get a good night's sleep? Or if they don't eat or drink enough? If so, prioritize those activities and build behavior plans to support them.  Do they get upset about not being allowed to answer the phone when it rings? Put all of the phones in the house on "silent."   Do they become paranoid that certain family members are trying to take advantage of them? Limit contact with the targeted family member as much as possible, and re-introduce them slowly after some time has passed.   Encourage independence in daily living whenever safely possible  Encourage collaborative decision-making regarding her care as well as daily activities  Encourage regular physical exercise  Address issues that may be impacting sleep   Ex: Urology consult for frequent nighttime urination, address chronic pain that may be interfering with sleep, moving to a different room if her roommate snores loudly, make sure the room is a comfortable temperature and she has adequate pillows and blankets  Ensure she gets out into the sunlight at least once per day to encourage regular circadian cycle  No caffeine, sugar, or large meals within two hours of bedtime   Make sure room at night is dark and quiet and minimize nighttime interruptions from staff or caregivers unless medically " necessary   Try to help pt go to sleep and wake up at the same time every day  Monitor closely for worsening psychiatric symptoms (insomnia, social withdrawal, deterioration of personal hygiene, hostility, confusing or nonsensical speech, paranoia, hallucinations) and intervene promptly. Assess for possible antecedents (possible causes in their environment that came before the symptom), modify environment appropriately.      Sleep Hygiene: Poor sleep has a negative effect on cognition. Several strategies have been shown to improve sleep:   Caffeine intake in the afternoon and evening, as well as stuffing oneself at supper, can decrease the quality of restful sleep throughout the night.   Bedtime and wake-up times should be consistent every night and morning so the body becomes used to a single routine, even on the weekends.  Engage in daily physical activity, but not 2-3 hours before bedtime.   No technology use (television, computer, iPad) 1-2 hours before bed.   Have a wind down routine (e.g., soft lights in the house, bath before bed, reduced fluid intake, songs, reading, less noise) to promote sleep readiness.   Visit the www.sleepfoundation.org for more strategies.     Practice good cognitive/brain health hygiene:  Engage in regular exercise, which increases alertness and arousal and can improve attention and focus.  Consider lower impact exercises, such as yoga or light walking.  Get a good nights sleep, as this can enhance alertness and cognition.  Eat healthy foods and balanced meals. It is notable that research indicates certain nutrients may aid in brain function, such as B vitamins (especially B6, B12, and folic acid), antioxidants (such as vitamins C and E, and beta carotene), and Omega-3 fatty acids. Talk with your physician or nutritionist about whats right for you.   Keep your brain active. Find activities to stay mentally active, such as reading, games (cards, checkers), puzzles (crosswords,  Sudoku, jig saw), crafts (Good[employer]etito, woodworking), gardening, or participating in activities in the community.  Stay socially engaged. Continue staying active with your family and friends.    Resources:   Alzheimer's Services [facility] (www.http://alzbr.org) have good local caregiver and patient resources.   Consider resources for support through the [facility] Office of Elderly Affairs (http://goea.[place].gov/), [place] Chapter of the Alzheimers Association (www.alz.org/[place]/), the Family Caregiver [place] (www.caregiver.org), and the American Psychological Association (http://www.apa.org/pi/about/publications/caregivers/consumers/index.aspxconsumers/index.aspx).    Prepare for the future:  The pt and caregivers should consider formal arrangements to allow a designated person to make medical and financial decisions for the pt, should he/she become unable to do so.  Options to consider include designating a healthcare proxy, medical and/or financial power of , and completing advanced directives for healthcare decisions and estate planning (e.g., finalizing a will).  If cost is prohibitive, [facility] [employer] Services (https://iDentiMob.org/) provides free  for individuals with low income.      Ms. Manuel will be provided the results of the evaluation.     Thank you for allowing me to participate in Ms. Gordon care.  If you have any questions, please contact me at [phone].        _____________________  Levi Redding, Ph.D.  Neuropsychologist  Department of Neuropsychology  [facility], [place]    HISTORY OF PRESENT ILLNESS: Ms. Krysta Manuel is an 68 y.o., right-handed, -American female with 9 years of education who was referred for a neuropsychological evaluation in the setting of a right basal ganglia stroke in 2010 and more-recent onset of depression and memory loss.      Onset of Cognitive Concerns: Insidious, beginning approximately one year ago,  predominately as observed changes in mood.     Course of Cognitive Concerns: Ms. Manuel and her daughter reported that her cognitive skills have been slowly worsening. Ms. Manuel reported that things tend to worsen with depressed mood and panic .     Characterization of Cognitive Concerns  Attention/ working memory: Ms. Manuel denied attention or working memory concerns.  Processing speed: Ms. Manuel denied slowing of processing speed.  Language: Ms. Manuel denied language difficulties. Her daughter discussed some worsening of word-finding difficulty.  Both denied receptive language difficulty.   Visual-spatial/ navigation: Ms. Manuel denied visuospatial changes. There are reports of her feeling lost and turned around. She has had difficulty discriminating cars from trucks, and mild difficulty with depth perception.   Psychomotor: Ms. Manuel She has left sided hemiparesis. She had difficulty with motor sequencing and difficulty following complex instruction possibly associated with vision loss. Ms. Manuel and her daughter also discussed errors in movement, e.g. reaching with her palm supinated instead of pronated and backward-bending when she intends to use the toilet.   Memory: Ms. Manuel denied difficulties with memory. Her daughter discussed difficulties with memory for recent conversations and recent events, with frequent benefit from reminder cueing. Ms. Manuel recalls well-learned information well.   Decision making: Ms. Manuel has greater difficulty understanding abstraction, e.g. becoming angry when her family makes jokes because she does not realize that they are expressing irony.   Orientation: Ms. Manuel denied errors in orientation to person, place, time, or situation. She uses a calendar at home.      Neuropsychiatric Symptoms:  Hallucinations: Denied  Delusional/Paranoid Thinking: Denied  Apathy: Denied  Irritability/Agitation: Denied but her daughter discussed this  "symptom, which leads to difficulty engaging in therapy.  Disinhibition: Denied, but her daughter discussed these symptoms on occasion.   Depression/Labile Mood: Ms. Manuel discussed that she feels "a little sad" 2-3 days a week. She discussed very frequent anhedonia, social withdrawal, and psychomotor retardation. She discussed that lack of family contact is a major stressor for her.   Anxiety: Ms. Manuel reported possible panic attacks happening a few times per week with diaphoresis, increased respiration, and dilated pupils.      DAILY FUNCTIONING:  Living Environment: In a family home in [place] with her daughter and grandchild.      BASIC ADLS:  Feeding: independent  Dressing: independent  Bathing: Requires assistance  Toileting: independent with use of a bedside commode.      IADLS:  Support System: Her daughter  Appointment Management: She gets occasional reminders.   Medication Compliance: Her daughter fills the pill-box and monitors medications because Ms. Manuel prefers to not take some of her medications.   Financial Management: Her daughter has managed finances since her stroke. There have been past instances of over-spending.   Cooking: dependent due to physical changes after the stroke.   Driving: She has never driven.        BRAIN HEALTH RISK FACTORS:  Hearing Loss: Denied  Physical Activity: Denied  Social Isolation: Endorsed  Falls: Denied  Sleep: She sleeps from 22:30-09:00, without reported sleep apnea or parasomnia.     MEDICAL HISTORY: Ms. Manuel  has a past medical history of Arthritis, Carotid artery disease, Depression, DM II (diabetes mellitus, type II), controlled (12 years), Heart disease, HTN (hypertension), Hyperlipidemia, Hypothyroidism, Insomnia, Stroke (2010), and Vitamin D deficiency.    NEUROIMAGING:  Results for orders placed or performed during the hospital encounter of 05/25/23   MRI Brain Without Contrast    Narrative    EXAMINATION:  MRI BRAIN WITHOUT " CONTRAST    CLINICAL HISTORY:  Memory loss.  Behavioral changes.  Unspecified dementia, unspecified severity, without behavioral disturbance, psychotic disturbance, mood disturbance, and anxietyMemory loss;Mental status change, unknown cause;    TECHNIQUE:  Standard multiplanar noncontrast sequences of the brain.    COMPARISON:  None    FINDINGS:  The ventricles are mildly enlarged consistent with mild atrophy.    There is a large chronic appearing deep right basal ganglia/periventricular white matter infarct consistent with a large chronic appearing lacunar infarct.    No hemorrhagic transformation or hemosiderin deposition.    The diffusion-weighted sequence is normal.  No acute findings.    Pituitary gland region is normal.    There is chronic mucosal thickening within the right maxillary sinus.    The diffusion sequence is negative.      Impression    No acute findings.    Mild atrophy.    Large chronic right basal ganglia/periventricular white matter infarct with associated white matter degeneration.    Chronic right maxillary sinus mucosal thickening.      Electronically signed by: Abhay Hancock MD  Date:    05/25/2023  Time:    11:47       Current medications: Ms. Manuel has a current medication list which includes the following prescription(s): aspirin, true metrix glucose test strip, carvedilol, clopidogrel, diltiazem, donepezil, trulicity, easy touch lancing device, fluticasone propionate, gabapentin, glipizide, hydralazine, hydrochlorothiazide, hydrocodone-acetaminophen, irbesartan, lamotrigine, lancing device with lancets, lantus solostar u-100 insulin, levothyroxine, linaclotide, memantine, metformin, pen needle, diabetic, pioglitazone, polyethylene glycol, rosuvastatin, sertraline, and zolpidem.     Review of patient's allergies indicates:  No Known Allergies    HISTORY OF PRESENT ILLNESS: Ms. Krysta Manuel is an 68 y.o., right-handed, -American female with 9 years of education who was  referred for a neuropsychological evaluation in the setting of a right basal ganglia stroke in 2010 and more-recent onset of depression and memory loss.      Onset of Cognitive Concerns: Insidious, beginning approximately one year ago, predominately as observed changes in mood.     Course of Cognitive Concerns: Ms. Manuel and her daughter reported that her cognitive skills have been slowly worsening. Ms. Manuel reported that things tend to worsen with depressed mood and panic.     Characterization of Cognitive Concerns  Attention/ working memory: Ms. Manuel denied attention or working memory concerns.  Processing speed: Ms. Manuel denied slowing of processing speed.  Language: Ms. Manuel denied language difficulties. Her daughter discussed some worsening of word-finding difficulty.  Both denied receptive language difficulty.   Visual-spatial/ navigation: Ms. Manuel denied visuospatial changes. There are reports of her feeling lost and turned around. She has had difficulty discriminating cars from trucks, and mild difficulty with depth perception.   Psychomotor: Ms. Manuel She has left sided hemiparesis. She had difficulty with motor sequencing and difficulty following complex instruction possibly associated with vision loss. Ms. Manuel and her daughter also discussed errors in movement, e.g. reaching with her palm supinated instead of pronated and backward-bending when she intends to use the toilet.   Memory: Ms. Manuel denied difficulties with memory. Her daughter discussed difficulties with memory for recent conversations and recent events, with frequent benefit from reminder cueing. Ms. Manuel recalls well-learned information well.   Decision making: Ms. Manuel has greater difficulty understanding abstraction, e.g. becoming angry when her family makes jokes because she does not realize that they are expressing irony.   Orientation: Ms. Manuel denied errors in orientation to  "person, place, time, or situation. She uses a calendar at home.      Neuropsychiatric Symptoms:  Hallucinations: Denied  Delusional/Paranoid Thinking: Denied  Apathy: Denied  Irritability/Agitation: Denied but her daughter discussed this symptom, which leads to difficulty engaging in therapy.  Disinhibition: Denied, but her daughter discussed these symptoms on occasion.   Depression/Labile Mood: Ms. Manuel discussed that she feels "a little sad" 2-3 days a week. She discussed very frequent anhedonia, social withdrawal, psychomotor retardation. She discussed that lack of family contact is a major stressor for her.   Anxiety: Ms. Manuel reported possible panic attacks happening a few times per week with diaphoresis, increased respiration, and dilated pupils.      DAILY FUNCTIONING:  Living Environment: In a home in [place] with her daughter and grandchild.      BASIC ADLS:  Feeding: independent  Dressing: independent  Bathing: Requires assistance  Toileting: independent with use of a bedside commode.      IADLS:  Support System: Her daughter  Appointment Management: She gets occasional reminders.   Medication Compliance: Her daughter fills the pill-box and monitors medications because Ms. Manuel prefers to not take some of her medications.   Financial Management: Her daughter has managed finances since her stroke. There have been past instances of over-spending.   Cooking: dependent due to physical changes after the stroke.   Driving: She has never driven.        BRAIN HEALTH RISK FACTORS:  Hearing Loss: Denied  Physical Activity: Denied  Social Isolation: Endorsed  Falls: Denied  Sleep: She sleeps from 10:30-09:00    MENTAL STATUS AND OBSERVATIONS:  APPEARANCE: Casually dressed and adequate grooming/hygiene.   ALERTNESS/ORIENTATION: Attentive and alert. Ms. Manuel was fully oriented to person, place, time, and situation.   GAIT/MOTOR: Ms. Manuel was transported in a wheelchair. She sat with her left " arm in her lap, immobile. She demonstrated adequate movement of her left leg. When attention was drawn to interview, she had noticeable right-leg shaking/ foot tapping. Ms. Manuel was able to mirror but not persist on the [place] 3-step with her right hand.   SENSORY: Vision and hearing were adequate for testing purposes.   SPEECH/LANGUAGE: Normal in rate, rhythm, tone, and volume. Expressive and receptive language were grossly intact.  MOOD/AFFECT: Mood was depressed and affect was blunted and mood-congruent, with occasional smiling or laughter when appropriate.   INTERPERSONAL BEHAVIOR: Rapport was quickly and easily established   THOUGHT PROCESSES: Thoughts seemed logical and goal-directed.   TESTING OBSERVATIONS: Ms. Manuel did all that was asked of her without complaint or criticism. She spoke little during the examination unless prompted to do so, with the exception of brightening to speak about her family. On a test of timed set-shifting and multitasking the examiner discontinued the test early due to marked difficulty, frustration, and apparent inability to accomplish the test. Although normative data are not presented in the table below, these observations and her number of errors are interpreted to reflect impairment. Due to hemiparesis, only right hand motor tasks were administered.     RESULTS     Tests Administered (manual norms used unless otherwise indicated): [employer] - Test of Premorbid Functioning (TOPF), Dot Counting Test (DCT), Wechsler Adult Intelligence Scale 4th Ed. (WAIS-IV) select subtests, Controlled Oral Word Association Test (COWAT; Lina norms), Semantic Fluency (Animals; Lina norms), Neuropsychological Assessment Battery (NAB) Auditory Comprehension and Naming , Castellano Verbal Learning Test, Revised (HVLT-R), Wechsler Memory Scale, 4th Ed. Logical Memory (WMS IV-LM), Brief Visuospatial Memory Test, Revised (BVMT-R), Trail Making Test (TMT A/B; Lina norms),  Grooved Pegboard Test ([facility] norms, right hand only), Activities of Daily Living Questionnaire (ADL-Q; caregiver form), Geriatric Depression Scale (GDS) and Corbett Anxiety Inventory (ALEK).    Score Label T-Score Standard Score Z-Score Scaled Score %ile Rank   Exceptionally High > 70 > 130 > 2.0  > 16 > 98   Above Average 64-69 120-129 1.4-1.9 15 91-97   High Average 57-63 110-119 0.7-1.3 12-14 75-90   Average 44-56  0.6 to -0.6 8-11 25-74   Low Average 37-43 80-89 -1.3 to -0.7 6-7 9-24   Below Average 30-36 70-79 -2.0 to -1.4 4-5 2-8   Exceptionally Low < 30 < 70  < -2.0 < 4 < 2       Performance Validity: Ms. Manuel completed both stand-alone and embedded measures of task engagement. Below-cutoff performance on any one stand-alone measure and/ or any two embedded measures of task engagement is highly unlikely to occur outside the context of poor or inconsistent effort during testing. Ms. Manuel scored below predetermined cutoff on one embedded measure of task engagement and above predetermined cutoffs on all other administered measures of task engagement. As such, there is no evidence to suggest poor or inconsistent engagement and their performance is deemed to be a reasonable reflection of their day-to-day cognitive status.     PREMORBID FUNCTIONING Raw Score Type of Standardized Score Standardized Score Percentile/CP Descriptor   TOPF simple dem. eFSIQ - SS 70 2 Below Average   TOPF pred. eFSIQ - SS 74 4 Below Average   TOPF simple + pred. eFSIQ - SS 71 3 Below Average   INTELLECTUAL FUNCTIONING Raw Score Type of Standardized Score Standardized Score Percentile/CP Descriptor   WAIS-IV         VCI - SS 76 5 Below Average   OXANA - SS 75 5 Below Average   WMI - SS 77 6 Below Average   PSI - SS 74 4 Below Average   FSIQ - SS 71 3 Below Average   LANGUAGE FUNCTIONING Raw Score Type of Standardized Score Standardized Score Percentile/CP Descriptor   WAIS-IV Vocabulary 13 ss 4 2 Below Average   TOPF Word  Reading 16 SS 76 5 Below Average   NAB Auditory Comprehension 87 Tscore 56 73 Average   NAB Auditory Comprehension Colors 13 - - 100 WNL   NAB Auditory Comprehension Shapes 22 - - 100 WNL   NAB Auditory Comprehension Colors/Shapes/Numbers 21 - - 100 WNL   NAB Auditory Comprehension Pointing 6 - - 100 WNL   NAB Auditory Comprehension Yes/No 10 - - 100 WNL   NAB Auditory Comprehension Paper Folding 15 - - 39 Average   NAB Naming 26 Tscore 35 7 Below Average   NAB Naming Percent Correct After Semantic Cuing 20 - - 44 Average   NAB Naming Percent Correct After Phonemic Cuing 75 - - 56 Average   CFL 35 Tscore 59 82 High Average   Animals 10 Tscore 41 18 Low Average   VISUOSPATIAL FUNCTIONING Raw Score Type of Standardized Score Standardized Score Percentile/CP Descriptor   WAIS-IV Block Design 12 ss 4 2 Below Average   WAIS-IV Visual Puzzles 6 ss 6 9 Low Average   BVMT-R Copy 9 - - - -   LEARNING & MEMORY Raw Score Type of Standardized Score Standardized Score Percentile/CP Descriptor   HVLT-R         Total Immediate 17 Tscore 37 9 Low Average   Delayed Recall 2 Tscore 25 0.5 Exceptionally Low    Retention % 29 Tscore 21 0.2 Exceptionally Low    Hits 6 - - - -   False Positives 1 - - - -   Discrimination  5 Tscore <20 0.1 Exceptionally Low    WMS-IV Subtests         LM I 15 ss 5 5 Below Average   LM II 2 ss 1 0.1 Exceptionally Low    LM Recognition 13 - - <2 Exceptionally Low   BVMT-R          IR (1, 1, 3/ 12) 5 Tscore 22 0.3 Exceptionally Low    DR 3 Tscore 28 1 Exceptionally Low    Retention % 100 - - >16 WNL   Discrimination Index 3 - - 3-5 Below Average   ATTENTION/WORKING MEMORY Raw Score Type of Standardized Score Standardized Score Percentile/CP Descriptor   WAIS-IV Digit Span 17 ss 6 9 Low Average         DS Forward 6 ss 5 5 Below Average         DS Backward 4 ss 5 5 Below Average         DS Sequence 7 ss 9 37 Average         Longest Digit Forward 4 - - - -         Longest Digit Backward 2 - - - -         Longest  "Digit Sequence 6 - - - -   WAIS-IV Arithmetic 9 ss 6 9 Low Average   MENTAL PROCESSING SPEED Raw Score Type of Standardized Score Standardized Score Percentile/CP Descriptor   WAIS-IV Symbol Search 20 ss 7 16 Low Average   WAIS-IV Coding 19 ss 3 1 Exceptionally Low    TMT A  35 Tscore 57 76 High Average   TMT A errors 0 - - - -   EXECUTIVE FUNCTIONING Raw Score Type of Standardized Score Standardized Score Percentile/CP Descriptor   TMT B D/C @179" Tscore - - -   TMT B errors 7 - - - -   WAIS-IV Similarities 17 ss 7 16 Low Average   WAIS-IV Matrix Reasoning 8 ss 7 16 Low Average   FRONTOMOTOR  Raw Score Type of Standardized Score Standardized Score Percentile/CP Descriptor   GPT  Tscore 47 38 Average   GPD NDH N/A Tscore N/A N/A N/A   FUNCTIONAL  Raw Score Type of Standardized Score Standardized Score Percentile/CP Descriptor   ADLQ Self Care Activities - Percent - 56 Moderate Impairment   ADLQ Household Care - Percent - 100 Severe Impairment   ADLQ Employment & Recreation - Percent - 100 Severe Impairment   ADLQ Shopping & Money - Percent - 100 Severe Impairment   ADLQ Travel - Percent - 100 Severe Impairment   ADLQ Communication - Percent - 58 Moderate Impairment   MOOD & PERSONALITY Raw Score Type of Standardized Score Standardized Score Percentile/CP Descriptor   GDS-30 12 - - - Mild   ALEK 17 - - - Mild   ss = scaled score (mean = 10, SD = 3); SS = standard score (mean = 100, SD = 15); Tscore mean = 50, SD = 10; zscore (mean = 0.00, SD = 1)     Billing Documentation     Time spent conducting face to face interview with the patient: 47 minutes; billed separately.  Time on review of neuropsychological test data and relevant records, data interpretation, and writing/ documentation: 161 minutes; [id number] [id number] (x2).  Psychometrist time spent in the administration and scoring of 2 or more neuropsychological tests 224 minutes; [id number] [phone] [id number] (x6).     Referral Diagnoses:  Z86.73 (ICD-10-CM) - History of stroke "   F02.811 (ICD-10-CM) - Major neurocognitive disorder due to multiple etiologies, with agitation   R45.1 (ICD-10-CM) - Agitation

## 2023-11-27 ENCOUNTER — OFFICE VISIT (OUTPATIENT)
Dept: NEUROLOGY | Facility: CLINIC | Age: 68
End: 2023-11-27
Payer: MEDICARE

## 2023-11-27 DIAGNOSIS — F41.9 ANXIETY: ICD-10-CM

## 2023-11-27 DIAGNOSIS — E03.9 HYPOTHYROIDISM (ACQUIRED): ICD-10-CM

## 2023-11-27 DIAGNOSIS — Z86.73 HISTORY OF STROKE: ICD-10-CM

## 2023-11-27 DIAGNOSIS — Z91.89 AT RISK FOR PROLONGED QT INTERVAL SYNDROME: ICD-10-CM

## 2023-11-27 DIAGNOSIS — F03.90 DEMENTIA WITHOUT BEHAVIORAL DISTURBANCE: ICD-10-CM

## 2023-11-27 DIAGNOSIS — F32.5 MAJOR DEPRESSION IN REMISSION: ICD-10-CM

## 2023-11-27 DIAGNOSIS — I69.354 HEMIPARESIS AFFECTING LEFT SIDE AS LATE EFFECT OF CEREBROVASCULAR ACCIDENT: ICD-10-CM

## 2023-11-27 DIAGNOSIS — F33.1 DEPRESSION, MAJOR, RECURRENT, MODERATE: ICD-10-CM

## 2023-11-27 DIAGNOSIS — F32.9 MAJOR DEPRESSIVE DISORDER, REMISSION STATUS UNSPECIFIED, UNSPECIFIED WHETHER RECURRENT: ICD-10-CM

## 2023-11-27 DIAGNOSIS — G47.00 INSOMNIA, UNSPECIFIED TYPE: ICD-10-CM

## 2023-11-27 DIAGNOSIS — F02.811 MAJOR NEUROCOGNITIVE DISORDER DUE TO MULTIPLE ETIOLOGIES, WITH AGITATION: Primary | ICD-10-CM

## 2023-11-27 PROCEDURE — 4010F ACE/ARB THERAPY RXD/TAKEN: CPT | Mod: CPTII,95,, | Performed by: NURSE PRACTITIONER

## 2023-11-27 PROCEDURE — 3066F PR DOCUMENTATION OF TREATMENT FOR NEPHROPATHY: ICD-10-PCS | Mod: CPTII,95,, | Performed by: NURSE PRACTITIONER

## 2023-11-27 PROCEDURE — 3061F NEG MICROALBUMINURIA REV: CPT | Mod: CPTII,95,, | Performed by: NURSE PRACTITIONER

## 2023-11-27 PROCEDURE — 3051F HG A1C>EQUAL 7.0%<8.0%: CPT | Mod: CPTII,95,, | Performed by: NURSE PRACTITIONER

## 2023-11-27 PROCEDURE — 1159F PR MEDICATION LIST DOCUMENTED IN MEDICAL RECORD: ICD-10-PCS | Mod: CPTII,95,, | Performed by: NURSE PRACTITIONER

## 2023-11-27 PROCEDURE — 3051F PR MOST RECENT HEMOGLOBIN A1C LEVEL 7.0 - < 8.0%: ICD-10-PCS | Mod: CPTII,95,, | Performed by: NURSE PRACTITIONER

## 2023-11-27 PROCEDURE — 3066F NEPHROPATHY DOC TX: CPT | Mod: CPTII,95,, | Performed by: NURSE PRACTITIONER

## 2023-11-27 PROCEDURE — 99215 PR OFFICE/OUTPT VISIT, EST, LEVL V, 40-54 MIN: ICD-10-PCS | Mod: 95,,, | Performed by: NURSE PRACTITIONER

## 2023-11-27 PROCEDURE — 1159F MED LIST DOCD IN RCRD: CPT | Mod: CPTII,95,, | Performed by: NURSE PRACTITIONER

## 2023-11-27 PROCEDURE — 99215 OFFICE O/P EST HI 40 MIN: CPT | Mod: 95,,, | Performed by: NURSE PRACTITIONER

## 2023-11-27 PROCEDURE — 1160F PR REVIEW ALL MEDS BY PRESCRIBER/CLIN PHARMACIST DOCUMENTED: ICD-10-PCS | Mod: CPTII,95,, | Performed by: NURSE PRACTITIONER

## 2023-11-27 PROCEDURE — 1160F RVW MEDS BY RX/DR IN RCRD: CPT | Mod: CPTII,95,, | Performed by: NURSE PRACTITIONER

## 2023-11-27 PROCEDURE — 4010F PR ACE/ARB THEARPY RXD/TAKEN: ICD-10-PCS | Mod: CPTII,95,, | Performed by: NURSE PRACTITIONER

## 2023-11-27 PROCEDURE — 3061F PR NEG MICROALBUMINURIA RESULT DOCUMENTED/REVIEW: ICD-10-PCS | Mod: CPTII,95,, | Performed by: NURSE PRACTITIONER

## 2023-11-27 RX ORDER — ZOLPIDEM TARTRATE 5 MG/1
5 TABLET ORAL NIGHTLY
Qty: 90 TABLET | Refills: 1 | Status: SHIPPED | OUTPATIENT
Start: 2023-11-27 | End: 2023-12-12 | Stop reason: SDUPTHER

## 2023-11-27 NOTE — PROGRESS NOTES
Subjective:       Patient ID: Krysta Manuel is a 68 y.o. female.    Chief Complaint: No chief complaint on file.      Referred by PCP: Dr. Wanda Cook, DO    HPI  The patient is new to me, she is here for memory loss and behavior changes. The patient is accompanied by daughter/caretaker. The patient is presenting memory changes and behavior changes  that began in 10/2022, per daughter it has worsened since then. The main problems the patient has are related to progressive short term memory loss and changes in behavior. For example, the patient would repeat the same question over and over again. The patient forget conversations had with family.  The patient is not forgetting where placed certain things, daughter states she don't manage anything since having the stroke, her daughter provides her with assistance and care. Patient is very inactive. She talks to herself, easily upset. The patient do not drive, and never learned to drive. No confusion around and inside the house. No trouble remembering the date and time. Patient daughter is managing patients medications and appointments. Patient is aware of major holidays and political changes. The patient is daughter is handling finances. The patient requires assistance with ADLs. Daughter prepare meals, daughter provide assistance with baths. Patient able to feed her self.  No hallucinations or delusions. No seizures. Agitation behavioral problems. No problems handling tools. 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 Ambulate short distances with cane and uses wheelchair. Stroke Risk factors Risck factors HTN, HLD, DM, No history of headaches. Hx of  hypothyroidism. Chronic DM type II. PA fib takes Cardizem.  No history of alcoholism. No history of B12 deficiency. Chronic anxiety and depression Zoloft. Referral to physiatrists was missed No history of STDs.  No history of HIV infection. No toxic  exposures. Left arm Hemiparesis, Stroke side  arm has abnormal movements at times per daughter. Left arm flaccid, gait instability. Urgency nocturia urinary incontinence. Takes ambiens for sleep partially helpful. Patient has decreased appetite. Multiple Siblings have dementia living ages (79, 75, onset). She was started on Namenda 10 mg BID by PCP on ,  tolerating well. Today MOCA 05- 25/30.  08-: Patient present for follow up for memory with behavioral management. Patient accompanied by daughter. Patient is tolerating Namenda 10 mg BID no side effects. Tolerating Donepezil/Aricept 5 mg QHS no side effects. Daughter reports the  LTG 25 mg po BID is well tolerated but no changes noted in agitation and mood stabilization.Discussed plan of care.     Patient was previously managed by Dr. Pierre. He was prescribed patient Ambien 5 mg po HS for sleep( formerly managed by Dr. Pierre, who is no longer provider) Patient daughter request refill for Ambien, patient failed Trazodone but does well on Ambien 5 mg po no side effects. She is pending follow up for physiatry.     INTERVAL NOTE 11-: Patient present for follow up for memory with behavioral management. Patient accompanied by daughter. Patient is tolerating Namenda 10 mg BID no side effects. Tolerating Donepezil/Aricept 10 mg QHS no side effects. Daughter reports the   mg daily is well tolerated no agitation. Discussed plan of care.     Patient Ambien 5 mg po HS for sleep.   Refills requested.      Review of Systems   Unable to perform ROS: Dementia   Constitutional:  Positive for activity change.   HENT:          Edentulous    Eyes: Negative.    Respiratory: Negative.     Cardiovascular: Negative.    Gastrointestinal: Negative.    Endocrine: Negative.    Genitourinary:  Positive for urgency.   Musculoskeletal:  Positive for arthralgias, back pain, gait problem and myalgias.   Allergic/Immunologic: Negative.    Neurological:  Positive for  facial asymmetry, speech difficulty, weakness and numbness.   Hematological: Negative.    Psychiatric/Behavioral:  Positive for agitation, behavioral problems, confusion, decreased concentration, dysphoric mood and sleep disturbance.                  Current Outpatient Medications:     aspirin (ECOTRIN) 81 MG EC tablet, Take 1 tablet (81 mg total) by mouth once daily., Disp: 30 tablet, Rfl: 11    blood sugar diagnostic (TRUE METRIX GLUCOSE TEST STRIP) Strp, USE 3 TIMES DAILY, Disp: 100 each, Rfl: 11    carvediloL (COREG) 25 MG tablet, Take 25 mg by mouth 2 (two) times daily with meals., Disp: , Rfl:     clopidogreL (PLAVIX) 75 mg tablet, Take 75 mg by mouth once daily., Disp: , Rfl:     diltiaZEM (DILACOR XR) 240 MG CDCR, Take 240 mg by mouth once daily., Disp: , Rfl:     donepeziL (ARICEPT) 10 MG tablet, Take 1 tablet (10 mg total) by mouth every evening., Disp: 30 tablet, Rfl: 11    dulaglutide (TRULICITY) 4.5 mg/0.5 mL pen injector, INJECT 4.5 MG INTO THE SKIN EVERY 7 DAYS., Disp: 4 pen , Rfl: 5    EASY TOUCH LANCING DEVICE Misc, , Disp: , Rfl:     fluticasone propionate (FLONASE) 50 mcg/actuation nasal spray, 2 SPRAYS IN EACH NOSTRIL EVERY DAY AS NEEDED, Disp: 16 g, Rfl: 1    gabapentin (NEURONTIN) 100 MG capsule, Take 100 mg by mouth., Disp: , Rfl:     glipiZIDE (GLUCOTROL) 10 MG tablet, TAKE ONE TABLET BY MOUTH TWO TIMES A DAY BEFORE MEALS, Disp: 60 tablet, Rfl: 5    hydrALAZINE (APRESOLINE) 25 MG tablet, Take 1 tablet (25 mg total) by mouth every 12 (twelve) hours., Disp: 60 tablet, Rfl: 1    hydroCHLOROthiazide (HYDRODIURIL) 25 MG tablet, TAKE 1 TABLET (25 MG TOTAL) BY MOUTH ONCE DAILY., Disp: 90 tablet, Rfl: 3    HYDROcodone-acetaminophen (NORCO)  mg per tablet, , Disp: , Rfl: 0    irbesartan (AVAPRO) 300 MG tablet, TAKE 1 TABLET (300 MG TOTAL) BY MOUTH EVERY EVENING., Disp: 90 tablet, Rfl: 3    lamoTRIgine (LAMICTAL) 100 MG tablet, Take 1 tablet (100 mg total) by mouth once daily., Disp: 30 tablet,  "Rfl: 11    lancing device with lancets Kit, 1 each by Misc.(Non-Drug; Combo Route) route 3 (three) times daily., Disp: 200 each, Rfl: 10    LANTUS SOLOSTAR U-100 INSULIN glargine 100 units/mL SubQ pen, Inject 72 Units into the skin once daily., Disp: 30 mL, Rfl: 5    levothyroxine (SYNTHROID) 125 MCG tablet, TAKE 1 TABLET (125 MCG TOTAL) BY MOUTH BEFORE BREAKFAST., Disp: 90 tablet, Rfl: 2    linaCLOtide (LINZESS) 290 mcg Cap capsule, Take 1 capsule (290 mcg total) by mouth once daily., Disp: 30 capsule, Rfl: 11    memantine (NAMENDA) 10 MG Tab, TAKE 1 TABLET BY MOUTH 2 TIMES DAILY., Disp: 180 tablet, Rfl: 1    metFORMIN (GLUCOPHAGE-XR) 500 MG ER 24hr tablet, Take 2 tablets (1,000 mg total) by mouth 2 (two) times daily with meals., Disp: 360 tablet, Rfl: 0    pen needle, diabetic (SURE COMFORT PEN NEEDLE) 32 gauge x 5/32" Ndle, Use daily with insulin pen., Disp: 100 each, Rfl: 3    pioglitazone (ACTOS) 30 MG tablet, Take 1 tablet (30 mg total) by mouth once daily., Disp: 90 tablet, Rfl: 1    polyethylene glycol (GLYCOLAX) 17 gram/dose powder, Take 17 g by mouth once daily., Disp: 510 g, Rfl: 11    rosuvastatin (CRESTOR) 10 MG tablet, TAKE 1 TABLET (10 MG TOTAL) BY MOUTH NIGHTLY., Disp: 90 tablet, Rfl: 3    sertraline (ZOLOFT) 100 MG tablet, Take 1.5 tablets (150 mg total) by mouth once daily., Disp: 135 tablet, Rfl: 1    zolpidem (AMBIEN) 5 MG Tab, Take 1 tablet (5 mg total) by mouth nightly as needed (INSOMNIA)., Disp: 30 tablet, Rfl: 3  Past Medical History:   Diagnosis Date    Arthritis     Carotid artery disease     Dr. Jessa Szymanski--cardiology    Depression     DM II (diabetes mellitus, type II), controlled 12 years    Heart disease     Dr. Jessa Szymanski--cardiology    HTN (hypertension)     Hyperlipidemia     Hypothyroidism     Insomnia     Stroke 2010    Dr. Jessa Szymanski--cardiology    Vitamin D deficiency      Past Surgical History:   Procedure Laterality Date    CHOLECYSTECTOMY      COLONOSCOPY      " COLONOSCOPY N/A 8/15/2023    Procedure: COLONOSCOPY;  Surgeon: Fina Vieyra MD;  Location: [facility];  Service: Endoscopy;  Laterality: N/A;    HYSTERECTOMY      THYROIDECTOMY      TOTAL ABDOMINAL HYSTERECTOMY W/ BILATERAL SALPINGOOPHORECTOMY       Social History     Socioeconomic History    Marital status:    Tobacco Use    Smoking status: Never    Smokeless tobacco: Never   Substance and Sexual Activity    Alcohol use: Not Currently    Drug use: Never     Social Determinants of Health     Financial Resource Strain: Medium Risk (9/7/2023)    Overall Financial Resource Strain (CARDIA)     Difficulty of Paying Living Expenses: Somewhat hard   Food Insecurity: Food Insecurity Present (9/7/2023)    Hunger Vital Sign     Worried About Running Out of Food in the Last Year: Sometimes true     Ran Out of Food in the Last Year: Sometimes true   Transportation Needs: No Transportation Needs (9/7/2023)    PRAPARE - Transportation     Lack of Transportation (Medical): No     Lack of Transportation (Non-Medical): No   Physical Activity: Inactive (9/7/2023)    Exercise Vital Sign     Days of Exercise per Week: 0 days     Minutes of Exercise per Session: 0 min   Stress: Stress Concern Present (9/7/2023)    [employer] - Occupational Stress Questionnaire     Feeling of Stress : Very much   Social Connections: Socially Isolated (9/7/2023)    Social Connection and Isolation Panel [NHANES]     Frequency of Communication with Friends and Family: Never     Frequency of Social Gatherings with Friends and Family: Never     Attends Congregation Services: Never     Active Member of Clubs or Organizations: No     Attends Club or Organization Meetings: Never     Marital Status:    Housing Stability: Low Risk  (9/7/2023)    Housing Stability Vital Sign     Unable to Pay for Housing in the Last Year: No     Number of Places Lived in the Last Year: 2     Unstable Housing in the Last Year: No              Past/Current Medical/Surgical History, Past/Current Social History, Past/Current Family History and Past/Current Medications were reviewed in detail.        Objective:           VITAL SIGNS WERE REVIEWED      GENERAL APPEARANCE:     The patient looks comfortable, upset, cooperative.    BMI 35.76 KG     No signs of respiratory distress.    Normal breathing pattern.    No dysmorphic features    Normal eye contact.     GENERAL MEDICAL EXAM:    HEENT:  Head is atraumatic normocephalic.     No tender temporal arteries. Fundoscopic (Ophthalmoscopic) exam showed no disc edema.      Neck and Axillae: No JVD. No visible lesions.    No carotid bruits. No thyromegaly. No lymphadenopathy.    Cardiopulmonary: No cyanosis. No tachypnea. Normal respiratory effort.    Clear breath sounds. S1, S2 with regular rhythm . No murmurs.     Gastrointestinal/Urogenital:  No jaundice. No stomas or lesions. No visible hernias. No catheters.     Abdomen is soft non-tender. No masses or organomegaly.    Skin, Hair and Nails: No pathognonomic skin rash. No neurofibromatosis. No visible lesions.No stigmata of autoimmune disease. No clubbing.    Skin is warm and moist. No palpable masses.    Limbs: No varicose veins. No visible swelling.    No palpable edema. Pulses are symmetric. Pedal pulses are palpable.      Muskoskeletal: No visible deformities.No visible lesions.    No spine tenderness. No signs of longstanding neuropathy. No dislocations or fractures.            Neurologic Exam     Mental Status   Oriented to person, place, and time.   Follows 2 step commands.   Attention: decreased. Concentration: decreased.   Speech: slurred   Level of consciousness: alert  Knowledge: poor and inconsistent with education. Able to perform simple calculations.   Able to name object. Able to read. Able to repeat. Able to write. Abnormal comprehension.     Cranial Nerves     CN II   Visual fields full to confrontation.   Visual acuity:  normal  Right visual field deficit: none  Left visual field deficit: none     CN III, IV, VI   Pupils are equal, round, and reactive to light.  Extraocular motions are normal.   Right pupil: Size: 2 mm. Shape: regular. Reactivity: brisk. Consensual response: intact. Accommodation: intact.   Left pupil: Size: 2 mm. Shape: regular. Reactivity: brisk. Consensual response: intact. Accommodation: intact.   CN III: no CN III palsy  CN VI: no CN VI palsy  Nystagmus: none   Diplopia: none  Ophthalmoparesis: none  Upgaze: normal  Downgaze: normal  Conjugate gaze: present  Vestibulo-ocular reflex: present    CN V   Facial sensation intact.   Right facial sensation deficit: none  Left facial sensation deficit: complete  Left corneal reflex: abnormal    CN VII   Right facial weakness: none  Left facial weakness: peripheral    CN VIII   CN VIII normal.     CN IX, X   CN IX normal.   CN X normal.   Palate: asymmetric    CN XI   CN XI normal.   Right sternocleidomastoid strength: normal  Right trapezius strength: normal    CN XII   CN XII normal.   Tongue: not atrophic  Fasciculations: absent  Tongue deviation: left    Motor Exam   Right arm tone: normal  Left arm tone: decreased  Right arm pronator drift: absent  Left arm pronator drift: present  Right leg tone: normal  Left leg tone: decreased    Strength   Strength 5/5 throughout.   Right neck flexion: 5/5  Left neck flexion: 0/5  Right neck extension: 5/5  Left neck extension: 0/5  Right deltoid: 5/5  Left deltoid: 0/5  Right biceps: 5/5  Left biceps: 0/5  Right triceps: 5/5  Left triceps: 0/5  Right wrist flexion: 5/5  Left wrist flexion: 0/5  Right wrist extension: 5/5  Left wrist extension: 0/5  Right interossei: 5/5  Left interossei: 0/5  Right iliopsoas: 5/5  Left iliopsoas: 4/5  Right quadriceps: 5/5  Left quadriceps: 4/5  Right hamstrin/5  Left hamstrin/5  Right glutei: 5/5  Left glutei: 4/5  Right anterior tibial: 5/5  Left anterior tibial: 4/5  Right posterior  tibial: 5/5  Left posterior tibial: 4/5  Right peroneal: 5/5  Left peroneal: 4/5  Right gastroc: 5/5  Left gastroc: 4/5    Sensory Exam   Light touch normal.   Right arm light touch: normal  Left arm light touch: normal  Right leg light touch: normal  Left leg light touch: normal  Vibration normal.   Right arm vibration: normal  Left arm vibration: normal  Right leg vibration: normal  Left leg vibration: normal  Proprioception normal.   Right arm proprioception: normal  Left arm proprioception: normal  Right leg proprioception: normal  Left leg proprioception: normal  Pinprick normal.   Right arm pinprick: normal  Left arm pinprick: normal  Right leg pinprick: normal  Left leg pinprick: normal  Sensory deficit distribution on left: lateral cutaneous thigh  Graphesthesia: normal  Stereognosis: normal    Gait, Coordination, and Reflexes     Gait  Gait: normal    Coordination   Romberg: negative  Finger to nose coordination: normal  Heel to shin coordination: normal  Tandem walking coordination: normal    Tremor   Resting tremor: absent  Intention tremor: absent  Action tremor: left arm and right arm    Reflexes   Right brachioradialis: 2+  Left brachioradialis: 2+  Right biceps: 2+  Left biceps: 2+  Right triceps: 2+  Left triceps: 2+  Right patellar: 2+  Left patellar: 2+  Right achilles: 2+  Left achilles: 2+  Right : 2+  Left : 2+  Right plantar: normal  Left plantar: normal  Right Ortez: absent  Left Otrez: absent  Right ankle clonus: absent  Left ankle clonus: absent  Right pendular knee jerk: absent  Left pendular knee jerk: absent        SHIN COGNITIVE ASSESSMENT (MOCA) TOTAL SCORE 30         NORMAL-MILD NCD 26-30    MILD DEMENTIA 20-25    Recall recovered with 2 cue    +1 for education 9 th grade completion     DATE 05-       TOTAL SCORE 25       VISUOSPATIAL EXECUTIVE (5) 3       NAMING (3) 3       ATTENTION (6) 5       LANGUAGE (3) 3       ABSTRACTION(2) 1       RECALL (5) 2        ORIENTATION (6) 6             Lab Results   Component Value Date    WBC 9.38 09/06/2023    HGB 11.7 (L) 09/06/2023    HCT 37.3 09/06/2023    MCV 87 09/06/2023     09/06/2023     Sodium   Date Value Ref Range Status   09/14/2023 141 136 - 145 mmol/L Final     Potassium   Date Value Ref Range Status   09/14/2023 4.6 3.5 - 5.1 mmol/L Final     Chloride   Date Value Ref Range Status   09/14/2023 106 95 - 110 mmol/L Final     CO2   Date Value Ref Range Status   09/14/2023 24 23 - 29 mmol/L Final     Glucose   Date Value Ref Range Status   09/14/2023 108 70 - 110 mg/dL Final     BUN   Date Value Ref Range Status   09/14/2023 14 8 - 23 mg/dL Final     Creatinine   Date Value Ref Range Status   09/14/2023 1.1 0.5 - 1.4 mg/dL Final     Calcium   Date Value Ref Range Status   09/14/2023 10.9 (H) 8.7 - 10.5 mg/dL Final     Total Protein   Date Value Ref Range Status   09/06/2023 7.8 6.0 - 8.4 g/dL Final     Albumin   Date Value Ref Range Status   09/06/2023 4.2 3.5 - 5.2 g/dL Final     Total Bilirubin   Date Value Ref Range Status   09/06/2023 0.5 0.1 - 1.0 mg/dL Final     Comment:     For infants and newborns, interpretation of results should be based  on gestational age, weight and in agreement with clinical  observations.    Premature Infant recommended reference ranges:  Up to 24 hours.............<8.0 mg/dL  Up to 48 hours............<12.0 mg/dL  3-5 days..................<15.0 mg/dL  6-29 days.................<15.0 mg/dL       Alkaline Phosphatase   Date Value Ref Range Status   09/06/2023 68 55 - 135 U/L Final     AST   Date Value Ref Range Status   09/06/2023 14 10 - 40 U/L Final     ALT   Date Value Ref Range Status   09/06/2023 16 10 - 44 U/L Final     Anion Gap   Date Value Ref Range Status   09/14/2023 11 8 - 16 mmol/L Final     eGFR if    Date Value Ref Range Status   07/18/2022 >60.0 >60 mL/min/1.73 m^2 Final     eGFR if non    Date Value Ref Range Status   07/18/2022 58.0  (A) >60 mL/min/1.73 m^2 Final     Comment:     Calculation used to obtain the estimated glomerular filtration  rate (eGFR) is the CKD-EPI equation.        Lab Results   Component Value Date    [id number] 416 05/15/2023     Lab Results   Component Value Date    TSH 1.406 09/06/2023    FREET4 0.93 09/06/2023   Labs Reviewed:    05-    FA >40.0^, HIV neg, Vitamin B 12 416 NL, TSH 0.637 NL, HC 8.5 NL,       EKG Results:    05-    QT interval 396 NL, HR 81     MRI Brain WO (Care Everywhere)     01-    Chronic lacunar type infarction of the right basal ganglia, with gliosis extending into the thalamus and centrum semiovale. The ventricles are normal in size. No extra-axial fluid collections. Expected arterial flow voids are demonstrated. Visualized sinuses and mastoids are clear. Bone marrow signal is within normal limits.    MRI ANGIOGRAM HEAD NECK WO (Care Everywhere)      01-  There is no large vessel occlusion or severe stenosis.   Distal vessel wall irregularity is in part due to motion artifact, but could also represent underlying small vessel vasculopathy.    Normal MRA of the neck    CT Head WO (Care Everywhere)     01-    No CT evidence of an acute intracranial process  Old infarction right basal ganglia and corona radiata.    EEG Results:    05-    The EEG is normal in the awake and sleep states.       MRI Brain WO:    05-    No acute findings.     Mild atrophy.     Large chronic right basal ganglia/periventricular white matter infarct with associated white matter degeneration.     Chronic right maxillary sinus mucosal thickening.  Assessment:       No diagnosis found.      TModified Amor Score (m-RS)      0  The patient has no residual symptoms.    1  The patient has no significant disability; able to carry out all pre-stroke activities.    2  The patient has slight disability; unable to carry out all pre-stroke activities but able to look after self without  daily help.    3  The patient has moderate disability; requiring some external help but able to walk without the assistance of another individual.    4  The patient has moderately severe disability; unable to walk or attend to bodily functions without assistance of another individual.    5  The patient has severe disability; bedridden, incontinent, requires continuous care.    6  The patient has  (during the hospital stay or after discharge from the hospital).    Plan:         MAJOR NEUROCOGNITIVE DISORDER DUE TO MULTIPLE ETIOLOGIES WITH AGITATION/ VASCULAR VS AD DEMENTIA/  HX OF RT BASAL GANGLIA AND ENRIQUEZ RADIATA STROKE WITH DEFICITS/ MDD/DOREEN/ HYPOTHYROIDISM/        EVALUATION     Comprehensive Neuropsychological Testing.    Explained to the patient and family that medications are intended to slow down the progression and not stop it or reverse the disease.The disease is relentless, progressive and so far cannot be controlled.     I counseled the patient and family that the rate of progression is extremely variable and the average (10 years) is an inaccurate measure.     Continue memantine/Namenda 10 mg BID    Continue Donepezil/Aricept 10 mg QHS    Follow up with Psychiatry Dr. Rodriguez     SYMPTOMATIC MANAGEMENT     Continue  mg Daily  to help for agitation and mood stabilization.    Continue Ambien 5 mg po HS ( formerly managed by Dr. Pierre, who is no longer provider)  Refills sent     Future consideration:     Risperdal 1 mg QHS to assist with psychotic symptoms and behavioral disturbances   excessive sedation or paradoxical agitation.      Failed Trazodone per patient daughter.     HOME CARE     Pallative care     Falling Down Precautions. Gait is affected by the disease as well.     Avoid driving and access to firearms     Incremental 24/Care     Help with finances and decision making.    Join support group.    Proofing the house and use labeling.    Avoid antihistamines and  anticholinergics.    Avoid changing routine.    Use written reminders.    Avoid multitasking.    Healthy diet, exercise (physical and cognitive).    Good sleep hygiene.    For behavioral problems I recommended that family to try some of the following communication tips: Try not to react and stay calm, Don't argue , Do not use logic, Let the patient feel safe, Keep reminding the patient and use photos if necessary, Distract the patient, Search for things to distract the person, then talk about what you found. For example, talk about a photograph or keepsake or even food, Use gentle touching or hugging to show you care, Body language matters, Use a cooling off period if needed, when possible. If safe to do so, give the patient some space or breathing room. Will consider antipsychotic medications as a last resort because of the risk of CAD and CVD.    Recommend reading the book: The 36-Hour Day and there are many online and printed resources to help caregivers as well.     For More Information About Hallucinations, Delusions, and Paranoia in Alzheimer's   [facility] Alzheimer's and related Dementias Education and Referral (ADEAR) Center   1-822.796.7204 (toll-free)   adear@[employer].nih.gov   www.[employer].nih.gov/alzheimers   The [employer] [place] [employer]'s ADEAR Center offers information and free print publications about Alzheimer's disease and related dementias for families, caregivers, and health professionals. ADEAR Center staff answer telephone, email, and written requests and make referrals to local and national resources      PREVENTION OF DELIRIUM       1. Good hydration and avoid electrolyte imbalance  2. Recognize andtreat infections immediately especially UTI.  3. Bladder emptying and prevent constipation.   4. Provide stimulating activities and familiar objects  5. Use eyeglasses and hearing aids if needed.   6. Use simple and regular communication about people, current place and time  7. Mobility and range-of-motion  exercises  8. Reduce noise, lighting and avoid sleep interruptions  9. Non-narcotic pain management.  10.Nondrug treatment for sleep problems or anxiety  11. Avoid antihistamines.  12. Avoid narcotics.  13. Avoid benzodiazepines.       HX OF RT BRAIN BASAL GANGLIA AND ENRIQUEZ RADIATA STROKE WITH DEFICIT DYSARTHRIA, FACIAL DROOP LEFT SIDE, LEFT UPPER ARM PARAYRALTS LEFT  SIDE HEMIPARESIS    (2010 Right Brain Stroke with deficts left facial droop, Dysarthria, Left ARM HEMPARSIS, LEFT sided weakness/ Stroke Risk factors Risck factors HTN, HLD, DM)    Vascular Risk Factors (VRFs) stratification (BP, BS, BC control and Smoking Cessation) is the mainstay of stroke prevention (>90%). The benefit of antiplatelets is < 20% stroke risk reduction.       Wheelchair, Cane     Healthy diet and exercise    Avoid driving.    Call 911 if any SUDDEN:   Weakness  Numbness  Slurring of speech  Speech difficulty   Vertigo  Loss of balance  Loss of vision  Loss of hearing  Double vision  Trouble swallowing  Trouble breathing  Facial drooping        MEDICAL/SURGICAL COMORBIDITIES     All relevant medical comorbidities noted and managed by primary care physician and medical care team.          MISCELLANEOUS MEDICAL PROBLEMS       HEALTHY LIFESTYLE AND PREVENTATIVE CARE    Encouraged the patient to adhere to the age-appropriate health maintenance guidelines including screening tests and vaccinations.     Discussed the overall importance of healthy lifestyle, optimal weight, exercise, healthy diet, good sleep hygiene and avoiding drugs including smoking, alcohol and recreational drugs. The patient verbalized full understanding.       Advised the patient to follow COVID-19 prevention measures.       I spent 42 minutes  more than 50 % spent face to face with the patient  Time spent in counseling and coordination of care including discussions etiology of diagnosis, pathogenesis of diagnosis, prognosis of diagnosis,, diagnostic results,  impression and recommendations, diagnostic studies, management, risks and benefits of treatment, instructions of disease self-management, treatment instructions, follow up requirements, patient and family counseling/involvement in care compliance with treatment regimen. All of the patient's questions were answered during this discussion.       Zahraa Laurent, MSN NP      Collaborating Provider: Calin Matthew MD, FAAN Neurologist/Epileptologist

## 2023-12-08 DIAGNOSIS — I10 HYPERTENSION GOAL BP (BLOOD PRESSURE) < 130/80: ICD-10-CM

## 2023-12-08 NOTE — TELEPHONE ENCOUNTER
Care Due:                  Date            Visit Type   Department     Provider  --------------------------------------------------------------------------------                                MYCHART                              FOLLOWUP/OF  [facility]  Last Visit: 10-      FICE VISIT   NIKITA Cook                              EP -                              PRIMARY      [facility]  Next Visit: 01-      CARE ([facility])   NIKITA Cook                                                            Last  Test          Frequency    Reason                     Performed    Due Date  --------------------------------------------------------------------------------    HBA1C.......  6 months...  metFORMIN................  09- 03-    [employer] Embedded Care Due Messages. Reference number: [id number].   12/08/2023 8:01:23 AM CST

## 2023-12-08 NOTE — TELEPHONE ENCOUNTER
Refill Routing Note   Medication(s) are not appropriate for processing by [facility] for the following reason(s):        Required vitals abnormal  New or recently adjusted medication    [facility] action(s):  Defer        Medication Therapy Plan: FLOS      Appointments  past 12m or future 3m with PCP    Date Provider   Last Visit   10/16/2023 Wanda Cook DO   Next Visit   1/22/2024 Wanda Cook DO   ED visits in past 90 days: 0        Note composed:1:07 PM 12/08/2023

## 2023-12-11 ENCOUNTER — PATIENT OUTREACH (OUTPATIENT)
Dept: EMERGENCY MEDICINE | Facility: HOSPITAL | Age: 68
End: 2023-12-11
Payer: MEDICARE

## 2023-12-11 ENCOUNTER — TELEPHONE (OUTPATIENT)
Dept: PSYCHIATRY | Facility: CLINIC | Age: 68
End: 2023-12-11
Payer: MEDICARE

## 2023-12-11 ENCOUNTER — TELEPHONE (OUTPATIENT)
Dept: INTERNAL MEDICINE | Facility: CLINIC | Age: 68
End: 2023-12-11
Payer: MEDICARE

## 2023-12-11 NOTE — TELEPHONE ENCOUNTER
----- Message from Ana María Main, Patient Care Assistant sent at 12/11/2023  9:19 AM CST -----  Regarding: Hospital Bed.  Good afternoon, daughter, Kamille is wanting a call to discuss hospital bed. Kamille explained pt is getting sicker, and the nurse that goes in the home can help with hospital bed, as expressed. Kamille can be reached at [phone]. Thanks in advance! :)

## 2023-12-11 NOTE — PROGRESS NOTES
Daughter is having questions about hospital bed for pt and understands a message will be sent to PCP.    ED navigator sent the following to Dr. Cook' staff: Good afternoon, daughterKamille is wanting a call to discuss hospital bed. Kamille explained pt is getting sicker, and the nurse that goes in the home can help with hospital bed, as expressed. Kamille can be reached at [phone]. Thanks in advance! :) ED navigator ensured patient had no other needs at this time. ED navigator reminded patient to reach out if there is ever anything she can assist with. ED navigator will follow-up with patient on/around 12/18/2023.    Ana María Main  ED Navigator  [phone]

## 2023-12-11 NOTE — TELEPHONE ENCOUNTER
"Returned call to pt daughter concerning ordering pt a hospital bed. Stated that she was already approved for the bed but she needed papers sent to [facility]. Stating that her provider had already signed the papers. Advised pt that we would have to call [facility] to see what papers she needs. Pt daughter then said that she will handle it stating "don't worry about it, it take care of it" and that she would talk to PCP  at her appointment". Then she hung up.  "

## 2023-12-11 NOTE — PROGRESS NOTES
Pt/family was reminded about pt's labs on Wednesday. Daughter would like to reschedule for tomorrow. Pt was rescheduled, reminded about, and will be attending labs for tomorrow at 11:20 p.m.    Ana María Main  ED Navigator  [phone]

## 2023-12-11 NOTE — PROGRESS NOTES
PSYCHIATRIC EVALUATION     Name: Krysta Manuel  Age: 68 y.o.  : 1955    49 minutes of total time spent on the encounter, which includes face to face time and non-face to face time.  Face-to-face time: 32 minutes.    Preparing to see the patient (reviewing portions of the available record), Performing a medically appropriate evaluation, Counseling and educating the patient/family/caregiver, Ordering medications, labs, or referrals, and Documenting clinical information in the health record      CHIEF COMPLAINT :  The patient presents with her 2 daughters Leonel and Kamille at the patient's request and with her consent.  Kamille, with whom the patient lives, indicates that her primary concerns are excessively irritable reactions to situations and difficulty with sleep at night (clarifies Ambien 5 mg was ineffective and that the patient has been maintained Ambien 10 mg for many years, recently prescribed at 10 mg by pain management, with the family requesting that psychiatry take over managing Ambien).      HISTORY OF PRESENT ILLNESS:         Krysta Manuel a 68 y.o.  female presents today in order to continue care in the clinic, with Dr. Pierre having transferred to [facility].  The patient has seen Dr. Pierre for 3 visits, the initial visit on 2022 and 2 follow-up visits on 2023 and 2023.  Diagnoses have been recurrent major depression and anxiety, treated with Zoloft 150 mg at bedtime.    A  neurology visit indicates impressions of history of stroke, left hemiparesis, dementia, major neurocognitive disorder due to multiple etiologies, with agitation, and at risk for prolonged QT.    2023 EKG:   Vent. Rate : 073 BPM     Atrial Rate : 073 BPM      P-R Int : 188 ms          QRS Dur : 092 ms       QT Int : 446 ms       P-R-T Axes : 038 067 025 degrees      QTc Int : 491 ms   Normal sinus rhythm   Prolonged QT   Abnormal ECG   When compared with ECG of  11-MAY-2023 11:52,   Questionable change in The axis   Nonspecific T wave abnormality no longer evident in Lateral leads   Confirmed by JH AUSTIN MD ([id number]) on 9/6/2023 12:58:19 PM      Per [facility], the patient's medical history includes hemiparesis status post CVA, dementia, type 2 diabetes, hypertension, hyperlipidemia, stage 3 chronic kidney disease, hypothyroidism, vitamin D deficiency, and arthritis.  Per epic, the patient's medications are Zoloft, Lamictal 100 mg daily, Ambien, Aricept, Namenda, gabapentin, hydrocodone, aspirin, carvedilol, Plavix, diltiazem, Trulicity, Flonase, glipizide, hydralazine, HCTZ, insulin, Avapro, Synthroid, Linzess, metformin, polyethylene glycol, Actos, Crestor     indicates the patient is maintained on Ambien 10 mg, gabapentin, and hydrocodone prescribed by Dr. Edmond John.     In the current session, the patient has minimal spontaneity and responds briefly, though to all questions.  She acknowledges occasionally sad mood with decreased energy and enjoyment but denies issues of decreased appetite, insomnia, thoughts of self-harm, feelings of aggression or thoughts of harm towards others, and psychosis.  She reports that she becomes anxious in certain situations, but not excessively.  She admits that she becomes frustrated and irritable at times.      Her daughters indicate that anxiety is controlled but that there is some remaining depression with decreased energy and enjoyment.  Appetite is good.  Sleep is good with Ambien at 10 mg.  No issues of self-harm or physically aggressive behavior.  Kamille indicates that the patient becomes excessively irritable with frustrations and this and a wish for psychiatry to resume prescribing Ambien, with a 10 mg dose, are her concerns.    No side effects with Zoloft or Lamictal, including no rash.  Lamictal has helped with mood per the daughters.      PAST BEHAVIORAL HEALTH HISTORY  Per the patient and her daughters:  Inpatient  Treatment - none  Suicide Attempts - none  Violence - none  Psychosis - none  Anna/Hypomania - none  Medications - as above  Counseling - none  Substance Abuse Treatment - none    SUBSTANCE USE:    Alcohol:  None     Other:  None    Allergy Review:   Review of patient's allergies indicates:  No Known Allergies     Medical Problem List:   Patient Active Problem List   Diagnosis    CVA (cerebral vascular accident)    History of stroke    Vitamin D deficiency    Hypothyroidism (acquired)    Stroke    Insomnia    Hyperlipidemia    Hypertension    Heart disease    Uncontrolled type 2 diabetes mellitus with hyperglycemia, with long-term current use of insulin    Depression    Arthritis    Type II diabetes mellitus with peripheral circulatory disorder    Class 2 severe obesity due to excess calories with serious comorbidity and body mass index (BMI) of 38.0 to 38.9 in adult    Major depression in remission    Hemiparesis affecting left side as late effect of cerebrovascular accident    Dementia without behavioral disturbance    BMI 36.0-36.9,adult    Depression, major, recurrent, moderate    Anxiety    Colon cancer screening    Stage 3a chronic kidney disease        Past Surgical History:   Procedure Laterality Date    CHOLECYSTECTOMY      COLONOSCOPY      COLONOSCOPY N/A 8/15/2023    Procedure: COLONOSCOPY;  Surgeon: Fina Vieyra MD;  Location: [facility];  Service: Endoscopy;  Laterality: N/A;    HYSTERECTOMY      THYROIDECTOMY      TOTAL ABDOMINAL HYSTERECTOMY W/ BILATERAL SALPINGOOPHORECTOMY        Family History:  Family History   Problem Relation Age of Onset    Hypertension Mother     Diabetes Sister       Family Psychiatric History:  None known per the patient and her daughters    PSYCHO-SOCIAL/DEVELOPMENT HISTORY:     Relationships:  The patient lives with Kamille and son tells 15-year-old son.  The patient and her daughter report that the 3 get along well.  Both daughters are clearly very supportive of the  patient.  The patient feels close to her daughters but not so close to her 2 sons.  Per chart history, she has a 11 grandchildren and 3 great-grandchildren.        The patient presents in a wheelchair but indicates that she uses a cane to get around the home.  Her daughter Leonel helps her with baths, and she otherwise does her own grooming.    Education:  9th grade per chart    Legal Issues:  None per chart    Employment:  Per chart, disability with past work as a     Mental Status Exam:   Appearance:  Appropriately groomed   Orientation:  Person, situation, month, and year  Attitude:  Cooperative  Eye Contact:  Fair  Behavior:  Calm throughout the session  Speech:    Rate - WNL    Volume - WNL    Quantity - decreased    Tone - blunted    Pressure - no  Thought Processes:  Answers are brief but linear  Mood:  Some depression and irritability   Affect:  Blunted   SI:  No, and no thoughts of self-harm  HI:  No, and no thoughts of harm towards others or feelings of aggression  Paranoia:  No  Delusions:  No  Hallucinations:  No  Attention:  Decreased  Cognition:  Cognitive impairment by history  Insight:  Fair  Judgment:  Fair  Impulse Control:  Fair    Assessment/Plan:     Encounter Diagnoses   Name Primary?    Major neurocognitive disorder due to multiple etiologies, with agitation Yes    History of stroke     Depression, major, recurrent, moderate     Insomnia, unspecified type         Follow up in 3 months  At the end of the appointment, the patient was directed to the  for scheduling.    Psychiatry Medication:  Continue Ambien 10 mg nightly.  Wean and discontinue Zoloft in light of limited benefit and efforts to minimize QTC issues.  Increase Lamictal to 150 mg daily.  The daughter's very clearly understand that of the patient misses anymore than 3 days of the medication, they must contact the clinic for the medication to be retitrate it due to the risk of Kay-Jono soon.  The risks of  Kay-Jono syndrome current increased was reviewed patient and her daughters.    Reviewed:  The risk of developing a rare but very serious rash (Kay-Jono) while taking Lamictal was discussed. Patient was advised to take Lamictal exactly as prescribed, not to increase Lamictal unless directed and not to stop and start this medication. It was explained that patient needs to stay current with refills and can not miss taking Lamictal more than 4 days or it will have to be restarted at a reduced dose under prescriber supervision. If a rash develops, discontinue Lamictal immediately and contact the clinic. Go to the ER for treatment if needed.  Rationale, risks, and side effects reviewed, including decreased alertness, unsteadiness, decreased focus, confusion, effects on activities such as work and driving because of these potential side effects, suicidal ideations, robyn, cardiovascular issues, decreased blood counts, hepatic effects, and others.      Reviewed with patient:  Report side effects or any other problems to the psychiatrist during clinic business hours. Call 911 or go to an emergency department for any acute or urgent issues otherwise.  Follow up with primary care/MD specialist for continued monitoring of general health and wellness and any medical conditions.  Call  [facility] at [phone] or go to [facility]  My Chart if necessary for scheduling or rescheduling.  It is the responsibility of the patient to reschedule an appointment if an appointment has been canceled or missed.  Understanding was expressed; and no further concerns or questions were raised at this time.       There are no Patient Instructions on file for this visit.    Large portions of this note were completed by way of voice recognition dictation software, and transcription errors are possible, such that specific information in the note should be considered in the context of the entire report.

## 2023-12-12 ENCOUNTER — LAB VISIT (OUTPATIENT)
Dept: LAB | Facility: HOSPITAL | Age: 68
End: 2023-12-12
Payer: MEDICARE

## 2023-12-12 ENCOUNTER — OFFICE VISIT (OUTPATIENT)
Dept: PSYCHIATRY | Facility: CLINIC | Age: 68
End: 2023-12-12
Payer: COMMERCIAL

## 2023-12-12 VITALS — DIASTOLIC BLOOD PRESSURE: 71 MMHG | HEART RATE: 83 BPM | SYSTOLIC BLOOD PRESSURE: 111 MMHG

## 2023-12-12 DIAGNOSIS — G47.00 INSOMNIA, UNSPECIFIED TYPE: ICD-10-CM

## 2023-12-12 DIAGNOSIS — Z86.73 HISTORY OF STROKE: ICD-10-CM

## 2023-12-12 DIAGNOSIS — E11.51 TYPE II DIABETES MELLITUS WITH PERIPHERAL CIRCULATORY DISORDER: ICD-10-CM

## 2023-12-12 DIAGNOSIS — F02.811 MAJOR NEUROCOGNITIVE DISORDER DUE TO MULTIPLE ETIOLOGIES, WITH AGITATION: Primary | ICD-10-CM

## 2023-12-12 DIAGNOSIS — F33.1 DEPRESSION, MAJOR, RECURRENT, MODERATE: ICD-10-CM

## 2023-12-12 LAB
ESTIMATED AVG GLUCOSE: 171 MG/DL (ref 68–131)
HBA1C MFR BLD: 7.6 % (ref 4–5.6)

## 2023-12-12 PROCEDURE — 3051F HG A1C>EQUAL 7.0%<8.0%: CPT | Mod: CPTII,S$GLB,, | Performed by: PSYCHIATRY & NEUROLOGY

## 2023-12-12 PROCEDURE — 3066F NEPHROPATHY DOC TX: CPT | Mod: CPTII,S$GLB,, | Performed by: PSYCHIATRY & NEUROLOGY

## 2023-12-12 PROCEDURE — 36415 COLL VENOUS BLD VENIPUNCTURE: CPT | Performed by: NURSE PRACTITIONER

## 2023-12-12 PROCEDURE — 99215 OFFICE O/P EST HI 40 MIN: CPT | Mod: S$GLB,,, | Performed by: PSYCHIATRY & NEUROLOGY

## 2023-12-12 PROCEDURE — 3051F PR MOST RECENT HEMOGLOBIN A1C LEVEL 7.0 - < 8.0%: ICD-10-PCS | Mod: CPTII,S$GLB,, | Performed by: PSYCHIATRY & NEUROLOGY

## 2023-12-12 PROCEDURE — 99215 PR OFFICE/OUTPT VISIT, EST, LEVL V, 40-54 MIN: ICD-10-PCS | Mod: S$GLB,,, | Performed by: PSYCHIATRY & NEUROLOGY

## 2023-12-12 PROCEDURE — 1160F PR REVIEW ALL MEDS BY PRESCRIBER/CLIN PHARMACIST DOCUMENTED: ICD-10-PCS | Mod: CPTII,S$GLB,, | Performed by: PSYCHIATRY & NEUROLOGY

## 2023-12-12 PROCEDURE — 3078F PR MOST RECENT DIASTOLIC BLOOD PRESSURE < 80 MM HG: ICD-10-PCS | Mod: CPTII,S$GLB,, | Performed by: PSYCHIATRY & NEUROLOGY

## 2023-12-12 PROCEDURE — 1160F RVW MEDS BY RX/DR IN RCRD: CPT | Mod: CPTII,S$GLB,, | Performed by: PSYCHIATRY & NEUROLOGY

## 2023-12-12 PROCEDURE — 3078F DIAST BP <80 MM HG: CPT | Mod: CPTII,S$GLB,, | Performed by: PSYCHIATRY & NEUROLOGY

## 2023-12-12 PROCEDURE — 3074F PR MOST RECENT SYSTOLIC BLOOD PRESSURE < 130 MM HG: ICD-10-PCS | Mod: CPTII,S$GLB,, | Performed by: PSYCHIATRY & NEUROLOGY

## 2023-12-12 PROCEDURE — 3074F SYST BP LT 130 MM HG: CPT | Mod: CPTII,S$GLB,, | Performed by: PSYCHIATRY & NEUROLOGY

## 2023-12-12 PROCEDURE — 3061F NEG MICROALBUMINURIA REV: CPT | Mod: CPTII,S$GLB,, | Performed by: PSYCHIATRY & NEUROLOGY

## 2023-12-12 PROCEDURE — 3061F PR NEG MICROALBUMINURIA RESULT DOCUMENTED/REVIEW: ICD-10-PCS | Mod: CPTII,S$GLB,, | Performed by: PSYCHIATRY & NEUROLOGY

## 2023-12-12 PROCEDURE — 3066F PR DOCUMENTATION OF TREATMENT FOR NEPHROPATHY: ICD-10-PCS | Mod: CPTII,S$GLB,, | Performed by: PSYCHIATRY & NEUROLOGY

## 2023-12-12 PROCEDURE — 1159F PR MEDICATION LIST DOCUMENTED IN MEDICAL RECORD: ICD-10-PCS | Mod: CPTII,S$GLB,, | Performed by: PSYCHIATRY & NEUROLOGY

## 2023-12-12 PROCEDURE — 4010F ACE/ARB THERAPY RXD/TAKEN: CPT | Mod: CPTII,S$GLB,, | Performed by: PSYCHIATRY & NEUROLOGY

## 2023-12-12 PROCEDURE — 1159F MED LIST DOCD IN RCRD: CPT | Mod: CPTII,S$GLB,, | Performed by: PSYCHIATRY & NEUROLOGY

## 2023-12-12 PROCEDURE — 99999 PR PBB SHADOW E&M-EST. PATIENT-LVL III: CPT | Mod: PBBFAC,,, | Performed by: PSYCHIATRY & NEUROLOGY

## 2023-12-12 PROCEDURE — 4010F PR ACE/ARB THEARPY RXD/TAKEN: ICD-10-PCS | Mod: CPTII,S$GLB,, | Performed by: PSYCHIATRY & NEUROLOGY

## 2023-12-12 PROCEDURE — 99999 PR PBB SHADOW E&M-EST. PATIENT-LVL III: ICD-10-PCS | Mod: PBBFAC,,, | Performed by: PSYCHIATRY & NEUROLOGY

## 2023-12-12 PROCEDURE — 83036 HEMOGLOBIN GLYCOSYLATED A1C: CPT | Performed by: NURSE PRACTITIONER

## 2023-12-12 RX ORDER — HYDRALAZINE HYDROCHLORIDE 25 MG/1
25 TABLET, FILM COATED ORAL EVERY 12 HOURS
Qty: 60 TABLET | Refills: 1 | Status: SHIPPED | OUTPATIENT
Start: 2023-12-12

## 2023-12-12 RX ORDER — ZOLPIDEM TARTRATE 10 MG/1
10 TABLET ORAL NIGHTLY
Qty: 30 TABLET | Refills: 2 | Status: SHIPPED | OUTPATIENT
Start: 2023-12-12

## 2023-12-12 RX ORDER — LAMOTRIGINE 150 MG/1
150 TABLET ORAL DAILY
Qty: 30 TABLET | Refills: 5 | Status: SHIPPED | OUTPATIENT
Start: 2023-12-12

## 2023-12-19 ENCOUNTER — PATIENT OUTREACH (OUTPATIENT)
Dept: EMERGENCY MEDICINE | Facility: HOSPITAL | Age: 68
End: 2023-12-19
Payer: MEDICARE

## 2023-12-19 ENCOUNTER — TELEPHONE (OUTPATIENT)
Dept: INTERNAL MEDICINE | Facility: CLINIC | Age: 68
End: 2023-12-19
Payer: MEDICARE

## 2023-12-19 NOTE — PROGRESS NOTES
Pt expressed she still has not been able to get the bed. It is understood as message will be sent to PCP.    ED navigator sent the following to Dr. Cook' staff: Good afternoon! Pt's daughter, Kamille, expressed she was never able to get her mother's hospital bed still. Kamille explained she received a paper order and it was approved, but is needing it sent in the mail again because it was misplaced. Therefore, she is asking it to be mailed it her. Kamille expressed someone from  will assist her and her mother with receiving the bed once she has the paper order. Kamille can be reached at [phone]. Thanks in advance :) ED navigator ensured patient had no other needs at this time. ED navigator reminded patient to reach out if there is ever anything she can assist with. ED navigator will follow-up with patient on/around 1/2/2024.    Ana María aMin  ED Navigator  [phone]

## 2023-12-19 NOTE — TELEPHONE ENCOUNTER
----- Message from Ana María Main, Patient Care Assistant sent at 12/19/2023  4:04 PM CST -----  Regarding: Hospital bed order.  Good afternoon! Pt's daughter, Kamille, expressed she was never able to get her mother's hospital bed still. Kamille explained she received a paper order and it was approved, but is needing it sent in the mail again because it was misplaced. Therefore, she is asking it to be mailed it her. Kamille expressed someone from  will assist her and her mother with receiving the bed once she has the paper order. Kamille can be reached at [phone]. Thanks in advance :)

## 2023-12-19 NOTE — TELEPHONE ENCOUNTER
----- Message from Ana María Main, Patient Care Assistant sent at 12/19/2023  4:04 PM CST -----  Regarding: Hospital bed order.  Good afternoon! Pt's daughter, Kamille, expressed she was never able to get her mother's hospital bed still. Kamille explained she received a paper order and it was approved, but is needing it sent in the mail again because it was misplaced. Therefore, she is asking it to be mailed it her. Kamille expressed someone from  will assist her and her mother with receiving the bed once she has the paper order. Kamille can be reached at [phone]. Thanks in advance :)

## 2023-12-20 ENCOUNTER — OFFICE VISIT (OUTPATIENT)
Dept: NEUROLOGY | Facility: CLINIC | Age: 68
End: 2023-12-20
Payer: COMMERCIAL

## 2023-12-20 DIAGNOSIS — F02.80 MAJOR NEUROCOGNITIVE DISORDER DUE TO MULTIPLE ETIOLOGIES: Primary | ICD-10-CM

## 2023-12-20 PROCEDURE — 96132 PR NEUROPSYCHOLOGIC TEST EVAL SVCS, 1ST HR: ICD-10-PCS | Mod: S$GLB,,, | Performed by: STUDENT IN AN ORGANIZED HEALTH CARE EDUCATION/TRAINING PROGRAM

## 2023-12-20 PROCEDURE — 96132 NRPSYC TST EVAL PHYS/QHP 1ST: CPT | Mod: S$GLB,,, | Performed by: STUDENT IN AN ORGANIZED HEALTH CARE EDUCATION/TRAINING PROGRAM

## 2023-12-20 PROCEDURE — 99499 UNLISTED E&M SERVICE: CPT | Mod: S$GLB,,, | Performed by: STUDENT IN AN ORGANIZED HEALTH CARE EDUCATION/TRAINING PROGRAM

## 2023-12-20 PROCEDURE — 99999 PR PBB SHADOW E&M-EST. PATIENT-LVL I: ICD-10-PCS | Mod: PBBFAC,,, | Performed by: STUDENT IN AN ORGANIZED HEALTH CARE EDUCATION/TRAINING PROGRAM

## 2023-12-20 PROCEDURE — 99499 NO LOS: ICD-10-PCS | Mod: S$GLB,,, | Performed by: STUDENT IN AN ORGANIZED HEALTH CARE EDUCATION/TRAINING PROGRAM

## 2023-12-20 PROCEDURE — 99999 PR PBB SHADOW E&M-EST. PATIENT-LVL I: CPT | Mod: PBBFAC,,, | Performed by: STUDENT IN AN ORGANIZED HEALTH CARE EDUCATION/TRAINING PROGRAM

## 2023-12-20 NOTE — PROGRESS NOTES
Ms. Manuel and her daughter attended a in-person feedback session to discuss the results from her recent neuropsychological testing (see report dated 11/16/2023). We discussed her test results, diagnoses, and my recommendations. Ms. Manuel and her daughter voiced their understanding of the information provided, and voiced their intention to follow through on the recommendations provided. All questions were answered to their satisfaction and Ms. Manuel was provided with a copy of her report. she was encouraged to contact our office with any future questions or concerns.         _____________________  Levi Redding, Ph.D.  Neuropsychologist  Department of Neuropsychology  [facility] [place]        [facility] Documentation     Time on review of neuropsychological test data and relevant records, data interpretation, providing feedback about these results, and writing/ documentation: 60 minutes; [id number]

## 2023-12-21 PROCEDURE — G0179 MD RECERTIFICATION HHA PT: HCPCS | Mod: ,,, | Performed by: INTERNAL MEDICINE

## 2023-12-21 NOTE — TELEPHONE ENCOUNTER
Called pt daughter to let her know that the order has been signed and that she can pick it up from the . Verbalized understanding.

## 2023-12-28 ENCOUNTER — OFFICE VISIT (OUTPATIENT)
Dept: INTERNAL MEDICINE | Facility: CLINIC | Age: 68
End: 2023-12-28
Payer: MEDICARE

## 2023-12-28 VITALS
TEMPERATURE: 96 F | RESPIRATION RATE: 21 BRPM | OXYGEN SATURATION: 99 % | SYSTOLIC BLOOD PRESSURE: 124 MMHG | HEART RATE: 78 BPM | DIASTOLIC BLOOD PRESSURE: 78 MMHG | WEIGHT: 215 LBS | BODY MASS INDEX: 38.09 KG/M2

## 2023-12-28 DIAGNOSIS — I69.354 HEMIPARESIS AFFECTING LEFT SIDE AS LATE EFFECT OF CEREBROVASCULAR ACCIDENT: ICD-10-CM

## 2023-12-28 DIAGNOSIS — N18.31 STAGE 3A CHRONIC KIDNEY DISEASE: ICD-10-CM
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
## 2023-12-28 DIAGNOSIS — I10 HYPERTENSION, UNSPECIFIED TYPE: ICD-10-CM

## 2023-12-28 DIAGNOSIS — F03.90 DEMENTIA WITHOUT BEHAVIORAL DISTURBANCE: ICD-10-CM

## 2023-12-28 PROCEDURE — 1126F AMNT PAIN NOTED NONE PRSNT: CPT | Mod: CPTII,S$GLB,, | Performed by: PHYSICIAN ASSISTANT

## 2023-12-28 PROCEDURE — 1101F PT FALLS ASSESS-DOCD LE1/YR: CPT | Mod: CPTII,S$GLB,, | Performed by: PHYSICIAN ASSISTANT

## 2023-12-28 PROCEDURE — 3078F PR MOST RECENT DIASTOLIC BLOOD PRESSURE < 80 MM HG: ICD-10-PCS | Mod: CPTII,S$GLB,, | Performed by: PHYSICIAN ASSISTANT

## 2023-12-28 PROCEDURE — 99999 PR PBB SHADOW E&M-EST. PATIENT-LVL III: ICD-10-PCS | Mod: PBBFAC,,, | Performed by: PHYSICIAN ASSISTANT

## 2023-12-28 PROCEDURE — 3074F PR MOST RECENT SYSTOLIC BLOOD PRESSURE < 130 MM HG: ICD-10-PCS | Mod: CPTII,S$GLB,, | Performed by: PHYSICIAN ASSISTANT

## 2023-12-28 PROCEDURE — 3288F PR FALLS RISK ASSESSMENT DOCUMENTED: ICD-10-PCS | Mod: CPTII,S$GLB,, | Performed by: PHYSICIAN ASSISTANT

## 2023-12-28 PROCEDURE — 1159F PR MEDICATION LIST DOCUMENTED IN MEDICAL RECORD: ICD-10-PCS | Mod: CPTII,S$GLB,, | Performed by: PHYSICIAN ASSISTANT

## 2023-12-28 PROCEDURE — 1126F PR PAIN SEVERITY QUANTIFIED, NO PAIN PRESENT: ICD-10-PCS | Mod: CPTII,S$GLB,, | Performed by: PHYSICIAN ASSISTANT

## 2023-12-28 PROCEDURE — 3008F BODY MASS INDEX DOCD: CPT | Mod: CPTII,S$GLB,, | Performed by: PHYSICIAN ASSISTANT

## 2023-12-28 PROCEDURE — 3288F FALL RISK ASSESSMENT DOCD: CPT | Mod: CPTII,S$GLB,, | Performed by: PHYSICIAN ASSISTANT

## 2023-12-28 PROCEDURE — 3051F PR MOST RECENT HEMOGLOBIN A1C LEVEL 7.0 - < 8.0%: ICD-10-PCS | Mod: CPTII,S$GLB,, | Performed by: PHYSICIAN ASSISTANT

## 2023-12-28 PROCEDURE — 4010F ACE/ARB THERAPY RXD/TAKEN: CPT | Mod: CPTII,S$GLB,, | Performed by: PHYSICIAN ASSISTANT

## 2023-12-28 PROCEDURE — 99213 OFFICE O/P EST LOW 20 MIN: CPT | Mod: S$GLB,,, | Performed by: PHYSICIAN ASSISTANT

## 2023-12-28 PROCEDURE — 3051F HG A1C>EQUAL 7.0%<8.0%: CPT | Mod: CPTII,S$GLB,, | Performed by: PHYSICIAN ASSISTANT

## 2023-12-28 PROCEDURE — 3066F PR DOCUMENTATION OF TREATMENT FOR NEPHROPATHY: ICD-10-PCS | Mod: CPTII,S$GLB,, | Performed by: PHYSICIAN ASSISTANT

## 2023-12-28 PROCEDURE — 3008F PR BODY MASS INDEX (BMI) DOCUMENTED: ICD-10-PCS | Mod: CPTII,S$GLB,, | Performed by: PHYSICIAN ASSISTANT

## 2023-12-28 PROCEDURE — 1160F PR REVIEW ALL MEDS BY PRESCRIBER/CLIN PHARMACIST DOCUMENTED: ICD-10-PCS | Mod: CPTII,S$GLB,, | Performed by: PHYSICIAN ASSISTANT

## 2023-12-28 PROCEDURE — 3061F NEG MICROALBUMINURIA REV: CPT | Mod: CPTII,S$GLB,, | Performed by: PHYSICIAN ASSISTANT

## 2023-12-28 PROCEDURE — 99213 PR OFFICE/OUTPT VISIT, EST, LEVL III, 20-29 MIN: ICD-10-PCS | Mod: S$GLB,,, | Performed by: PHYSICIAN ASSISTANT

## 2023-12-28 PROCEDURE — 1159F MED LIST DOCD IN RCRD: CPT | Mod: CPTII,S$GLB,, | Performed by: PHYSICIAN ASSISTANT

## 2023-12-28 PROCEDURE — 1101F PR PT FALLS ASSESS DOC 0-1 FALLS W/OUT INJ PAST YR: ICD-10-PCS | Mod: CPTII,S$GLB,, | Performed by: PHYSICIAN ASSISTANT

## 2023-12-28 PROCEDURE — 3078F DIAST BP <80 MM HG: CPT | Mod: CPTII,S$GLB,, | Performed by: PHYSICIAN ASSISTANT

## 2023-12-28 PROCEDURE — 4010F PR ACE/ARB THEARPY RXD/TAKEN: ICD-10-PCS | Mod: CPTII,S$GLB,, | Performed by: PHYSICIAN ASSISTANT

## 2023-12-28 PROCEDURE — 1160F RVW MEDS BY RX/DR IN RCRD: CPT | Mod: CPTII,S$GLB,, | Performed by: PHYSICIAN ASSISTANT

## 2023-12-28 PROCEDURE — 3074F SYST BP LT 130 MM HG: CPT | Mod: CPTII,S$GLB,, | Performed by: PHYSICIAN ASSISTANT

## 2023-12-28 PROCEDURE — 99999 PR PBB SHADOW E&M-EST. PATIENT-LVL III: CPT | Mod: PBBFAC,,, | Performed by: PHYSICIAN ASSISTANT

## 2023-12-28 PROCEDURE — 3066F NEPHROPATHY DOC TX: CPT | Mod: CPTII,S$GLB,, | Performed by: PHYSICIAN ASSISTANT

## 2023-12-28 PROCEDURE — 3061F PR NEG MICROALBUMINURIA RESULT DOCUMENTED/REVIEW: ICD-10-PCS | Mod: CPTII,S$GLB,, | Performed by: PHYSICIAN ASSISTANT

## 2023-12-28 RX ORDER — OSELTAMIVIR PHOSPHATE 75 MG/1
75 CAPSULE ORAL 2 TIMES DAILY
COMMUNITY

## 2023-12-28 NOTE — PROGRESS NOTES
Subjective:      Patient ID: Krysta Manuel is a 68 y.o. female.    Chief Complaint: Follow-up (Follow up from emergency room visit due to fall. )    Patient is known to me, being seen today for ER follow up.   Fall 5 days ago, fell off bed, denies hitting head   Evaluated at [facility] ER  Work up completed, CT head, negative for acute concerns   Denies new or persistent pain since fall     Evaluated in Urgent Care the day prior for URI, improving     Present with daughter today, who requests hospital bed for home use  Orders previously completed by PCP but HH has not received   States HH will also be faxing over PT orders     Last visit Sept 2023 with myself.      Review of Systems   Constitutional:  Negative for chills, diaphoresis and fever.   HENT:  Negative for congestion, rhinorrhea and sore throat.    Respiratory:  Negative for cough, shortness of breath and wheezing.    Gastrointestinal:  Negative for abdominal pain, constipation, diarrhea, nausea and vomiting.   Skin:  Negative for rash.   Neurological:  Negative for dizziness, light-headedness and headaches.       Objective:   /78 (BP Location: Right arm, Patient Position: Sitting, BP Method: Large (Manual))   Pulse 78   Temp 96 °F (35.6 °C) (Tympanic)   Resp (!) 21   Wt 97.5 kg (215 lb)   SpO2 99%   BMI 38.09 kg/m²   Physical Exam  Constitutional:       General: She is not in acute distress.     Appearance: She is well-developed. She is not ill-appearing or diaphoretic.   HENT:      Head: Normocephalic and atraumatic.      Right Ear: External ear normal.      Left Ear: External ear normal.   Eyes:      General: Lids are normal.         Right eye: No discharge.         Left eye: No discharge.      Conjunctiva/sclera: Conjunctivae normal.      Right eye: Right conjunctiva is not injected.      Left eye: Left conjunctiva is not injected.   Pulmonary:      Effort: Pulmonary effort is normal. No respiratory distress.   Skin:     General: Skin is warm  and dry.      Findings: No rash.   Neurological:      Mental Status: She is alert and oriented to person, place, and time.   Psychiatric:         Speech: Speech normal.         Behavior: Behavior normal.         Thought Content: Thought content normal.         Judgment: Judgment normal.       Assessment:      1. Dementia without behavioral disturbance    2. Hypertension, unspecified type    3. Stage 3a chronic kidney disease       Plan:   Dementia without behavioral disturbance    Hypertension, unspecified type    Stage 3a chronic kidney disease      Will fax bed orders to HH     Keep f/u with PCP in March     Discussed worsening signs/symptoms and when to return to clinic or go to ED.   Patient expresses understanding and agrees with treatment plan.

## 2024-01-02 ENCOUNTER — PATIENT OUTREACH (OUTPATIENT)
Dept: EMERGENCY MEDICINE | Facility: HOSPITAL | Age: 69
End: 2024-01-02
Payer: MEDICARE

## 2024-01-02 NOTE — PROGRESS NOTES
Pt is doing well. Daughter expressed PCP clinic faxed over paperwork she needed for the bed, but is unsure if it was received. No needs during this time.    ED navigator inquired about bed. ED navigator ensured patient had no other needs at this time. ED navigator reminded patient to reach out if there is ever anything she can assist with. ED navigator will follow-up with patient on/around 2/19/2024.    Ana María Main  ED Navigator- [place]  [phone]

## 2024-01-06 DIAGNOSIS — E11.65 UNCONTROLLED TYPE 2 DIABETES MELLITUS WITH HYPERGLYCEMIA, WITH LONG-TERM CURRENT USE OF INSULIN: ICD-10-CM

## 2024-01-06 DIAGNOSIS — Z79.4 UNCONTROLLED TYPE 2 DIABETES MELLITUS WITH HYPERGLYCEMIA, WITH LONG-TERM CURRENT USE OF INSULIN: ICD-10-CM

## 2024-01-06 NOTE — TELEPHONE ENCOUNTER
No care due was identified.  Health [facility] Embedded Care Due Messages. Reference number: [id number].   1/06/2024 11:02:32 AM CST

## 2024-01-08 RX ORDER — METFORMIN HYDROCHLORIDE 500 MG/1
TABLET, EXTENDED RELEASE ORAL
Qty: 360 TABLET | Refills: 1 | Status: SHIPPED | OUTPATIENT
Start: 2024-01-08

## 2024-01-08 NOTE — TELEPHONE ENCOUNTER
Refill Decision Note   Krysta Manuel  is requesting a refill authorization.  Brief Assessment and Rationale for Refill:  Approve     Medication Therapy Plan:         Comments:     Note composed:10:37 AM 01/08/2024

## 2024-01-11 ENCOUNTER — TELEPHONE (OUTPATIENT)
Dept: INTERNAL MEDICINE | Facility: CLINIC | Age: 69
End: 2024-01-11
Payer: MEDICARE

## 2024-01-11 DIAGNOSIS — F02.811 MAJOR NEUROCOGNITIVE DISORDER DUE TO MULTIPLE ETIOLOGIES, WITH AGITATION: Primary | ICD-10-CM

## 2024-01-11 DIAGNOSIS — I69.354 HEMIPARESIS AFFECTING LEFT SIDE AS LATE EFFECT OF CEREBROVASCULAR ACCIDENT: ICD-10-CM

## 2024-01-11 NOTE — TELEPHONE ENCOUNTER
Okay to have hospice evaluation through home health. If they do not do it, let me know and I will have to put in a separate order but the pt/family would have to let us know where to send it.

## 2024-01-11 NOTE — TELEPHONE ENCOUNTER
----- Message from Jose Hawley sent at 1/11/2024 10:49 AM CST -----  Contact: Dilshad- [facility]  Dilshad would like a call back at [phone] , in regards to the pt and care giver wanting  an order for her to be admitted to hospice. (Fax# for Hospice- [phone])

## 2024-01-12 ENCOUNTER — TELEPHONE (OUTPATIENT)
Dept: INTERNAL MEDICINE | Facility: CLINIC | Age: 69
End: 2024-01-12
Payer: MEDICARE

## 2024-01-12 NOTE — TELEPHONE ENCOUNTER
----- Message from Rama Redding sent at 1/12/2024  1:01 PM CST -----  Contact: Daughter   Daughter calling concerning paper to start or admit  patient to Hospice. Please callback [phone]

## 2024-01-12 NOTE — TELEPHONE ENCOUNTER
Returned pt's daughter call stating that she was told by the home health nurse stated that her provider needed to make an order stating that the pt can be admitted to hospice.     Called  HH to to clarify message, but there was no answer. Left voice message to call us back.

## 2024-01-16 ENCOUNTER — OFFICE VISIT (OUTPATIENT)
Dept: DIABETES | Facility: CLINIC | Age: 69
End: 2024-01-16
Payer: MEDICARE

## 2024-01-16 DIAGNOSIS — E78.5 HYPERLIPIDEMIA, UNSPECIFIED HYPERLIPIDEMIA TYPE: ICD-10-CM

## 2024-01-16 DIAGNOSIS — I10 HYPERTENSION, UNSPECIFIED TYPE: ICD-10-CM

## 2024-01-16 DIAGNOSIS — E11.65 UNCONTROLLED TYPE 2 DIABETES MELLITUS WITH HYPERGLYCEMIA, WITH LONG-TERM CURRENT USE OF INSULIN: Primary | ICD-10-CM

## 2024-01-16 DIAGNOSIS — Z79.4 UNCONTROLLED TYPE 2 DIABETES MELLITUS WITH HYPERGLYCEMIA, WITH LONG-TERM CURRENT USE OF INSULIN: Primary | ICD-10-CM

## 2024-01-16 PROCEDURE — 99214 OFFICE O/P EST MOD 30 MIN: CPT | Mod: 95,,, | Performed by: NURSE PRACTITIONER

## 2024-01-16 NOTE — PROGRESS NOTES
Established Patient - Virtual Telehealth Visit     The patient location is: Home ([place])  The chief complaint leading to consultation is: Diabetes Follow-up  Visit type: Virtual visit with synchronous audio and video via [employer] jessie  Total time spent with patient: 15 minutes     Each patient to whom I provide medical services by telemedicine is:  (1) informed of the relationship between the physician and patient and the respective role of any other health care provider with respect to management of the patient; and (2) notified that they may decline to receive medical services by telemedicine and may withdraw from such care at any time. Patient verbally consented to receive this service via voice-only telephone call.    PCP: Wanda Cook DO    Subjective:     Chief Complaint: Diabetes follow up.  Last visit with me: 11/13/23    HPI: 68 y.o.  female for diabetes follow up.   Type II diabetes for > 10 years.  Comorbidities: HTN, HLD, CAD, S/p CVA (2010), Left Hemiparesis, Hypothyroidism and Overweight by BMI   Personal history of pancreatitis: denies  Family history of pancreatic cancer in first-degree relative: denies  Family history of MTC/MEN/endocrine tumors: denies     Family History of Type 2 DM: father  Known diabetes complications:  DKA/HHS: no  Retinopathy: no  Peripheral neuropathy: no  Nephropathy: no    Interval history:  Daughter with patient during visit.  Since last visit, daughter admits she had recent ER visit due to fall 12/23.  On the process of being in Hospice care.  Daughter reports she continues to refuse Lantus if premeal BG less than 180.    Denies recent hospital admission or ER visit.  Denies having hypoglycemia.    Endorses diabetes related symptoms: None.    Blood glucose monitoring via fingerstick: 1x/day   Per daughter's recall, over the last 4 weeks   Fasting BG range      Activity level: Sedentary  Meal Planning:3 meals/day;1 snacks/day.    Social  "history:    - Single, Lives with daughter, grandchild    Medication compliance all of the time, diet compliance most of the time.   To be enrolled in diabetes education classes.     Previous regimen: Humalog 75/25    Past failed treatment: N/A    Current DM Medications:  Diabetes Medications               dulaglutide (TRULICITY) 4.5 mg/0.5 mL pen injector INJECT 4.5 MG INTO THE SKIN EVERY 7 DAYS.    glipiZIDE (GLUCOTROL) 10 MG tablet TAKE ONE TABLET BY MOUTH TWO TIMES A DAY BEFORE MEALS    LANTUS SOLOSTAR U-100 INSULIN glargine 100 units/mL SubQ pen Inject 72 Units into the skin once daily.  Inconsistent - pt refusing if BG at 180 or less at night    metFORMIN (GLUCOPHAGE-XR) 500 MG ER 24hr tablet TAKE 2 TABLETS BY MOUTH 2 TIMES DAILY WITH MEALS.    pioglitazone (ACTOS) 30 MG tablet Take 1 tablet (30 mg total) by mouth once daily.     Allergies  Review of patient's allergies indicates:  No Known Allergies    Labs Reviewed:  Her most recent A1C is:  Lab Results   Component Value Date    HGBA1C 7.6 (H) 12/12/2023    HGBA1C 7.3 (H) 09/06/2023    HGBA1C 7.0 (H) 05/15/2023       Her most recent BMP is:  Lab Results   Component Value Date     09/14/2023    K 4.6 09/14/2023     09/14/2023    CO2 24 09/14/2023    BUN 14 09/14/2023    CREATININE 1.1 09/14/2023    CALCIUM 10.9 (H) 09/14/2023    ANIONGAP 11 09/14/2023    ESTGFRAFRICA >60.0 07/18/2022    EGFRNONAA 58.0 (A) 07/18/2022       No results found for: "CPEPTIDE"  No results found for: "GLUTAMICACID"    There is no height or weight on file to calculate BMI.    Wt Readings from Last 3 Encounters:   12/28/23 1423 97.5 kg (215 lb)   11/13/23 1140 94.3 kg (208 lb)   10/16/23 0903 94.7 kg (208 lb 12.4 oz)        Her blood sugar in clinic today is: Not assessed due to type of visit.    Diabetes Management Status  Statin: Taking  ACE/ARB: Taking    Screening or Prevention Patient's value Goal Complete/Controlled?   HgA1C Testing and Control   Lab Results "   Component Value Date    HGBA1C 7.6 (H) 12/12/2023      Annually/Less than 8% Yes   Lipid profile : 09/06/2023 Annually Yes   LDL control Lab Results   Component Value Date    LDLCALC 51.8 (L) 09/06/2023    Annually/Less than 100 mg/dl  Yes   Nephropathy screening Lab Results   Component Value Date    MICALBCREAT Unable to calculate 02/13/2023     Lab Results   Component Value Date    PROTEINUA 1+ (A) 09/06/2023    Annually Yes   Blood pressure BP Readings from Last 1 Encounters:   12/28/23 124/78    Less than 140/90 Yes   Dilated retinal exam : 03/27/2023 Annually Yes    Foot exam   : 11/13/2023 Annually Yes     Social History     Socioeconomic History    Marital status:    Tobacco Use    Smoking status: Never    Smokeless tobacco: Never   Substance and Sexual Activity    Alcohol use: Never    Drug use: Never    Sexual activity: Not Currently     Partners: Male     Comment: That was her , she passed away. She dont want tell me     Social Determinants of Health     Financial Resource Strain: Medium Risk (9/7/2023)    Overall Financial Resource Strain (CARDIA)     Difficulty of Paying Living Expenses: Somewhat hard   Food Insecurity: Food Insecurity Present (9/7/2023)    Hunger Vital Sign     Worried About Running Out of Food in the Last Year: Sometimes true     Ran Out of Food in the Last Year: Sometimes true   Transportation Needs: No Transportation Needs (9/7/2023)    PRAPARE - Transportation     Lack of Transportation (Medical): No     Lack of Transportation (Non-Medical): No   Physical Activity: Inactive (9/7/2023)    Exercise Vital Sign     Days of Exercise per Week: 0 days     Minutes of Exercise per Session: 0 min   Stress: Stress Concern Present (9/7/2023)    [employer] - Occupational Stress Questionnaire     Feeling of Stress : Very much   Social Connections: Socially Isolated (9/7/2023)    Social Connection and Isolation Panel [NHANES]     Frequency of  Communication with Friends and Family: Never     Frequency of Social Gatherings with Friends and Family: Never     Attends Tenriism Services: Never     Active Member of Clubs or Organizations: No     Attends Club or Organization Meetings: Never     Marital Status:    Housing Stability: Low Risk  (9/7/2023)    Housing Stability Vital Sign     Unable to Pay for Housing in the Last Year: No     Number of Places Lived in the Last Year: 2     Unstable Housing in the Last Year: No     Past Medical History:   Diagnosis Date    Arthritis     Carotid artery disease     Dr. Jessa Szymanski--cardiology    Depression     DM II (diabetes mellitus, type II), controlled 12 years    Heart disease     Dr. Jessa Szymanski--cardiology    HTN (hypertension)     Hyperlipidemia     Hypothyroidism     Insomnia     Stroke 2010    Dr. Jessa Szymanski--cardiology    Vitamin D deficiency      Review of Systems   Constitutional: Negative for activity change, appetite change, fatigue and unexpected weight change.   HENT: Negative for dental problem and trouble swallowing.    Eyes: Negative for visual disturbance.   Respiratory: Negative for chest tightness and shortness of breath.    Cardiovascular: Negative for chest pain, palpitations and leg swelling.   Gastrointestinal: Negative for abdominal pain, constipation, diarrhea, nausea and vomiting.   Endocrine: Negative for polydipsia, polyphagia and polyuria.   Genitourinary: Negative for difficulty urinating, dysuria, frequency and urgency.        Negative yeast infection   Musculoskeletal: Positive for gait problem (r/t left sided weakness, ambulates with quad cane). Negative for myalgias and neck pain.   Integumentary:  Negative for rash and wound.   Allergic/Immunologic: Negative.    Neurological: Positive for weakness (residual left sided secondary to CVA). Negative for dizziness, syncope, numbness and headaches.   Hematological: Negative.    Psychiatric/Behavioral: Negative for confusion  and sleep disturbance.   All other systems reviewed and are negative.     Objective:       Physical Exam  Constitutional:       General: Awake.      Appearance: Normal appearance. Well-developed and well-groomed.   HENT:      Head: Normocephalic and atraumatic.   Eyes:      General: Lids are normal. Gaze aligned appropriately.   Pulmonary:      Effort: Pulmonary effort is normal. No respiratory distress.   Neurological:      Mental Status: Alert and oriented to person, place, and time.   Psychiatric:         Attention and Perception: Attention normal.         Mood and Affect: Mood and affect normal.         Speech: Speech normal.         Behavior: Behavior normal.         Thought Content: Thought content normal.         Cognition and Memory: Cognition normal.         Judgment: Judgment normal.     Assessment / Plan:     Diagnoses and all orders for this visit:    Uncontrolled type 2 diabetes mellitus with hyperglycemia, with long-term current use of insulin  -     Hemoglobin A1C; Future    Hypertension, unspecified type    Hyperlipidemia, unspecified hyperlipidemia type    Additional Plan Details:  - Condition: Sub optimal control, most recent A1C 7.6 % was completed 5 weeks ago.   - Monitor blood glucose:  4x daily.Goals reviewed. Maintain BG log. Bring to next visit for review.  - Hypoglycemia protocol: Reviewed and risk discussed.  Notify if BG readings below 80. Protocol printout provided.   - Diet: Limit carbohydrate intake 30-45 grams/meal, 3x daily and 15 grams/snack, limit 2/day.    - Activity: Recommend at least 150 minutes of exercise in a week.  - BP and LDL: Recommend lifestyle modifications and follow up with PCP for management.   - Medication Regimen:     Continue Trulicity 4.5 mg weekly  Continue Glipizide 10 tablet, 1 tablet before breakfast and 1 tablet before dinner, may do half tablet if eating lighter meals  Continue Lantus 72 units in the morning. Discussed consistency.  Continue Metformin 500  mg, 2 tablets twice daily with meals  Continue Actos 30 mg daily    - Medication consideration: Continue regimen. Discussed consistency of Lantus.  - Lab results: A1C discussed.  - Health Maintenance standards addressed today:  A1c scheduled.   - Risks of uncontrolled DM explained. Importance of routine foot and eye exams reviewed. Scheduled as appropriate.  -The patient was explained the above plan and given opportunity to ask questions.  She understands, chooses and consents to this plan and accepts all the risks, which include but are not limited to the risks mentioned above.   - Labs ordered as above.  - CDE referral: Scheduled  - Follow up: 2 months in clinic, [place].      Zenaida Shetty, FNP-C [facility]    A total of 15 minutes was spent in face to face time, of which over 50 % was spent in counseling patient on disease process, complications, treatment, and side effects of medications.

## 2024-01-16 NOTE — PATIENT INSTRUCTIONS
Medication Regimen:   Continue Trulicity 4.5 mg weekly  Continue Glipizide 10 tablet, 1 tablet before breakfast and 1 tablet before dinner, may do half tablet if eating lighter meals  Continue Lantus 72 units in the morning  Continue Metformin 500 mg, 2 tablets twice daily with meals  Continue Actos 30 mg daily    Patient Instructions:  Carbohydrate Count: 30-45 grams/meal and 15 grams/snack with limit of 2 snacks per day.  Exercise: Goal is 150 minutes or more per week.  Bring meter and blood sugar log to each appointment.     Goals for Blood Sugar:  Fastin-130 mg/dl  2 hours after meal:  mg/dl  Before Bed: 100-150 mg/dl  If Blood sugar is below 70 mg/dl, DO NOT take diabetes medication. Eat first and then recheck blood sugar in 20 minutes.  Foods to eat if blood sugar is below 70 mg/dl  1/2 glass of orange juice   1/2 regular cola can   3-4 hard candies   3-4 small glucose tabs.   Foods to eat if blood sugar is below 50 mg/dl  1 glass of orange juice  1 regular cola can  8 hard candies  8 small glucose tabs.   If you have repeated eating/blood sugar recheck process 2 times and blood sugar still below 70 mg/dl, call 911.

## 2024-01-17 ENCOUNTER — TELEPHONE (OUTPATIENT)
Dept: INTERNAL MEDICINE | Facility: CLINIC | Age: 69
End: 2024-01-17
Payer: MEDICARE

## 2024-01-17 NOTE — TELEPHONE ENCOUNTER
----- Message from Toyin Flowers sent at 1/16/2024  3:56 PM CST -----  Type:  Needs Medical Advice    Who Called: pt daughter    Would the patient rather a call back or a response via [facility]? call  Best Call Back Number: [phone]  Additional Information: pt is following up on some paper work that needs to get completed for the patient to get release  from Dr sanam ashby  and sent back to Hospice

## 2024-01-30 ENCOUNTER — EXTERNAL HOME HEALTH (OUTPATIENT)
Dept: HOME HEALTH SERVICES | Facility: HOSPITAL | Age: 69
End: 2024-01-30
Payer: MEDICARE

## 2024-02-02 ENCOUNTER — DOCUMENT SCAN (OUTPATIENT)
Dept: HOME HEALTH SERVICES | Facility: HOSPITAL | Age: 69
End: 2024-02-02
Payer: MEDICARE

## 2024-02-22 ENCOUNTER — DOCUMENT SCAN (OUTPATIENT)
Dept: HOME HEALTH SERVICES | Facility: HOSPITAL | Age: 69
End: 2024-02-22
Payer: MEDICARE

## 2024-03-08 ENCOUNTER — PATIENT OUTREACH (OUTPATIENT)
Dept: ADMINISTRATIVE | Facility: HOSPITAL | Age: 69
End: 2024-03-08
Payer: MEDICARE

## 2024-03-08 DIAGNOSIS — E11.65 UNCONTROLLED TYPE 2 DIABETES MELLITUS WITH HYPERGLYCEMIA, WITH LONG-TERM CURRENT USE OF INSULIN: Primary | ICD-10-CM

## 2024-03-08 DIAGNOSIS — E11.51 TYPE II DIABETES MELLITUS WITH PERIPHERAL CIRCULATORY DISORDER: ICD-10-CM

## 2024-03-08 DIAGNOSIS — Z79.4 UNCONTROLLED TYPE 2 DIABETES MELLITUS WITH HYPERGLYCEMIA, WITH LONG-TERM CURRENT USE OF INSULIN: Primary | ICD-10-CM

## 2024-03-08 NOTE — PROGRESS NOTES
Working Diabetes Lab.    Pt has lab appt 3/14/24.  Micro albumin urine ordered and linked to appt.

## 2024-03-12 NOTE — PROGRESS NOTES
Pt has been followed for over 90 days after ER visit. Pt should be called if she comes back into ER and falls on the Medicare 2+ Chronic Condition patient measure report. Pt will be called by ED navigator whom is working this location.    Ana María Main  ED Navigator  [phone]

## 2024-03-15 ENCOUNTER — EXTERNAL HOME HEALTH (OUTPATIENT)
Dept: HOME HEALTH SERVICES | Facility: HOSPITAL | Age: 69
End: 2024-03-15
Payer: MEDICARE

## 2024-03-18 ENCOUNTER — EXTERNAL HOME HEALTH (OUTPATIENT)
Dept: HOME HEALTH SERVICES | Facility: HOSPITAL | Age: 69
End: 2024-03-18
Payer: MEDICARE

## 2024-03-20 ENCOUNTER — TELEPHONE (OUTPATIENT)
Dept: DIABETES | Facility: CLINIC | Age: 69
End: 2024-03-20
Payer: MEDICARE

## 2024-03-20 NOTE — TELEPHONE ENCOUNTER
Spoke with patient daughter to see if they was still able to be present at today's appointment to follow up with diabetes management provider. Patient daughter informed me that patient is on hospice and the appointment wasn't needed because they pretty much take care of everything for the patient. Patient daughter informed I will let provider know and I will cancel appointment. Patient daughter voiced understanding.

## 2024-05-14 ENCOUNTER — OFFICE VISIT (OUTPATIENT)
Dept: NEUROLOGY | Facility: CLINIC | Age: 69
End: 2024-05-14
Payer: MEDICARE

## 2024-05-14 DIAGNOSIS — Z91.89 AT RISK FOR PROLONGED QT INTERVAL SYNDROME: ICD-10-CM

## 2024-05-14 DIAGNOSIS — F41.9 ANXIETY: ICD-10-CM

## 2024-05-14 DIAGNOSIS — I69.354 HEMIPARESIS AFFECTING LEFT SIDE AS LATE EFFECT OF CEREBROVASCULAR ACCIDENT: ICD-10-CM

## 2024-05-14 DIAGNOSIS — I10 HYPERTENSION GOAL BP (BLOOD PRESSURE) < 130/80: ICD-10-CM

## 2024-05-14 DIAGNOSIS — F02.811 MAJOR NEUROCOGNITIVE DISORDER DUE TO MULTIPLE ETIOLOGIES, WITH AGITATION: Primary | ICD-10-CM

## 2024-05-14 DIAGNOSIS — G47.00 INSOMNIA, UNSPECIFIED TYPE: ICD-10-CM

## 2024-05-14 DIAGNOSIS — F32.9 MAJOR DEPRESSIVE DISORDER, REMISSION STATUS UNSPECIFIED, UNSPECIFIED WHETHER RECURRENT: ICD-10-CM

## 2024-05-14 DIAGNOSIS — E03.9 HYPOTHYROIDISM (ACQUIRED): ICD-10-CM

## 2024-05-14 DIAGNOSIS — F03.90 DEMENTIA WITHOUT BEHAVIORAL DISTURBANCE: ICD-10-CM

## 2024-05-14 DIAGNOSIS — Z86.73 HISTORY OF STROKE: ICD-10-CM

## 2024-05-14 DIAGNOSIS — F33.1 DEPRESSION, MAJOR, RECURRENT, MODERATE: ICD-10-CM

## 2024-05-14 DIAGNOSIS — F32.5 MAJOR DEPRESSION IN REMISSION: ICD-10-CM

## 2024-05-14 PROCEDURE — 1160F RVW MEDS BY RX/DR IN RCRD: CPT | Mod: CPTII,95,, | Performed by: NURSE PRACTITIONER

## 2024-05-14 PROCEDURE — 3288F FALL RISK ASSESSMENT DOCD: CPT | Mod: CPTII,95,, | Performed by: NURSE PRACTITIONER

## 2024-05-14 PROCEDURE — 4010F ACE/ARB THERAPY RXD/TAKEN: CPT | Mod: CPTII,95,, | Performed by: NURSE PRACTITIONER

## 2024-05-14 PROCEDURE — 1101F PT FALLS ASSESS-DOCD LE1/YR: CPT | Mod: CPTII,95,, | Performed by: NURSE PRACTITIONER

## 2024-05-14 PROCEDURE — 99214 OFFICE O/P EST MOD 30 MIN: CPT | Mod: 95,,, | Performed by: NURSE PRACTITIONER

## 2024-05-14 PROCEDURE — 1159F MED LIST DOCD IN RCRD: CPT | Mod: CPTII,95,, | Performed by: NURSE PRACTITIONER

## 2024-05-14 PROCEDURE — 3072F LOW RISK FOR RETINOPATHY: CPT | Mod: CPTII,95,, | Performed by: NURSE PRACTITIONER

## 2024-05-14 RX ORDER — ZOLPIDEM TARTRATE 5 MG/1
5 TABLET ORAL NIGHTLY
Qty: 90 TABLET | Refills: 1 | Status: SHIPPED | OUTPATIENT
Start: 2024-05-14 | End: 2024-11-10

## 2024-05-14 NOTE — PROGRESS NOTES
Subjective:       Patient ID: Krysta Manuel is a 68 y.o. female.    Chief Complaint: Major neurocognitive disorder due to multiple etiologies, w      Referred by PCP: Dr. Wanda Cook, DO    HPI  The patient is new to me, she is here for memory loss and behavior changes. The patient is accompanied by daughter/caretaker. The patient is presenting memory changes and behavior changes  that began in 10/2022, per daughter it has worsened since then. The main problems the patient has are related to progressive short term memory loss and changes in behavior. For example, the patient would repeat the same question over and over again. The patient forget conversations had with family.  The patient is not forgetting where placed certain things, daughter states she don't manage anything since having the stroke, her daughter provides her with assistance and care. Patient is very inactive. She talks to herself, easily upset. The patient do not drive, and never learned to drive. No confusion around and inside the house. No trouble remembering the date and time. Patient daughter is managing patients medications and appointments. Patient is aware of major holidays and political changes. The patient is daughter is handling finances. The patient requires assistance with ADLs. Daughter prepare meals, daughter provide assistance with baths. Patient able to feed her self.  No hallucinations or delusions. No seizures. Agitation behavioral problems. No problems handling tools. 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 Ambulate short distances with cane and uses wheelchair. Stroke Risk factors Risck factors HTN, HLD, DM, No history of headaches. Hx of  hypothyroidism. Chronic DM type II. PA fib takes Cardizem.  No history of alcoholism. No history of B12 deficiency. Chronic anxiety and depression Zoloft. Referral to physiatrists was missed No history of STDs.  No  history of HIV infection. No toxic exposures. Left arm Hemiparesis, Stroke side  arm has abnormal movements at times per daughter. Left arm flaccid, gait instability. Urgency nocturia urinary incontinence. Takes ambiens for sleep partially helpful. Patient has decreased appetite. Multiple Siblings have dementia living ages (79, 75, onset). She was started on Namenda 10 mg BID by PCP on ,  tolerating well. Today MOCA 05- 25/30.  08-: Patient present for follow up for memory with behavioral management. Patient accompanied by daughter. Patient is tolerating Namenda 10 mg BID no side effects. Tolerating Donepezil/Aricept 5 mg QHS no side effects. Daughter reports the  LTG 25 mg po BID is well tolerated but no changes noted in agitation and mood stabilization.Discussed plan of care.     Patient was previously managed by Dr. Pierre. He was prescribed patient Ambien 5 mg po HS for sleep( formerly managed by Dr. Pierre, who is no longer provider) Patient daughter request refill for Ambien, patient failed Trazodone but does well on Ambien 5 mg po no side effects. She is pending follow up for physiatry.     INTERVAL NOTE 05- Patient present for follow up for memory with behavioral management. Patient accompanied by daughter. Patient is tolerating Namenda 10 mg BID no side effects. No longer taking Donepezil/Aricept 10 mg QHS. Patient Ambien 5 mg po HS for sleep. Refills requested.Daughter reports the   mg daily is well tolerated no agitation. Patient has had a decline in care, she has had multiple falls within the last months, and a decline and ADL care. Patient will have a transition to  hospice per daughter. Discussed plan of care.       Review of Systems   Unable to perform ROS: Dementia   Constitutional:  Positive for activity change.   HENT:          Edentulous    Eyes: Negative.    Respiratory: Negative.     Cardiovascular: Negative.    Gastrointestinal: Negative.    Endocrine: Negative.     Genitourinary:  Positive for urgency.   Musculoskeletal:  Positive for arthralgias, back pain, gait problem and myalgias.   Allergic/Immunologic: Negative.    Neurological:  Positive for facial asymmetry, speech difficulty, weakness and numbness.   Hematological: Negative.    Psychiatric/Behavioral:  Positive for agitation, behavioral problems, confusion, decreased concentration, dysphoric mood and sleep disturbance.                  Current Outpatient Medications:     aspirin (ECOTRIN) 81 MG EC tablet, Take 1 tablet (81 mg total) by mouth once daily., Disp: 30 tablet, Rfl: 11    blood sugar diagnostic (TRUE METRIX GLUCOSE TEST STRIP) Strp, USE 3 TIMES DAILY, Disp: 100 each, Rfl: 11    carvediloL (COREG) 25 MG tablet, Take 25 mg by mouth 2 (two) times daily with meals., Disp: , Rfl:     clopidogreL (PLAVIX) 75 mg tablet, Take 75 mg by mouth once daily., Disp: , Rfl:     diltiaZEM (DILACOR XR) 240 MG CDCR, Take 240 mg by mouth once daily., Disp: , Rfl:     dulaglutide (TRULICITY) 4.5 mg/0.5 mL pen injector, INJECT 4.5 MG INTO THE SKIN EVERY 7 DAYS., Disp: 4 pen , Rfl: 5    EASY TOUCH LANCING DEVICE Misc, , Disp: , Rfl:     fluticasone propionate (FLONASE) 50 mcg/actuation nasal spray, 2 SPRAYS IN EACH NOSTRIL EVERY DAY AS NEEDED, Disp: 16 g, Rfl: 1    gabapentin (NEURONTIN) 100 MG capsule, Take 100 mg by mouth., Disp: , Rfl:     glipiZIDE (GLUCOTROL) 10 MG tablet, TAKE ONE TABLET BY MOUTH TWO TIMES A DAY BEFORE MEALS, Disp: 60 tablet, Rfl: 5    hydrALAZINE (APRESOLINE) 25 MG tablet, Take 1 tablet (25 mg total) by mouth every 12 (twelve) hours., Disp: 60 tablet, Rfl: 1    hydroCHLOROthiazide (HYDRODIURIL) 25 MG tablet, TAKE 1 TABLET (25 MG TOTAL) BY MOUTH ONCE DAILY., Disp: 90 tablet, Rfl: 3    HYDROcodone-acetaminophen (NORCO)  mg per tablet, , Disp: , Rfl: 0    irbesartan (AVAPRO) 300 MG tablet, TAKE 1 TABLET (300 MG TOTAL) BY MOUTH EVERY EVENING., Disp: 90 tablet, Rfl: 3    lamoTRIgine (LAMICTAL) 150 MG  "Tab, Take 1 tablet (150 mg total) by mouth once daily., Disp: 30 tablet, Rfl: 5    LANTUS SOLOSTAR U-100 INSULIN glargine 100 units/mL SubQ pen, Inject 72 Units into the skin once daily., Disp: 30 mL, Rfl: 5    levothyroxine (SYNTHROID) 125 MCG tablet, TAKE 1 TABLET (125 MCG TOTAL) BY MOUTH BEFORE BREAKFAST., Disp: 90 tablet, Rfl: 2    linaCLOtide (LINZESS) 290 mcg Cap capsule, Take 1 capsule (290 mcg total) by mouth once daily., Disp: 30 capsule, Rfl: 11    memantine (NAMENDA) 10 MG Tab, TAKE 1 TABLET BY MOUTH 2 TIMES DAILY., Disp: 180 tablet, Rfl: 1    metFORMIN (GLUCOPHAGE-XR) 500 MG ER 24hr tablet, TAKE 2 TABLETS BY MOUTH 2 TIMES DAILY WITH MEALS., Disp: 360 tablet, Rfl: 1    oseltamivir (TAMIFLU) 75 MG capsule, Take 75 mg by mouth 2 (two) times daily., Disp: , Rfl:     pen needle, diabetic (SURE COMFORT PEN NEEDLE) 32 gauge x 5/32" Ndle, Use daily with insulin pen., Disp: 100 each, Rfl: 3    polyethylene glycol (GLYCOLAX) 17 gram/dose powder, Take 17 g by mouth once daily., Disp: 510 g, Rfl: 11    rosuvastatin (CRESTOR) 10 MG tablet, TAKE 1 TABLET (10 MG TOTAL) BY MOUTH NIGHTLY., Disp: 90 tablet, Rfl: 3    lancing device with lancets Kit, 1 each by Misc.(Non-Drug; Combo Route) route 3 (three) times daily., Disp: 200 each, Rfl: 10    pioglitazone (ACTOS) 30 MG tablet, Take 1 tablet (30 mg total) by mouth once daily., Disp: 90 tablet, Rfl: 1    zolpidem (AMBIEN) 5 MG Tab, Take 1 tablet (5 mg total) by mouth every evening., Disp: 90 tablet, Rfl: 1  Past Medical History:   Diagnosis Date    Arthritis     Carotid artery disease     Dr. Jessa Szymanski--cardiology    Depression     DM II (diabetes mellitus, type II), controlled 12 years    Heart disease     Dr. Jessa Szymanski--cardiology    HTN (hypertension)     Hyperlipidemia     Hypothyroidism     Insomnia     Stroke 2010    Dr. Jessa Szymanski--cardiology    Vitamin D deficiency      Past Surgical History:   Procedure Laterality Date    CHOLECYSTECTOMY      " COLONOSCOPY      COLONOSCOPY N/A 08/15/2023    Procedure: COLONOSCOPY;  Surgeon: Fina Vieyra MD;  Location: [facility];  Service: Endoscopy;  Laterality: N/A;    HYSTERECTOMY      THYROIDECTOMY      TOTAL ABDOMINAL HYSTERECTOMY W/ BILATERAL SALPINGOOPHORECTOMY       Social History     Socioeconomic History    Marital status:    Tobacco Use    Smoking status: Never    Smokeless tobacco: Never   Substance and Sexual Activity    Alcohol use: Never    Drug use: Never    Sexual activity: Not Currently     Partners: Male     Comment: That was her , she passed away. She dont want tell me     Social Determinants of Health     Financial Resource Strain: Medium Risk (9/7/2023)    Overall Financial Resource Strain (CARDIA)     Difficulty of Paying Living Expenses: Somewhat hard   Food Insecurity: Food Insecurity Present (9/7/2023)    Hunger Vital Sign     Worried About Running Out of Food in the Last Year: Sometimes true     Ran Out of Food in the Last Year: Sometimes true   Transportation Needs: No Transportation Needs (9/7/2023)    PRAPARE - Transportation     Lack of Transportation (Medical): No     Lack of Transportation (Non-Medical): No   Physical Activity: Inactive (9/7/2023)    Exercise Vital Sign     Days of Exercise per Week: 0 days     Minutes of Exercise per Session: 0 min   Stress: Stress Concern Present (9/7/2023)    [employer] - Occupational Stress Questionnaire     Feeling of Stress : Very much   Housing Stability: Low Risk  (9/7/2023)    Housing Stability Vital Sign     Unable to Pay for Housing in the Last Year: No     Number of Places Lived in the Last Year: 2     Unstable Housing in the Last Year: No             Past/Current Medical/Surgical History, Past/Current Social History, Past/Current Family History and Past/Current Medications were reviewed in detail.        Objective:           VITAL SIGNS WERE REVIEWED      GENERAL APPEARANCE:     The patient looks  comfortable, upset, cooperative.    BMI 35.76 KG     No signs of respiratory distress.    Normal breathing pattern.    No dysmorphic features    Normal eye contact.     GENERAL MEDICAL EXAM:    HEENT:  Head is atraumatic normocephalic.     No tender temporal arteries. Fundoscopic (Ophthalmoscopic) exam showed no disc edema.      Neck and Axillae: No JVD. No visible lesions.    No carotid bruits. No thyromegaly. No lymphadenopathy.    Cardiopulmonary: No cyanosis. No tachypnea. Normal respiratory effort.    Clear breath sounds. S1, S2 with regular rhythm . No murmurs.     Gastrointestinal/Urogenital:  No jaundice. No stomas or lesions. No visible hernias. No catheters.     Abdomen is soft non-tender. No masses or organomegaly.    Skin, Hair and Nails: No pathognonomic skin rash. No neurofibromatosis. No visible lesions.No stigmata of autoimmune disease. No clubbing.    Skin is warm and moist. No palpable masses.    Limbs: No varicose veins. No visible swelling.    No palpable edema. Pulses are symmetric. Pedal pulses are palpable.      Muskoskeletal: No visible deformities.No visible lesions.    No spine tenderness. No signs of longstanding neuropathy. No dislocations or fractures.            Neurologic Exam     Mental Status   Oriented to person, place, and time.   Follows 2 step commands.   Attention: decreased. Concentration: decreased.   Speech: slurred   Level of consciousness: alert  Knowledge: poor and inconsistent with education. Able to perform simple calculations.   Able to name object. Able to read. Able to repeat. Able to write. Abnormal comprehension.     Cranial Nerves     CN II   Visual fields full to confrontation.   Visual acuity: normal  Right visual field deficit: none  Left visual field deficit: none     CN III, IV, VI   Pupils are equal, round, and reactive to light.  Extraocular motions are normal.   Right pupil: Size: 2 mm. Shape: regular. Reactivity: brisk. Consensual response: intact.  Accommodation: intact.   Left pupil: Size: 2 mm. Shape: regular. Reactivity: brisk. Consensual response: intact. Accommodation: intact.   CN III: no CN III palsy  CN VI: no CN VI palsy  Nystagmus: none   Diplopia: none  Ophthalmoparesis: none  Upgaze: normal  Downgaze: normal  Conjugate gaze: present  Vestibulo-ocular reflex: present    CN V   Facial sensation intact.   Right facial sensation deficit: none  Left facial sensation deficit: complete  Left corneal reflex: abnormal    CN VII   Right facial weakness: none  Left facial weakness: peripheral    CN VIII   CN VIII normal.     CN IX, X   CN IX normal.   CN X normal.   Palate: asymmetric    CN XI   CN XI normal.   Right sternocleidomastoid strength: normal  Right trapezius strength: normal    CN XII   CN XII normal.   Tongue: not atrophic  Fasciculations: absent  Tongue deviation: left    Motor Exam   Right arm tone: normal  Left arm tone: decreased  Right arm pronator drift: absent  Left arm pronator drift: present  Right leg tone: normal  Left leg tone: decreased    Strength   Strength 5/5 throughout.   Right neck flexion: 5/5  Left neck flexion: 0/5  Right neck extension: 5/5  Left neck extension: 0/5  Right deltoid: 5/5  Left deltoid: 0/5  Right biceps: 5/5  Left biceps: 0/5  Right triceps: 5/5  Left triceps: 0/5  Right wrist flexion: 5/5  Left wrist flexion: 0/5  Right wrist extension: 5/5  Left wrist extension: 0/5  Right interossei: 5/5  Left interossei: 0/5  Right iliopsoas: 5/5  Left iliopsoas: 4/5  Right quadriceps: 5/5  Left quadriceps: 4/5  Right hamstrin/5  Left hamstrin/5  Right glutei: 5/5  Left glutei: 4/5  Right anterior tibial: 5/5  Left anterior tibial: 4/5  Right posterior tibial: 5/5  Left posterior tibial: 4/5  Right peroneal: 5/5  Left peroneal: 4/5  Right gastroc: 5/5  Left gastroc: 4/5    Sensory Exam   Light touch normal.   Right arm light touch: normal  Left arm light touch: normal  Right leg light touch: normal  Left leg light  touch: normal  Vibration normal.   Right arm vibration: normal  Left arm vibration: normal  Right leg vibration: normal  Left leg vibration: normal  Proprioception normal.   Right arm proprioception: normal  Left arm proprioception: normal  Right leg proprioception: normal  Left leg proprioception: normal  Pinprick normal.   Right arm pinprick: normal  Left arm pinprick: normal  Right leg pinprick: normal  Left leg pinprick: normal  Sensory deficit distribution on left: lateral cutaneous thigh  Graphesthesia: normal  Stereognosis: normal    Gait, Coordination, and Reflexes     Gait  Gait: normal    Coordination   Romberg: negative  Finger to nose coordination: normal  Heel to shin coordination: normal  Tandem walking coordination: normal    Tremor   Resting tremor: absent  Intention tremor: absent  Action tremor: left arm and right arm    Reflexes   Right brachioradialis: 2+  Left brachioradialis: 2+  Right biceps: 2+  Left biceps: 2+  Right triceps: 2+  Left triceps: 2+  Right patellar: 2+  Left patellar: 2+  Right achilles: 2+  Left achilles: 2+  Right : 2+  Left : 2+  Right plantar: normal  Left plantar: normal  Right Ortez: absent  Left Ortez: absent  Right ankle clonus: absent  Left ankle clonus: absent  Right pendular knee jerk: absent  Left pendular knee jerk: absent        SHIN COGNITIVE ASSESSMENT (MOCA) TOTAL SCORE 30         NORMAL-MILD NCD 26-30    MILD DEMENTIA 20-25    Recall recovered with 2 cue    +1 for education 9 th grade completion     DATE 05-       TOTAL SCORE 25       VISUOSPATIAL EXECUTIVE (5) 3       NAMING (3) 3       ATTENTION (6) 5       LANGUAGE (3) 3       ABSTRACTION(2) 1       RECALL (5) 2       ORIENTATION (6) 6             Lab Results   Component Value Date    WBC 9.38 09/06/2023    HGB 11.7 (L) 09/06/2023    HCT 37.3 09/06/2023    MCV 87 09/06/2023     09/06/2023     Sodium   Date Value Ref Range Status   09/14/2023 141 136 - 145 mmol/L Final      Potassium   Date Value Ref Range Status   09/14/2023 4.6 3.5 - 5.1 mmol/L Final     Chloride   Date Value Ref Range Status   09/14/2023 106 95 - 110 mmol/L Final     CO2   Date Value Ref Range Status   09/14/2023 24 23 - 29 mmol/L Final     Glucose   Date Value Ref Range Status   09/14/2023 108 70 - 110 mg/dL Final     BUN   Date Value Ref Range Status   09/14/2023 14 8 - 23 mg/dL Final     Creatinine   Date Value Ref Range Status   09/14/2023 1.1 0.5 - 1.4 mg/dL Final     Calcium   Date Value Ref Range Status   09/14/2023 10.9 (H) 8.7 - 10.5 mg/dL Final     Total Protein   Date Value Ref Range Status   09/06/2023 7.8 6.0 - 8.4 g/dL Final     Albumin   Date Value Ref Range Status   09/06/2023 4.2 3.5 - 5.2 g/dL Final     Total Bilirubin   Date Value Ref Range Status   09/06/2023 0.5 0.1 - 1.0 mg/dL Final     Comment:     For infants and newborns, interpretation of results should be based  on gestational age, weight and in agreement with clinical  observations.    Premature Infant recommended reference ranges:  Up to 24 hours.............<8.0 mg/dL  Up to 48 hours............<12.0 mg/dL  3-5 days..................<15.0 mg/dL  6-29 days.................<15.0 mg/dL       Alkaline Phosphatase   Date Value Ref Range Status   09/06/2023 68 55 - 135 U/L Final     AST   Date Value Ref Range Status   09/06/2023 14 10 - 40 U/L Final     ALT   Date Value Ref Range Status   09/06/2023 16 10 - 44 U/L Final     Anion Gap   Date Value Ref Range Status   09/14/2023 11 8 - 16 mmol/L Final     eGFR if    Date Value Ref Range Status   07/18/2022 >60.0 >60 mL/min/1.73 m^2 Final     eGFR if non    Date Value Ref Range Status   07/18/2022 58.0 (A) >60 mL/min/1.73 m^2 Final     Comment:     Calculation used to obtain the estimated glomerular filtration  rate (eGFR) is the CKD-EPI equation.        Lab Results   Component Value Date    [id number] 416 05/15/2023     Lab Results   Component Value Date     TSH 1.406 09/06/2023    FREET4 0.93 09/06/2023   Labs Reviewed:    05-    FA >40.0^, HIV neg, Vitamin B 12 416 NL, TSH 0.637 NL, HC 8.5 NL,       EKG Results:    05-    QT interval 396 NL, HR 81     MRI Brain WO (Care Everywhere)     01-    Chronic lacunar type infarction of the right basal ganglia, with gliosis extending into the thalamus and centrum semiovale. The ventricles are normal in size. No extra-axial fluid collections. Expected arterial flow voids are demonstrated. Visualized sinuses and mastoids are clear. Bone marrow signal is within normal limits.    MRI ANGIOGRAM HEAD NECK WO (Care Everywhere)      01-  There is no large vessel occlusion or severe stenosis.   Distal vessel wall irregularity is in part due to motion artifact, but could also represent underlying small vessel vasculopathy.    Normal MRA of the neck    CT Head WO (Care Everywhere)     01-    No CT evidence of an acute intracranial process  Old infarction right basal ganglia and corona radiata.    EEG Results:    05-    The EEG is normal in the awake and sleep states.       MRI Brain WO:    05-    No acute findings.     Mild atrophy.     Large chronic right basal ganglia/periventricular white matter infarct with associated white matter degeneration.     Chronic right maxillary sinus mucosal thickening.  Assessment:       1. Major neurocognitive disorder due to multiple etiologies, with agitation    2. Depression, major, recurrent, moderate    3. Insomnia, unspecified type    4. At risk for prolonged QT interval syndrome    5. Dementia without behavioral disturbance    6. Major depression in remission    7. History of stroke    8. Major depressive disorder, remission status unspecified, unspecified whether recurrent    9. Hypothyroidism (acquired)    10. Hemiparesis affecting left side as late effect of cerebrovascular accident    11. Anxiety    12. Hypertension goal BP (blood pressure) <  130/80          TModified Arcade Score (m-RS)      0  The patient has no residual symptoms.    1  The patient has no significant disability; able to carry out all pre-stroke activities.    2  The patient has slight disability; unable to carry out all pre-stroke activities but able to look after self without daily help.    3  The patient has moderate disability; requiring some external help but able to walk without the assistance of another individual.    4  The patient has moderately severe disability; unable to walk or attend to bodily functions without assistance of another individual.    5  The patient has severe disability; bedridden, incontinent, requires continuous care.    6  The patient has  (during the hospital stay or after discharge from the hospital).    Plan:         MAJOR NEUROCOGNITIVE DISORDER DUE TO MULTIPLE ETIOLOGIES WITH AGITATION/ VASCULAR VS AD DEMENTIA/  HX OF RT BASAL GANGLIA AND ENRIQUEZ RADIATA STROKE WITH DEFICITS/ MDD/DOREEN/ HYPOTHYROIDISM/        EVALUATION     Comprehensive Neuropsychological Testing.    Explained to the patient and family that medications are intended to slow down the progression and not stop it or reverse the disease.The disease is relentless, progressive and so far cannot be controlled.     I counseled the patient and family that the rate of progression is extremely variable and the average (10 years) is an inaccurate measure.     Continue memantine/Namenda 10 mg BID    Continue Donepezil/Aricept 10 mg QHS        SYMPTOMATIC MANAGEMENT     Continue  mg Daily  to help for agitation and mood stabilization.    Continue Ambien 5 mg po HS ( formerly managed by Dr. Pierre, who is no longer provider)  Refills sent     Future consideration:     Risperdal 1 mg QHS to assist with psychotic symptoms and behavioral disturbances   excessive sedation or paradoxical agitation.      Failed Trazodone per patient daughter.     HOME CARE     Pallative care     Falling Down  Precautions. Gait is affected by the disease as well.     Avoid driving and access to firearms     Incremental 24/Care     Help with finances and decision making.    Join support group.    Proofing the house and use labeling.    Avoid antihistamines and anticholinergics.    Avoid changing routine.    Use written reminders.    Avoid multitasking.    Healthy diet, exercise (physical and cognitive).    Good sleep hygiene.    For behavioral problems I recommended that family to try some of the following communication tips: Try not to react and stay calm, Don't argue , Do not use logic, Let the patient feel safe, Keep reminding the patient and use photos if necessary, Distract the patient, Search for things to distract the person, then talk about what you found. For example, talk about a photograph or keepsake or even food, Use gentle touching or hugging to show you care, Body language matters, Use a cooling off period if needed, when possible. If safe to do so, give the patient some space or breathing room. Will consider antipsychotic medications as a last resort because of the risk of CAD and CVD.    Recommend reading the book: The 36-Hour Day and there are many online and printed resources to help caregivers as well.     For More Information About Hallucinations, Delusions, and Paranoia in Alzheimer's   [facility] Alzheimer's and related Dementias Education and Referral (ADEAR) Center   1-877.543.5389 (toll-free)   adear@[employer].nih.gov   www.[employer].nih.gov/alzheimers   The National [place] [facility]'s ADEAR Center offers information and free print publications about Alzheimer's disease and related dementias for families, caregivers, and health professionals. ADEAR Center staff answer telephone, email, and written requests and make referrals to local and national resources      PREVENTION OF DELIRIUM       1. Good hydration and avoid electrolyte imbalance  2. Recognize andtreat infections immediately especially UTI.  3. Bladder  emptying and prevent constipation.   4. Provide stimulating activities and familiar objects  5. Use eyeglasses and hearing aids if needed.   6. Use simple and regular communication about people, current place and time  7. Mobility and range-of-motion exercises  8. Reduce noise, lighting and avoid sleep interruptions  9. Non-narcotic pain management.  10.Nondrug treatment for sleep problems or anxiety  11. Avoid antihistamines.  12. Avoid narcotics.  13. Avoid benzodiazepines.       HX OF RT BRAIN BASAL GANGLIA AND ENRIQUEZ RADIATA STROKE WITH DEFICIT DYSARTHRIA, FACIAL DROOP LEFT SIDE, LEFT UPPER ARM PARAYRALTS LEFT  SIDE HEMIPARESIS    (2010 Right Brain Stroke with deficts left facial droop, Dysarthria, Left ARM HEMPARSIS, LEFT sided weakness/ Stroke Risk factors Risck factors HTN, HLD, DM)    Vascular Risk Factors (VRFs) stratification (BP, BS, BC control and Smoking Cessation) is the mainstay of stroke prevention (>90%). The benefit of antiplatelets is < 20% stroke risk reduction.       Wheelchair, Cane     Healthy diet and exercise    Avoid driving.    Call 911 if any SUDDEN:   Weakness  Numbness  Slurring of speech  Speech difficulty   Vertigo  Loss of balance  Loss of vision  Loss of hearing  Double vision  Trouble swallowing  Trouble breathing  Facial drooping        MEDICAL/SURGICAL COMORBIDITIES     All relevant medical comorbidities noted and managed by primary care physician and medical care team.          MISCELLANEOUS MEDICAL PROBLEMS       HEALTHY LIFESTYLE AND PREVENTATIVE CARE    Encouraged the patient to adhere to the age-appropriate health maintenance guidelines including screening tests and vaccinations.     Discussed the overall importance of healthy lifestyle, optimal weight, exercise, healthy diet, good sleep hygiene and avoiding drugs including smoking, alcohol and recreational drugs. The patient verbalized full understanding.       Advised the patient to follow COVID-19 prevention measures.        I spent 42 minutes  more than 50 % spent face to face with the patient  Time spent in counseling and coordination of care including discussions etiology of diagnosis, pathogenesis of diagnosis, prognosis of diagnosis,, diagnostic results, impression and recommendations, diagnostic studies, management, risks and benefits of treatment, instructions of disease self-management, treatment instructions, follow up requirements, patient and family counseling/involvement in care compliance with treatment regimen. All of the patient's questions were answered during this discussion.       Zahraa Laurent, MSN NP      Collaborating Provider: Calin Matthew MD, FAAN Neurologist/Epileptologist

## 2024-08-28 DIAGNOSIS — E11.9 TYPE 2 DIABETES MELLITUS WITHOUT COMPLICATION: ICD-10-CM

## 2024-09-10 ENCOUNTER — PATIENT MESSAGE (OUTPATIENT)
Dept: ADMINISTRATIVE | Facility: HOSPITAL | Age: 69
End: 2024-09-10
Payer: MEDICARE

## 2025-01-07 ENCOUNTER — PATIENT OUTREACH (OUTPATIENT)
Dept: ADMINISTRATIVE | Facility: HOSPITAL | Age: 70
End: 2025-01-07
Payer: MEDICARE

## 2025-01-07 NOTE — PROGRESS NOTES
[facility] Score: 7     Urine Screening  Eye Exam  Hemoglobin A1c  Lipid Panel  Mammogram  Foot Exam  Uncontrolled BP    Influenza Vaccine  Tetanus Vaccine  Shingles/Zoster Vaccine  RSV Vaccine            Pt last seen Dec 2023.  Pt admitted to [facility], then admitted to [facility], they report pt was discharged and that it was not a transfer to another company.  Spoke with daughter-Kamille, said pt changed hospice company, but could not remember the name. She requested a call back as she was in the store.